# Patient Record
Sex: FEMALE | Race: WHITE | Employment: UNEMPLOYED | URBAN - METROPOLITAN AREA
[De-identification: names, ages, dates, MRNs, and addresses within clinical notes are randomized per-mention and may not be internally consistent; named-entity substitution may affect disease eponyms.]

---

## 2016-07-20 LAB — PAP SMEAR, EXTERNAL: NEGATIVE

## 2016-12-22 LAB
ANTIBODY SCREEN, EXTERNAL: NEGATIVE
CHLAMYDIA, EXTERNAL: NEGATIVE
CYSTIC FIBROSIS, EXTERNAL: NEGATIVE
HBSAG, EXTERNAL: NEGATIVE
HCT, EXTERNAL: 34.7
HGB, EXTERNAL: 11.7
HIV, EXTERNAL: NEGATIVE
N. GONORRHEA, EXTERNAL: NEGATIVE
PLATELET CNT,   EXTERNAL: 251
RUBELLA, EXTERNAL: NORMAL
T. PALLIDUM, EXTERNAL: NEGATIVE
TYPE, ABO & RH, EXTERNAL: NORMAL
URINALYSIS, EXTERNAL: NEGATIVE

## 2017-01-03 ENCOUNTER — TELEPHONE (OUTPATIENT)
Dept: OBGYN CLINIC | Age: 32
End: 2017-01-03

## 2017-01-03 ENCOUNTER — HOSPITAL ENCOUNTER (OUTPATIENT)
Dept: PERINATAL CARE | Age: 32
Discharge: HOME | End: 2017-01-03
Attending: OBSTETRICS & GYNECOLOGY
Payer: COMMERCIAL

## 2017-01-03 DIAGNOSIS — N92.6 MISSED MENSES: ICD-10-CM

## 2017-01-03 DIAGNOSIS — Z32.01 POSITIVE PREGNANCY TEST: Primary | ICD-10-CM

## 2017-01-03 PROCEDURE — 76813 OB US NUCHAL MEAS 1 GEST: CPT | Performed by: OBSTETRICS & GYNECOLOGY

## 2017-01-03 PROCEDURE — 36416 COLLJ CAPILLARY BLOOD SPEC: CPT | Performed by: OBSTETRICS & GYNECOLOGY

## 2017-01-03 NOTE — TELEPHONE ENCOUNTER
Called [facility] to see if pt was seen today. They confirmed she was seen. Referral generated and faxed to them.

## 2017-01-06 PROBLEM — Z34.90 PREGNANCY: Status: ACTIVE | Noted: 2017-01-06

## 2017-01-10 ENCOUNTER — TELEPHONE (OUTPATIENT)
Dept: PERINATAL CARE | Age: 32
End: 2017-01-10

## 2017-01-26 ENCOUNTER — ROUTINE PRENATAL (OUTPATIENT)
Dept: OBGYN CLINIC | Age: 32
End: 2017-01-26

## 2017-01-26 VITALS
WEIGHT: 102 LBS | HEIGHT: 63 IN | HEART RATE: 87 BPM | BODY MASS INDEX: 18.07 KG/M2 | SYSTOLIC BLOOD PRESSURE: 95 MMHG | DIASTOLIC BLOOD PRESSURE: 52 MMHG

## 2017-01-26 DIAGNOSIS — Z87.448 HX OF HEMATURIA: ICD-10-CM

## 2017-01-26 DIAGNOSIS — Z34.02 NORMAL FIRST PREGNANCY CONFIRMED, SECOND TRIMESTER: Primary | ICD-10-CM

## 2017-01-26 LAB
AFP, MATERNAL, EXTERNAL: NEGATIVE
URINALYSIS, EXTERNAL: NEGATIVE

## 2017-01-26 NOTE — PROGRESS NOTES
C/o HA - occipital, has had prior to preg, usually if she pushes herself too hard; takes Tylenol occ, but doesn't want to take meds. Rec tylenol prn, avoid NSAIDs, can try Mg, heat/ice, try to identify/minimize triggers. MSAFP today. Rpt UA for hematuria on NOB labs. RTO 4wks with US.

## 2017-01-26 NOTE — PATIENT INSTRUCTIONS

## 2017-01-28 LAB
AFP ADJ MOM SERPL: 0.74
AFP INTERP SERPL-IMP: NORMAL
AFP INTERP SERPL-IMP: NORMAL
AFP SERPL-MCNC: 33.4 NG/ML
AGE AT DELIVERY: 30.8 YEARS
COMMENT, 018013: NORMAL
GA METHOD: NORMAL
GA: 16.6 WEEKS
IDDM PATIENT QL: NO
Lab: NORMAL
MULTIPLE PREGNANCY: NO
NEURAL TUBE DEFECT RISK FETUS: NORMAL %
RESULTS, 017004: NORMAL

## 2017-01-29 LAB
APPEARANCE UR: ABNORMAL
BACTERIA #/AREA URNS HPF: ABNORMAL /[HPF]
BACTERIA UR CULT: NO GROWTH
BILIRUB UR QL STRIP: NEGATIVE
CASTS URNS QL MICRO: ABNORMAL /LPF
COLOR UR: YELLOW
CRYSTALS URNS MICRO: ABNORMAL
EPI CELLS #/AREA URNS HPF: ABNORMAL /HPF
GLUCOSE UR QL: NEGATIVE
HGB UR QL STRIP: NEGATIVE
KETONES UR QL STRIP: NEGATIVE
LEUKOCYTE ESTERASE UR QL STRIP: ABNORMAL
MICRO URNS: ABNORMAL
NITRITE UR QL STRIP: NEGATIVE
PH UR STRIP: 7.5 [PH] (ref 5–7.5)
PROT UR QL STRIP: ABNORMAL
RBC #/AREA URNS HPF: ABNORMAL /HPF
SP GR UR: 1.02 (ref 1–1.03)
UNIDENT CRYS URNS QL MICRO: PRESENT
URINALYSIS REFLEX, 377202: ABNORMAL
UROBILINOGEN UR STRIP-MCNC: 1 MG/DL (ref 0.2–1)
WBC #/AREA URNS HPF: ABNORMAL /HPF

## 2017-02-23 ENCOUNTER — ROUTINE PRENATAL (OUTPATIENT)
Dept: OBGYN CLINIC | Age: 32
End: 2017-02-23

## 2017-02-23 VITALS
BODY MASS INDEX: 18.78 KG/M2 | HEIGHT: 63 IN | DIASTOLIC BLOOD PRESSURE: 48 MMHG | HEART RATE: 75 BPM | WEIGHT: 106 LBS | SYSTOLIC BLOOD PRESSURE: 83 MMHG

## 2017-02-23 DIAGNOSIS — Z78.9 VEGETARIAN: ICD-10-CM

## 2017-02-23 DIAGNOSIS — Z34.02 NORMAL FIRST PREGNANCY CONFIRMED, SECOND TRIMESTER: Primary | ICD-10-CM

## 2017-02-23 NOTE — PATIENT INSTRUCTIONS

## 2017-02-23 NOTE — PROGRESS NOTES
US today wnl: 20+3 @ 20+6. Ant placenta. Nl morph. Does not want to know gender. Some fatigue, no dizziness. MSAFP wnl; UA/C&S wnl. Vegetarian - takes B12, would like nutrition referral.  RTO 4wks with 1hr/CBC. FETAL SURVEY  A SINGLE VIABLE IUP AT 20W6D GA BY LMP. FETAL CARDIAC MOTION OBSERVED. FETAL ANATOMY WELL VISUALIZED AND APPEARS WITHIN NORMAL LIMITS. NO ABNORMALITIES IDENTIFIED ON TODAYS EXAM.  APPROPRIATE GROWTH MEASURED; SIZE = DATES. HANNAH, CERVIX AND PLACENTA APPEAR WITHIN NORMAL LIMITS. GENDER: XY, PATIENT DOES NOT KNOW.

## 2017-03-23 ENCOUNTER — ROUTINE PRENATAL (OUTPATIENT)
Dept: OBGYN CLINIC | Age: 32
End: 2017-03-23

## 2017-03-23 VITALS — DIASTOLIC BLOOD PRESSURE: 52 MMHG | WEIGHT: 110 LBS | SYSTOLIC BLOOD PRESSURE: 100 MMHG | BODY MASS INDEX: 19.49 KG/M2

## 2017-03-23 DIAGNOSIS — Z34.00 PRENATAL CARE, FIRST PREGNANCY, UNSPECIFIED TRIMESTER: Primary | ICD-10-CM

## 2017-03-23 LAB
GTT, 1 HR, GLUCOLA, EXTERNAL: 83
HCT, EXTERNAL: 30.2
HGB, EXTERNAL: 10.4
PLATELET CNT,   EXTERNAL: 229

## 2017-03-23 NOTE — MR AVS SNAPSHOT
Visit Information Date & Time Provider Department Dept. Phone Encounter #  
 3/23/2017 10:30  S Romain Solomon, Bruce Moreau [phone] [id number] Upcoming Health Maintenance Date Due  
 PAP AKA CERVICAL CYTOLOGY 7/20/2019 Allergies as of 3/23/2017  Review Complete On: 3/23/2017 By: Melissa Ac Severity Noted Reaction Type Reactions Amoxicillin  03/24/2014   Systemic Rash Current Immunizations  Never Reviewed Name Date Influenza Vaccine (Quad) PF 12/22/2016 Not reviewed this visit You Were Diagnosed With   
  
 Codes Comments Prenatal care, first pregnancy, unspecified trimester    -  Primary ICD-10-CM: Z34.00 ICD-9-CM: V22.0 Vitals BP Weight(growth percentile) LMP BMI OB Status Smoking Status 100/52 110 lb (49.9 kg) 10/08/2016 (Approximate) 19.49 kg/m2 Pregnant Never Smoker BMI and BSA Data Body Mass Index Body Surface Area  
 19.49 kg/m 2 1.49 m 2 Your Updated Medication List  
  
   
This list is accurate as of: 3/23/17 11:11 AM.  Always use your most recent med list.  
  
  
  
  
 prenatal multivit-ca-min-fe-fa Tab Take  by mouth. We Performed the Following CBC W/O DIFF [57738 CPT(R)]   
 GEST. DIABETES 1-HR SCREEN [80952 CPT(R)] Introducing [facility]! Dear Jose Alberto Salguero: Thank you for requesting a [employer] account. Our records indicate that you already have an active [employer] account. You can access your account anytime at [email or web address] Did you know that you can access your hospital and ER discharge instructions at any time in [employer]? You can also review all of your test results from your hospital stay or ER visit. Additional Information If you have questions, please visit the Frequently Asked Questions section of the [employer] website at [email or web address] Remember, [employer] is NOT to be used for urgent needs. For medical emergencies, dial 911. Now available from your iPhone and Android! Please provide this summary of care documentation to your next provider. Your primary care clinician is listed as Roselyn Steward. If you have any questions after today's visit, please call [phone].

## 2017-03-23 NOTE — PROGRESS NOTES
+FM.  No VB/LOF/reg ctx. No c/o. 1hr/CBC today. Enc classes. Nutrition referral placed, but has not been notified about appt. Will check. RTO 4wks (DARNELL JOHNOSN).

## 2017-03-24 LAB
ERYTHROCYTE [DISTWIDTH] IN BLOOD BY AUTOMATED COUNT: 13.7 % (ref 12.3–15.4)
GLUCOSE 1H P 50 G GLC PO SERPL-MCNC: 83 MG/DL (ref 65–129)
HCT VFR BLD AUTO: 30.2 % (ref 34–46.6)
HGB BLD-MCNC: 10.4 G/DL (ref 11.1–15.9)
MCH RBC QN AUTO: 29.9 PG (ref 26.6–33)
MCHC RBC AUTO-ENTMCNC: 34.4 G/DL (ref 31.5–35.7)
MCV RBC AUTO: 87 FL (ref 79–97)
PLATELET # BLD AUTO: 229 X10E3/UL (ref 150–379)
RBC # BLD AUTO: 3.48 X10E6/UL (ref 3.77–5.28)
WBC # BLD AUTO: 4.5 X10E3/UL (ref 3.4–10.8)

## 2017-04-18 ENCOUNTER — ROUTINE PRENATAL (OUTPATIENT)
Dept: OBGYN CLINIC | Age: 32
End: 2017-04-18

## 2017-04-18 VITALS
SYSTOLIC BLOOD PRESSURE: 96 MMHG | DIASTOLIC BLOOD PRESSURE: 50 MMHG | WEIGHT: 113 LBS | HEART RATE: 74 BPM | HEIGHT: 63 IN | BODY MASS INDEX: 20.02 KG/M2

## 2017-04-18 DIAGNOSIS — Z34.03 NORMAL FIRST PREGNANCY CONFIRMED, THIRD TRIMESTER: Primary | ICD-10-CM

## 2017-04-18 NOTE — PROGRESS NOTES
C/o back pain -> rec mat belt, handout given for exercises. Mild anemia -> incr Fe. Vegetarian -> nutrition referral printed. Signed up for classes. Del consent reviewed. RTO 2wks.

## 2017-04-18 NOTE — PATIENT INSTRUCTIONS
Weeks 26 to 30 of Your Pregnancy: Care Instructions  Your Care Instructions    You are now in your last trimester of pregnancy. Your baby is growing rapidly. And you'll probably feel your baby moving around more often. Your doctor may ask you to count your baby's kicks. Your back may ache as your body gets used to your baby's size and length. If you haven't already had the Tdap shot during this pregnancy, talk to your doctor about getting it. It will help protect your  against pertussis infection. During this time, it's important to take care of yourself and pay attention to what your body needs. If you feel sexual, explore ways to be close with your partner that match your comfort and desire. Use the tips provided in this care sheet to find ways to be sexual in your own way. Follow-up care is a key part of your treatment and safety. Be sure to make and go to all appointments, and call your doctor if you are having problems. It's also a good idea to know your test results and keep a list of the medicines you take. How can you care for yourself at home? Take it easy at work  · Take frequent breaks. If possible, stop working when you are tired, and rest during your lunch hour. · Take bathroom breaks every 2 hours. · Change positions often. If you sit for long periods, stand up and walk around. · When you stand for a long time, keep one foot on a low stool with your knee bent. After standing a lot, sit with your feet up. · Avoid fumes, chemicals, and tobacco smoke. Be sexual in your own way  · Having sex during pregnancy is okay, unless your doctor tells you not to. · You may be very interested in sex, or you may have no interest at all. · Your growing belly can make it hard to find a good position during intercourse. [place] and explore. · You may get cramps in your uterus when your partner touches your breasts.   · A back rub may relieve the backache or cramps that sometimes follow orgasm. Learn about  labor  · Watch for signs of  labor. You may be going into labor if:  ¨ You have menstrual-like cramps, with or without nausea. ¨ You have about 4 or more contractions in 20 minutes, or about 8 or more within 1 hour, even after you have had a glass of water and are resting. ¨ You have a low, dull backache that does not go away when you change your position. ¨ You have pain or pressure in your pelvis that comes and goes in a pattern. ¨ You have intestinal cramping or flu-like symptoms, with or without diarrhea. ¨ You notice an increase or change in your vaginal discharge. Discharge may be heavy, mucus-like, watery, or streaked with blood. ¨ Your water breaks. · If you think you have  labor:  ¨ Drink 2 or 3 glasses of water or juice. Not drinking enough fluids can cause contractions. ¨ Stop what you are doing, and empty your bladder. Then lie down on your left side for at least 1 hour. ¨ While lying on your side, find your breast bone. Put your fingers in the soft spot just below it. Move your fingers down toward your belly button to find the top of your uterus. Check to see if it is tight. ¨ Contractions can be weak or strong. Record your contractions for an hour. Time a contraction from the start of one contraction to the start of the next one. ¨ Single or several strong contractions without a pattern are called Marco-Velasquez contractions. They are practice contractions but not the start of labor. They often stop if you change what you are doing. ¨ Call your doctor if you have regular contractions. Where can you learn more? Go to http://marlen-elly.info/. Enter L829 in the search box to learn more about \"Weeks 26 to 30 of Your Pregnancy: Care Instructions. \"  Current as of: May 30, 2016  Content Version: 11.2  © 1705-4385 [employer].  Care instructions adapted under license by [employer] (which disclaims liability or warranty for this information). If you have questions about a medical condition or this instruction, always ask your healthcare professional. [place] [address] any warranty or liability for your use of this information.

## 2017-05-04 ENCOUNTER — ROUTINE PRENATAL (OUTPATIENT)
Dept: OBGYN CLINIC | Age: 32
End: 2017-05-04

## 2017-05-04 VITALS
BODY MASS INDEX: 20.38 KG/M2 | DIASTOLIC BLOOD PRESSURE: 52 MMHG | HEART RATE: 68 BPM | HEIGHT: 63 IN | WEIGHT: 115 LBS | SYSTOLIC BLOOD PRESSURE: 81 MMHG

## 2017-05-04 DIAGNOSIS — Z34.03 NORMAL FIRST PREGNANCY CONFIRMED, THIRD TRIMESTER: Primary | ICD-10-CM

## 2017-05-04 NOTE — PATIENT INSTRUCTIONS
Weeks 30 to 32 of Your Pregnancy: Care Instructions  Your Care Instructions    You have made it to the final months of your pregnancy. By now, your baby is really starting to look like a baby, with hair and plump skin. As you enter the final weeks of pregnancy, the reality of having a baby may start to set in. This is the time to settle on a name, get your household in order, set up a safe nursery, and find quality  if needed. Doing these things in advance will allow you to focus on caring for and enjoying your new baby. You may also want to have a tour of your hospital's labor and delivery unit to get a better idea of what to expect while you are in the hospital.  During these last months, it is very important to take good care of yourself and pay attention to what your body needs. If your doctor says it is okay for you to work, don't push yourself too hard. Use the tips provided in this care sheet to ease heartburn and care for varicose veins. If you haven't already had the Tdap shot during this pregnancy, talk to your doctor about getting it. It will help protect your  against pertussis infection. Follow-up care is a key part of your treatment and safety. Be sure to make and go to all appointments, and call your doctor if you are having problems. It's also a good idea to know your test results and keep a list of the medicines you take. How can you care for yourself at home? Pay attention to your baby's movements  · You should feel your baby move several times every day. · Your baby now turns less, and kicks and jabs more. · Your baby sleeps 20 to 45 minutes at a time and is more active at certain times of day. · If your doctor wants you to count your baby's kicks:  ¨ Empty your bladder, and lie on your side or relax in a comfortable chair. ¨ Write down your start time. ¨ Pay attention only to your baby's movements. Count any movement except hiccups.   ¨ After you have counted 10 movements, write down your stop time. ¨ Write down how many minutes it took for your baby to move 10 times. ¨ If an hour goes by and you have not recorded 10 movements, have something to eat or drink and then count for another hour. If you do not record 10 movements in either hour, call your doctor. Ease heartburn  · Eat small, frequent meals. · Do not eat chocolate, peppermint, or very spicy foods. Avoid drinks with caffeine, such as coffee, tea, and sodas. · Avoid bending over or lying down after meals. · Talk a short walk after you eat. · If heartburn is a problem at night, do not eat for 2 hours before bedtime. · Take antacids like Mylanta, Maalox, Rolaids, or Tums. Do not take antacids that have sodium bicarbonate. Care for varicose veins  · Varicose veins are blood vessels that stretch out with the extra blood during pregnancy. Your legs may ache or throb. Most varicose veins will go away after the birth. · Avoid standing for long periods of time. Sit with your legs crossed at the ankles, not the knees. · Sit with your feet propped up. · Avoid tight clothing or stockings. Wear support hose. · Exercise regularly. Try walking for at least 30 minutes a day. Where can you learn more? Go to http://marlen-elly.info/. Enter N068 in the search box to learn more about \"Weeks 30 to 32 of Your Pregnancy: Care Instructions. \"  Current as of: May 30, 2016  Content Version: 11.2  © 0382-0551 [employer]. Care instructions adapted under license by [employer] (which disclaims liability or warranty for this information). If you have questions about a medical condition or this instruction, always ask your healthcare professional. [place] [address] any warranty or liability for your use of this information.

## 2017-05-04 NOTE — PROGRESS NOTES
+FM.  Back pain better. Was using mat belt. Now some rib pain, tolerable. Nutrition consult 5/11 (placed b/c pt stated vegetarian, but does eat some fish). RTO 2wk.

## 2017-05-11 ENCOUNTER — HOSPITAL ENCOUNTER (OUTPATIENT)
Dept: NUTRITION | Age: 32
Discharge: HOME | End: 2017-05-11
Attending: OBSTETRICS & GYNECOLOGY
Payer: COMMERCIAL

## 2017-05-11 DIAGNOSIS — Z34.90 PREGNANCY: ICD-10-CM

## 2017-05-11 PROCEDURE — 97802 MEDICAL NUTRITION INDIV IN: CPT | Performed by: DIETITIAN, REGISTERED

## 2017-05-11 NOTE — PROGRESS NOTES
[facility] Assessment - Initial Nutrition Evaluation   DATE: 2017      REFERRING PHYSICIAN: Dr. Sara Joe  NAME: Carlos East : 1985 AGE: 28 y.o. GENDER: female  REASON FOR VISIT: Pregnancy nutrition    Past Medical Hx: Cholecystectomy    LABS:   No results found for: HBA1C, HGBE8, URS6EAAV, QFI8VWWS    MEDICATIONS/SUPPLEMENTS:   [unfilled]  Prior to Admission medications    Medication Sig Start Date End Date Taking? Authorizing Provider   prenatal multivit-ca-min-fe-fa tab Take  by mouth. Historical Provider       FOOD ALLERGIES/INTOLERANCES: None    ANTHROPOMETRICS:    Ht Readings from Last 1 Encounters:   17 5' 3\" (1.6 m)      Wt Readings from Last 1 Encounters:   17 52.2 kg (115 lb)   UBW: 96# (pre-pregnancy) BMI: 17 (before pregnancy) Pt underweight  IBW: 115 # +/- 10%   %IBW: 100 % +/- 10% (~32 weeks pregnant)    BMI: 20.39 Category: Normal    Reported Wt Hx:  Pt reports UBW before pregnancy is 93-96 lbs (pt underweight). Pt has gained ~15 lbs during pregnancy-  Is ~32 weeks pregnant. Estimated Nutritional Needs:   9244-9020 kcal/day (EER (pre-pregnancy) x PA 1.12-1.27 + 452 third trimester); 60g protein daily (0.8g/kg prepregnancy wt + 25g)    Reported Diet Hx:  Pt is lacto-ovo vegetarian; also eats fish.   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 NUTRITION ASSESSMENT / INTERVENTION:  Pt has been lacto/ovo vegetarian/pescatarian for ~10 years; concerned over getting proper nutrients for pregnancy- pt is ~32 weeks pregnant; taking prenatal vitamins on daily basis. Pt has gained ~19 lbs (underweight pre-pregnancy); discussed recommended wt gain during pregnancy due to pt being underweight before pregnancy (28-40# recommended), along with increased calorie/protein needs. Pt currently eating 3 meals a day with snacks between, but current diet low in recommended protein/calories, likely due to less focus on nutrition as pt is very busy with work/school/long commute. Recommended pt consume at least 2300 kcal/day, 60g protein. Reviewed vegetarian sources of protein and provided pt with handout. Also discussed recommended amounts of iron, calcium, Vit D, folic acid and L14 along with food sources of each. Encouraged pt to consume foods high in Vitamin C to aide in iron absorption. Pt currently limiting caffeine; avoiding self-serve ice cream/frozen yogurt, and fish high in mercury. Recommended pt limit fish consumption to 8-12 oz per week. PATIENT GOALS:  1. Eat 3 meals a day with snacks between meals to include lean protein- aiming for 60g/day  2. Increase intake of dairy products to 3 servings per day  3. Plan meals/snacks when going to school to ensure getting required nutrients  4. Increase intake of overall calories to promote recommended wt gain  5. Include green leafy vegetables each day at meals  6. Continue to take prescribed prenatal vitamins      Specific tips and techniques to facilitate compliance with above recommendations were provided and discussed. Pt was strongly encourage to begin making necessary changes now, and re-visit the dietitian prn.   If further details are desired please feel free to contact me at [phone]. This phone number was also provided to the patient for any further questions or concerns.             Rudi Cole RD

## 2017-05-15 ENCOUNTER — TELEPHONE (OUTPATIENT)
Dept: OBGYN CLINIC | Age: 32
End: 2017-05-15

## 2017-05-15 NOTE — TELEPHONE ENCOUNTER
Please review routine travel precautions (included in handout she was given at NOB visit). Recommend remaining in local area after 36wks, so should be OK travel at this time. Individual airlines have their own policies regarding travel, so she should check with the airlines. Should get up and walk ~15min every couple of hours to minimize risk of DVT.

## 2017-05-15 NOTE — TELEPHONE ENCOUNTER
32w3d called wanting to know if she can travel by plane(2hours) or drive(14hours) to [facility]. Stated her  is graduating and would like to attend. Stated she is not having any problems. Please advise.

## 2017-05-16 ENCOUNTER — ROUTINE PRENATAL (OUTPATIENT)
Dept: OBGYN CLINIC | Age: 32
End: 2017-05-16

## 2017-05-16 VITALS
WEIGHT: 118 LBS | BODY MASS INDEX: 20.91 KG/M2 | DIASTOLIC BLOOD PRESSURE: 49 MMHG | HEIGHT: 63 IN | SYSTOLIC BLOOD PRESSURE: 89 MMHG | HEART RATE: 74 BPM

## 2017-05-16 DIAGNOSIS — Z34.03 NORMAL FIRST PREGNANCY CONFIRMED, THIRD TRIMESTER: Primary | ICD-10-CM

## 2017-05-16 NOTE — PATIENT INSTRUCTIONS

## 2017-05-16 NOTE — PROGRESS NOTES
Baby active. C/o reflux - OTC meds reviewed. Saw nutrition. Travel prec reviewed. Declines TDAP (had T=106 when she rec'd it 2012) -> will do PP.  RTO 2wks.

## 2017-06-02 ENCOUNTER — ROUTINE PRENATAL (OUTPATIENT)
Dept: OBGYN CLINIC | Age: 32
End: 2017-06-02

## 2017-06-02 VITALS
BODY MASS INDEX: 21.97 KG/M2 | SYSTOLIC BLOOD PRESSURE: 92 MMHG | DIASTOLIC BLOOD PRESSURE: 54 MMHG | HEIGHT: 63 IN | WEIGHT: 124 LBS

## 2017-06-02 DIAGNOSIS — Z34.03 NORMAL FIRST PREGNANCY CONFIRMED, THIRD TRIMESTER: Primary | ICD-10-CM

## 2017-06-02 LAB — GRBS, EXTERNAL: NEGATIVE

## 2017-06-02 NOTE — PROGRESS NOTES
+FM.  No VB/LOF/reg ctx. Bloody nose, \"pregnancy brain\". Cvx=1/lg/-2/post/med/ceph. GBS done. RTO 1wk with US for growth.

## 2017-06-02 NOTE — PATIENT INSTRUCTIONS
Group B Strep During Pregnancy: Care Instructions  Your Care Instructions  Group B strep infection is caused by a type of bacteria. It's a different kind of bacteria than the kind that causes strep throat. You may have this kind of bacteria in your body. Sometimes it may cause an infection, but most of the time it doesn't make you sick or cause symptoms. But if you pass the bacteria to your baby during the birth, it can cause serious health problems for your baby. If you have this bacteria in your body, you will get antibiotics when you are in labor. Antibiotics help prevent problems for a  baby. After birth, doctors will watch and may test your baby. If your baby tests positive for Group B strep, he or she will get antibiotics. If you plan to breastfeed your baby, don't worry. It will be safe to breastfeed. Follow-up care is a key part of your treatment and safety. Be sure to make and go to all appointments, and call your doctor if you are having problems. It's also a good idea to know your test results and keep a list of the medicines you take. How can you care for yourself at home? · If your doctor has prescribed antibiotics, take them as directed. Do not stop taking them just because you feel better. You need to take the full course of antibiotics. · Tell your doctor if you are allergic to any antibiotic. · If your water breaks, go to the hospital right away. Your doctor will give you antibiotics to help protect your baby from infection. · Tell the doctors and nurses at the hospital that you tested positive for group B strep. When should you call for help? Call your doctor now or seek immediate medical care if:  · You think your water has broken, even if you are not in labor. Watch closely for changes in your health, and be sure to contact your doctor if:  · You have burning when you urinate. · You have a fever. Where can you learn more?   Go to http://marlen-elly.info/. Enter M001 in the search box to learn more about \"Group B Strep During Pregnancy: Care Instructions. \"  Current as of: June 8, 2016  Content Version: 11.2  © 7760-7118 [employer]. Care instructions adapted under license by [employer] (which disclaims liability or warranty for this information). If you have questions about a medical condition or this instruction, always ask your healthcare professional. [address] any warranty or liability for your use of this information.

## 2017-06-08 ENCOUNTER — ROUTINE PRENATAL (OUTPATIENT)
Dept: OBGYN CLINIC | Age: 32
End: 2017-06-08

## 2017-06-08 VITALS — DIASTOLIC BLOOD PRESSURE: 54 MMHG | SYSTOLIC BLOOD PRESSURE: 98 MMHG | BODY MASS INDEX: 22.14 KG/M2 | WEIGHT: 125 LBS

## 2017-06-08 DIAGNOSIS — Z34.03 NORMAL FIRST PREGNANCY CONFIRMED, THIRD TRIMESTER: Primary | ICD-10-CM

## 2017-06-08 NOTE — PATIENT INSTRUCTIONS

## 2017-06-08 NOTE — PROGRESS NOTES
LIMITED OB SCAN  A SINGLE VERTEX 35W6D IUP IS SEEN. FETAL CARDIAC MOTION OBSERVED. LIMITED ANATOMY WAS VISUALIZED AND APPEARS WNL. APPROPRIATE FETAL GROWTH IS SEEN. SIZE = DATES. HANNAH AND PLACENTA APPEAR WNL. US today for S<D, wnl, AGA: 35+6 @ 35+6. 2764gm (46%). HANNAH=10. Vtx. No c/o GBS neg. RTO 1wk.

## 2017-06-16 ENCOUNTER — ROUTINE PRENATAL (OUTPATIENT)
Dept: OBGYN CLINIC | Age: 32
End: 2017-06-16

## 2017-06-16 VITALS
BODY MASS INDEX: 22.32 KG/M2 | SYSTOLIC BLOOD PRESSURE: 100 MMHG | DIASTOLIC BLOOD PRESSURE: 62 MMHG | WEIGHT: 126 LBS | HEIGHT: 63 IN

## 2017-06-16 DIAGNOSIS — Z34.03 NORMAL FIRST PREGNANCY CONFIRMED, THIRD TRIMESTER: Primary | ICD-10-CM

## 2017-06-16 NOTE — PATIENT INSTRUCTIONS
Week 37 of Your Pregnancy: Care Instructions  Your Care Instructions    You are near the end of your pregnancy--and you're probably pretty uncomfortable. It may be harder to walk around. Lying down probably isn't comfortable either. You may have trouble getting to sleep or staying asleep. Most women deliver their babies between 40 and 41 weeks. This is a good time to think about packing a bag for the hospital with items you'll need. Then you'll be ready when labor starts. Follow-up care is a key part of your treatment and safety. Be sure to make and go to all appointments, and call your doctor if you are having problems. It's also a good idea to know your test results and keep a list of the medicines you take. How can you care for yourself at home? Learn about breastfeeding  · Breastfeeding is best for your baby and good for you. · Breast milk has antibodies to help your baby fight infections. · Mothers who breastfeed often lose weight faster, because making milk burns calories. · Learning the best ways to hold your baby will make breastfeeding easier. · Let your partner bathe and diaper the baby to keep your partner from feeling left out. Snuggle together when you breastfeed. · You may want to learn how to use a breast pump and store your milk. · If you choose to bottle feed, make the feeding feel like breastfeeding so you can bond with your baby. Always hold your baby and the bottle. Do not prop bottles or let your baby fall asleep with a bottle. Learn about crying  · It is common for babies to cry for 1 to 3 hours a day. Some cry more, some cry less. · Babies don't cry to make you upset or because you are a bad parent. · Crying is how your baby communicates. Your baby may be hungry; have gas; need a diaper change; or feel cold, warm, tired, lonely, or tense. Sometimes babies cry for unknown reasons. · If you respond to your baby's needs, he or she will learn to trust you.   · Try to stay calm when your baby cries. Your baby may get more upset if he or she senses that you are upset. Know how to care for your   · Your baby's umbilical cord stump will drop off on its own, usually between 1 and 2 weeks. To care for your baby's umbilical cord area:  ¨ Clean the area at the bottom of the cord 2 or 3 times a day. ¨ Pay special attention to the area where the cord attaches to the skin. ¨ Keep the diaper folded below the cord. ¨ Use a damp washcloth or cotton ball to sponge bathe your baby until the stump has come off. · Your baby's first dark stool is called meconium. After the meconium is passed, your baby will develop his or her own bowel pattern. ¨ Some babies, especially  babies, have several bowel movements a day. Others have one or two a day, or one every 2 to 3 days. ¨  babies often have loose, yellow stools. Formula-fed babies have more formed stools. ¨ If your baby's stools look like little pellets, he or she is constipated. After 2 days of constipation, call your baby's doctor. · If your baby will be circumcised, you can care for him at home. ¨ Gently rinse his penis with warm water after every diaper change. Do not try to remove the film that forms on the penis. This film will go away on its own. Pat dry. ¨ Put petroleum ointment, such as Vaseline, on the area of the diaper that will touch your baby's penis. This will keep the diaper from sticking to your baby. ¨ Ask the doctor about giving your baby acetaminophen (Tylenol) for pain. Where can you learn more? Go to http://marlen-elly.info/. Enter 68 21 [id number] in the search box to learn more about \"Week 37 of Your Pregnancy: Care Instructions. \"  Current as of: May 30, 2016  Content Version: 11.2  © 0292-6708 [employer]. Care instructions adapted under license by [employer] (which disclaims liability or warranty for this information).  If you have questions about a medical condition or this instruction, always ask your healthcare professional. [place] [address] any warranty or liability for your use of this information.

## 2017-06-16 NOTE — PROGRESS NOTES
+FM.  No VB/LOF/reg ctx. No c/o. Cvx=2/30/-2-3/post/ceph/med. GBS neg. RTO 1 wk. Adv to remain in local area. Labor/ROM/FM prec.

## 2017-06-23 ENCOUNTER — ROUTINE PRENATAL (OUTPATIENT)
Dept: OBGYN CLINIC | Age: 32
End: 2017-06-23

## 2017-06-23 VITALS — BODY MASS INDEX: 22.85 KG/M2 | WEIGHT: 129 LBS | SYSTOLIC BLOOD PRESSURE: 100 MMHG | DIASTOLIC BLOOD PRESSURE: 58 MMHG

## 2017-06-23 DIAGNOSIS — Z34.03 NORMAL FIRST PREGNANCY CONFIRMED, THIRD TRIMESTER: Primary | ICD-10-CM

## 2017-06-23 NOTE — PROGRESS NOTES
+FM.  No VB/LOF/reg ctx. Some [address] cvx without significantly changed: 2/40/ceph/post/med/-2. She will let me know about IOL date around 41wks. RTO 1wk.

## 2017-06-23 NOTE — PATIENT INSTRUCTIONS
Week 38 of Your Pregnancy: Care Instructions  Your Care Instructions    Believe it or not, your baby is almost here. You may have ideas about your baby's personality because of how much he or she moves. Or you may have noticed how he or she responds to sounds, warmth, cold, and light. You may even know what kind of music your baby likes. By now, you have a better idea of what to expect during delivery. You may have talked about your birth preferences with your doctor. But even if you want a vaginal birth, it is a good idea to learn about  births.  birth means that your baby is born through a cut (incision) in your lower belly. It is sometimes the best choice for the health of the baby and the mother. This care sheet can help you understand  births. It also gives you information about what to expect after your baby is born. And it helps you understand more about postpartum depression. Follow-up care is a key part of your treatment and safety. Be sure to make and go to all appointments, and call your doctor if you are having problems. It's also a good idea to know your test results and keep a list of the medicines you take. How can you care for yourself at home? Learn about  birth  · Most C-sections are unplanned. They are done because of problems that occur during labor. These problems might include:  ¨ Labor that slows or stops. ¨ High blood pressure or other problems for the mother. ¨ Signs of distress in the baby. These signs may include a very fast or slow heart rate. · Although most mothers and babies do well after , it is major surgery. It has more risks than a vaginal delivery. · In some cases, a planned  may be safer than a vaginal delivery. This may be the case if:  ¨ The mother has a health problem, such as a heart condition. ¨ The baby isn't in a head-down position for delivery. This is called a breech position.   ¨ The uterus has scars from past surgeries. This could increase the chance of a tear in the uterus. ¨ There is a problem with the placenta. ¨ The mother has an infection, such as genital herpes, that could be spread to the baby. ¨ The mother is having twins or more. ¨ The baby weighs 9 to 10 pounds or more. · Because of the risks of , planned C-sections generally should be done only for medical reasons. And a planned  should be done at 39 weeks or later unless there is a medical reason to do it sooner. Know what to expect after delivery, and plan for the first few weeks at home  · You, your baby, and your partner or  will get identification bands. Only people with matching bands can  the baby from the nursery. · You will learn how to feed, diaper, and bathe your baby. And you will learn how to care for the umbilical cord stump. If your baby will be circumcised, you will also learn how to care for that. · Ask people to wait to visit you until you are at home. And ask them to wash their hands before they touch your baby. · Make sure you have another adult in your home for at least 2 or 3 days after the birth. · During the first 2 weeks, limit when friends and family can visit. · Do not allow visitors who have colds or infections. Make sure all visitors are up to date with their vaccinations. Never let anyone smoke around your baby. · Try to nap when the baby naps. Be aware of postpartum depression  · \"Baby blues\" are common for the first 1 to 2 weeks after birth. You may cry or feel sad or irritable for no reason. · For some women, these feelings last longer and are more intense. This is called postpartum depression. · If your symptoms last for more than a few weeks or you feel very depressed, ask your doctor for help. · Postpartum depression can be treated. Support groups and counseling can help. Sometimes medicine can also help. Where can you learn more?   Go to http://marlen-elly.info/. Enter B044 in the search box to learn more about \"Week 38 of Your Pregnancy: Care Instructions. \"  Current as of: March 16, 2017  Content Version: 11.3  © 9366-0029 [employer]. Care instructions adapted under license by [employer] (which disclaims liability or warranty for this information). If you have questions about a medical condition or this instruction, always ask your healthcare professional. [address] any warranty or liability for your use of this information.

## 2017-06-30 ENCOUNTER — ROUTINE PRENATAL (OUTPATIENT)
Dept: OBGYN CLINIC | Age: 32
End: 2017-06-30

## 2017-06-30 VITALS
DIASTOLIC BLOOD PRESSURE: 62 MMHG | SYSTOLIC BLOOD PRESSURE: 107 MMHG | BODY MASS INDEX: 23.21 KG/M2 | HEART RATE: 76 BPM | RESPIRATION RATE: 19 BRPM | HEIGHT: 63 IN | WEIGHT: 131 LBS

## 2017-06-30 DIAGNOSIS — Z34.03 NORMAL FIRST PREGNANCY CONFIRMED, THIRD TRIMESTER: Primary | ICD-10-CM

## 2017-07-05 ENCOUNTER — ANESTHESIA EVENT (OUTPATIENT)
Dept: LABOR AND DELIVERY | Age: 32
End: 2017-07-05
Payer: COMMERCIAL

## 2017-07-05 ENCOUNTER — HOSPITAL ENCOUNTER (INPATIENT)
Age: 32
LOS: 2 days | Discharge: HOME | End: 2017-07-07
Attending: OBSTETRICS & GYNECOLOGY | Admitting: OBSTETRICS & GYNECOLOGY
Payer: COMMERCIAL

## 2017-07-05 ENCOUNTER — TELEPHONE (OUTPATIENT)
Dept: OBGYN CLINIC | Age: 32
End: 2017-07-05

## 2017-07-05 ENCOUNTER — ANESTHESIA (OUTPATIENT)
Dept: LABOR AND DELIVERY | Age: 32
End: 2017-07-05
Payer: COMMERCIAL

## 2017-07-05 LAB
BASOPHILS # BLD AUTO: 0 K/UL (ref 0–0.1)
BASOPHILS # BLD: 0 % (ref 0–1)
EOSINOPHIL # BLD: 0 K/UL (ref 0–0.4)
EOSINOPHIL NFR BLD: 0 % (ref 0–7)
ERYTHROCYTE [DISTWIDTH] IN BLOOD BY AUTOMATED COUNT: 12.9 % (ref 11.5–14.5)
HCT VFR BLD AUTO: 36.5 % (ref 35–47)
HGB BLD-MCNC: 12.3 G/DL (ref 11.5–16)
LYMPHOCYTES # BLD AUTO: 27 % (ref 12–49)
LYMPHOCYTES # BLD: 2.9 K/UL (ref 0.8–3.5)
MCH RBC QN AUTO: 30.8 PG (ref 26–34)
MCHC RBC AUTO-ENTMCNC: 33.7 G/DL (ref 30–36.5)
MCV RBC AUTO: 91.5 FL (ref 80–99)
MONOCYTES # BLD: 0.5 K/UL (ref 0–1)
MONOCYTES NFR BLD AUTO: 5 % (ref 5–13)
NEUTS SEG # BLD: 7.3 K/UL (ref 1.8–8)
NEUTS SEG NFR BLD AUTO: 68 % (ref 32–75)
PLATELET # BLD AUTO: 217 K/UL (ref 150–400)
RBC # BLD AUTO: 3.99 M/UL (ref 3.8–5.2)
WBC # BLD AUTO: 10.7 K/UL (ref 3.6–11)

## 2017-07-05 PROCEDURE — 0DQR0ZZ REPAIR ANAL SPHINCTER, OPEN APPROACH: ICD-10-PCS | Performed by: OBSTETRICS & GYNECOLOGY

## 2017-07-05 PROCEDURE — 77030034850

## 2017-07-05 PROCEDURE — 74011250636 HC RX REV CODE- 250/636: Performed by: ANESTHESIOLOGY

## 2017-07-05 PROCEDURE — 3E0R3CZ INTRODUCE REGIONAL ANESTH IN SPINAL CANAL, PERC: ICD-10-PCS | Performed by: ANESTHESIOLOGY

## 2017-07-05 PROCEDURE — 65270000029 HC RM PRIVATE

## 2017-07-05 PROCEDURE — 85025 COMPLETE CBC W/AUTO DIFF WBC: CPT | Performed by: OBSTETRICS & GYNECOLOGY

## 2017-07-05 PROCEDURE — 36415 COLL VENOUS BLD VENIPUNCTURE: CPT | Performed by: OBSTETRICS & GYNECOLOGY

## 2017-07-05 PROCEDURE — 74011000250 HC RX REV CODE- 250

## 2017-07-05 PROCEDURE — 74011250636 HC RX REV CODE- 250/636: Performed by: OBSTETRICS & GYNECOLOGY

## 2017-07-05 PROCEDURE — 74011250637 HC RX REV CODE- 250/637: Performed by: OBSTETRICS & GYNECOLOGY

## 2017-07-05 PROCEDURE — 77030011943

## 2017-07-05 RX ORDER — DOCUSATE SODIUM 100 MG/1
100 CAPSULE, LIQUID FILLED ORAL 2 TIMES DAILY
Status: DISCONTINUED | OUTPATIENT
Start: 2017-07-06 | End: 2017-07-07 | Stop reason: HOSPADM

## 2017-07-05 RX ORDER — LIDOCAINE HYDROCHLORIDE 10 MG/ML
20 INJECTION INFILTRATION; PERINEURAL ONCE
Status: DISCONTINUED | OUTPATIENT
Start: 2017-07-05 | End: 2017-07-06 | Stop reason: HOSPADM

## 2017-07-05 RX ORDER — NALOXONE HYDROCHLORIDE 0.4 MG/ML
0.4 INJECTION, SOLUTION INTRAMUSCULAR; INTRAVENOUS; SUBCUTANEOUS AS NEEDED
Status: DISCONTINUED | OUTPATIENT
Start: 2017-07-05 | End: 2017-07-06 | Stop reason: HOSPADM

## 2017-07-05 RX ORDER — MINERAL OIL
120 OIL (ML) ORAL ONCE
Status: DISCONTINUED | OUTPATIENT
Start: 2017-07-05 | End: 2017-07-06 | Stop reason: HOSPADM

## 2017-07-05 RX ORDER — MAG HYDROX/ALUMINUM HYD/SIMETH 200-200-20
30 SUSPENSION, ORAL (FINAL DOSE FORM) ORAL
Status: DISCONTINUED | OUTPATIENT
Start: 2017-07-05 | End: 2017-07-06 | Stop reason: HOSPADM

## 2017-07-05 RX ORDER — ONDANSETRON 2 MG/ML
4 INJECTION INTRAMUSCULAR; INTRAVENOUS
Status: DISCONTINUED | OUTPATIENT
Start: 2017-07-05 | End: 2017-07-07 | Stop reason: HOSPADM

## 2017-07-05 RX ORDER — SODIUM CHLORIDE, SODIUM LACTATE, POTASSIUM CHLORIDE, CALCIUM CHLORIDE 600; 310; 30; 20 MG/100ML; MG/100ML; MG/100ML; MG/100ML
125 INJECTION, SOLUTION INTRAVENOUS CONTINUOUS
Status: DISCONTINUED | OUTPATIENT
Start: 2017-07-05 | End: 2017-07-07 | Stop reason: HOSPADM

## 2017-07-05 RX ORDER — ZOLPIDEM TARTRATE 5 MG/1
5 TABLET ORAL
Status: DISCONTINUED | OUTPATIENT
Start: 2017-07-05 | End: 2017-07-07 | Stop reason: HOSPADM

## 2017-07-05 RX ORDER — HYDROMORPHONE HYDROCHLORIDE 1 MG/ML
1 INJECTION, SOLUTION INTRAMUSCULAR; INTRAVENOUS; SUBCUTANEOUS
Status: DISCONTINUED | OUTPATIENT
Start: 2017-07-05 | End: 2017-07-07 | Stop reason: HOSPADM

## 2017-07-05 RX ORDER — DIPHENHYDRAMINE HCL 25 MG
25 CAPSULE ORAL
Status: DISCONTINUED | OUTPATIENT
Start: 2017-07-05 | End: 2017-07-07 | Stop reason: HOSPADM

## 2017-07-05 RX ORDER — ACETAMINOPHEN 325 MG/1
650 TABLET ORAL
Status: DISCONTINUED | OUTPATIENT
Start: 2017-07-05 | End: 2017-07-06 | Stop reason: HOSPADM

## 2017-07-05 RX ORDER — SIMETHICONE 80 MG
80 TABLET,CHEWABLE ORAL
Status: DISCONTINUED | OUTPATIENT
Start: 2017-07-05 | End: 2017-07-07 | Stop reason: HOSPADM

## 2017-07-05 RX ORDER — ACETAMINOPHEN 325 MG/1
650 TABLET ORAL
Status: DISCONTINUED | OUTPATIENT
Start: 2017-07-05 | End: 2017-07-07 | Stop reason: HOSPADM

## 2017-07-05 RX ORDER — HYDROCORTISONE ACETATE PRAMOXINE HCL 2.5; 1 G/100G; G/100G
CREAM TOPICAL AS NEEDED
Status: DISCONTINUED | OUTPATIENT
Start: 2017-07-05 | End: 2017-07-06

## 2017-07-05 RX ORDER — FENTANYL/BUPIVACAINE/NS/PF 2-1250MCG
1-16 PREFILLED PUMP RESERVOIR EPIDURAL CONTINUOUS
Status: DISCONTINUED | OUTPATIENT
Start: 2017-07-05 | End: 2017-07-06 | Stop reason: HOSPADM

## 2017-07-05 RX ORDER — NALOXONE HYDROCHLORIDE 0.4 MG/ML
0.4 INJECTION, SOLUTION INTRAMUSCULAR; INTRAVENOUS; SUBCUTANEOUS ONCE
Status: DISCONTINUED | OUTPATIENT
Start: 2017-07-05 | End: 2017-07-06 | Stop reason: HOSPADM

## 2017-07-05 RX ORDER — ZOLPIDEM TARTRATE 5 MG/1
10 TABLET ORAL
Status: DISCONTINUED | OUTPATIENT
Start: 2017-07-05 | End: 2017-07-05

## 2017-07-05 RX ORDER — IBUPROFEN 800 MG/1
800 TABLET ORAL EVERY 8 HOURS
Status: DISCONTINUED | OUTPATIENT
Start: 2017-07-05 | End: 2017-07-07 | Stop reason: HOSPADM

## 2017-07-05 RX ORDER — BUPIVACAINE HYDROCHLORIDE 2.5 MG/ML
INJECTION, SOLUTION EPIDURAL; INFILTRATION; INTRACAUDAL AS NEEDED
Status: DISCONTINUED | OUTPATIENT
Start: 2017-07-05 | End: 2017-07-05 | Stop reason: HOSPADM

## 2017-07-05 RX ORDER — OXYTOCIN/RINGER'S LACTATE 20/1000 ML
125-500 PLASTIC BAG, INJECTION (ML) INTRAVENOUS ONCE
Status: ACTIVE | OUTPATIENT
Start: 2017-07-05 | End: 2017-07-06

## 2017-07-05 RX ORDER — OXYCODONE AND ACETAMINOPHEN 5; 325 MG/1; MG/1
1 TABLET ORAL
Status: DISCONTINUED | OUTPATIENT
Start: 2017-07-05 | End: 2017-07-07 | Stop reason: HOSPADM

## 2017-07-05 RX ORDER — ONDANSETRON 2 MG/ML
4 INJECTION INTRAMUSCULAR; INTRAVENOUS
Status: DISCONTINUED | OUTPATIENT
Start: 2017-07-05 | End: 2017-07-06 | Stop reason: HOSPADM

## 2017-07-05 RX ORDER — NALOXONE HYDROCHLORIDE 0.4 MG/ML
0.4 INJECTION, SOLUTION INTRAMUSCULAR; INTRAVENOUS; SUBCUTANEOUS AS NEEDED
Status: DISCONTINUED | OUTPATIENT
Start: 2017-07-05 | End: 2017-07-07 | Stop reason: HOSPADM

## 2017-07-05 RX ORDER — LOPERAMIDE HYDROCHLORIDE 2 MG/1
2 CAPSULE ORAL
Status: DISCONTINUED | OUTPATIENT
Start: 2017-07-05 | End: 2017-07-07 | Stop reason: HOSPADM

## 2017-07-05 RX ORDER — OXYTOCIN IN 5 % DEXTROSE 30/500 ML
500 PLASTIC BAG, INJECTION (ML) INTRAVENOUS ONCE
Status: COMPLETED | OUTPATIENT
Start: 2017-07-05 | End: 2017-07-05

## 2017-07-05 RX ADMIN — SODIUM CHLORIDE, SODIUM LACTATE, POTASSIUM CHLORIDE, AND CALCIUM CHLORIDE 1000 ML: 600; 310; 30; 20 INJECTION, SOLUTION INTRAVENOUS at 16:00

## 2017-07-05 RX ADMIN — SODIUM CHLORIDE, SODIUM LACTATE, POTASSIUM CHLORIDE, AND CALCIUM CHLORIDE 1000 ML: 600; 310; 30; 20 INJECTION, SOLUTION INTRAVENOUS at 16:10

## 2017-07-05 RX ADMIN — IBUPROFEN 800 MG: 800 TABLET, FILM COATED ORAL at 22:26

## 2017-07-05 RX ADMIN — SODIUM CHLORIDE, SODIUM LACTATE, POTASSIUM CHLORIDE, AND CALCIUM CHLORIDE 125 ML: 600; 310; 30; 20 INJECTION, SOLUTION INTRAVENOUS at 19:07

## 2017-07-05 RX ADMIN — FENTANYL 0.2 MG/100ML-BUPIV 0.125%-NACL 0.9% EPIDURAL INJ 12 ML/HR: 2/0.125 SOLUTION at 17:02

## 2017-07-05 RX ADMIN — Medication 999 ML/HR: at 20:59

## 2017-07-05 RX ADMIN — BUPIVACAINE HYDROCHLORIDE 10 ML: 2.5 INJECTION, SOLUTION EPIDURAL; INFILTRATION; INTRACAUDAL at 16:53

## 2017-07-05 NOTE — H&P
History & Physical    Name: Francisco Dutton MRN: [id number]  SSN: [id number]    YOB: 1985  Age: 28 y.o. Sex: female        Subjective:     Estimated Date of Delivery: 17  OB History    Para Term  AB Living   1        SAB TAB Ectopic Molar Multiple Live Births              # Outcome Date GA Lbr Efraín/2nd Weight Sex Delivery Anes PTL Lv   1 Current                   Ms. Nataliia Cabrera is admitted with pregnancy at 39w5d for active labor. Prenatal course was normal. Please see prenatal records for details. Past Medical History:   Diagnosis Date    Anemia     Routine Papanicolaou smear 2016    Negative (no hpv) from [facility]     Past Surgical History:   Procedure Laterality Date    HX CHOLECYSTECTOMY  2010    HX WISDOM TEETH EXTRACTION       Social History     Occupational History    Not on file. Social History Main Topics    Smoking status: Never Smoker    Smokeless tobacco: Never Used      Comment: Never used vapor or e-cigs     Alcohol use No    Drug use: No    Sexual activity: Yes     Partners: Male     Birth control/ protection: None     Family History   Problem Relation Age of Onset    Thyroid Disease Mother     Cancer Father      Prostate     Thyroid Disease Maternal Grandmother     Thyroid Disease Other      6 Siblings of Mother h/o Hyperthyroidism        Allergies   Allergen Reactions    Amoxicillin Rash     Prior to Admission medications    Medication Sig Start Date End Date Taking? Authorizing Provider   prenatal multivit-ca-min-fe-fa tab Take  by mouth.    Yes Historical Provider   AMBULATORY BREAST PUMP Double electric breast pump, dual setup  Z39.1  CHESTER=17   Charbel Conti MD        Review of Systems: Constitutional: negative  Respiratory: negative  Cardiovascular: negative  Gastrointestinal: negative  Genitourinary:negative  Hematologic/lymphatic: negative  Musculoskeletal:negative  Neurological: negative  Behavioral/Psych: negative    Objective:     Vitals:  Vitals:    07/05/17 1602 07/05/17 1605 07/05/17 1608 07/05/17 1613   BP:  115/72     Pulse:  69     Resp:  20     Temp:  97.9 °F (36.6 °C)     SpO2:  98% 99% 100%   Weight: 59.4 kg (131 lb)      Height: 5' 3\" (1.6 m)           Physical Exam:  Patient without distress. Heart: Regular rate and rhythm  Lung: clear to auscultation throughout lung fields and normal respiratory effort  Abdomen: soft, nontender  Perineum: blood absent, amniotic fluid absent  Cervical Exam: 7-8/90/-1  Membranes:  Intact  Fetal Heart Rate: cat I tracing without decels ctx q 3 minutes    Prenatal Labs:   Lab Results   Component Value Date/Time    Rubella, External Immune 12/22/2016    GrBStrep, External Negative  06/02/2017    HBsAg, External Negative  12/22/2016    HIV, External Negative  12/22/2016    Gonorrhea, External Negative  12/22/2016    Chlamydia, External Negative  12/22/2016    ABO,Rh O Positive  12/22/2016         Assessment/Plan:     Active Problems:    * No active hospital problems. *       Plan: Admit for Reassuring fetal status. Group B Strep was negative.     Signed By:  Randi Roy MD     July 5, 2017

## 2017-07-05 NOTE — TELEPHONE ENCOUNTER
Sounds like early labor/mucous plug. If has gush, persistent watery discharge, or active bleeding, then will need to be seen. Agree with labor/ROM/FM prec.

## 2017-07-05 NOTE — TELEPHONE ENCOUNTER
This nurse called the patient back . Patient states her pad is dry. This nurse advised patient of MD recommendations and will monitor her contractions, any gush of fluid, fetal movement and vaginal bleeding. Patient verbalized understanding.

## 2017-07-05 NOTE — TELEPHONE ENCOUNTER
Call received at 2:00pm      Patient calling back to say that her contractions are now every 3-5 minutes for the past hour. Patient additionally reports passing some bloody mucus. Patient advised per work in Dr. Yung JOSHUA to come to the office now. Patient placed on the scheduled to be seen in the office at 2:50pm. Patient verbalized understanding.

## 2017-07-05 NOTE — IP AVS SNAPSHOT
Current Discharge Medication List  
  
START taking these medications Dose & Instructions Dispensing Information Comments Morning Noon Evening Bedtime  
 oxyCODONE-acetaminophen 5-325 mg per tablet Commonly known as:  PERCOCET Your last dose was: Your next dose is:    
   
   
 Dose:  1-2 Tab Take 1-2 Tabs by mouth every four (4) hours as needed for Pain. Max Daily Amount: 12 Tabs. Quantity:  10 Tab Refills:  0 CONTINUE these medications which have NOT CHANGED Dose & Instructions Dispensing Information Comments Morning Noon Evening Bedtime AMBULATORY BREAST PUMP Your last dose was: Your next dose is:    
   
   
 Double electric breast pump, dual setup Z39.1 CHESTER=7/7/17 Quantity:  1 Units Refills:  0  
     
   
   
   
  
 prenatal multivit-ca-min-fe-fa Tab Your last dose was: Your next dose is: Take  by mouth. Refills:  0 Where to Get Your Medications Information on where to get these meds will be given to you by the nurse or doctor. ! Ask your nurse or doctor about these medications  
  oxyCODONE-acetaminophen 5-325 mg per tablet

## 2017-07-05 NOTE — PROGRESS NOTES
[address] 07/07/17 admitted in labor from the MD office. Pt allergic to Amoxicillin and Tdap vaccine. Pt denies any problems with her pregnancy except anemia  1600  IV LR bolus started. Pt requesting epidural for pain. Pt does very well breathing with her contractions. Portia Lay 4:20 PM Dr Norbert Mcintosh at the bedside to introduce herself to the pt.  1630 Dr Daxa Gillespie at the bedside to go over epidural  1634 Time out complete  4:42 PM Procedure started. 1655  Epidural complete pt resting on her right side. States she feels 100% better  1705  Pt cont's to be very comfortable  1710  Pt st cath for 100cc of clear yellow urine.   Pt resting on her right side  1750  Pt resting comfortably  6:07 PM Pt repositioned to her right side  1850  SVE C/C/+1 Dr Stan Tavarez aware will labor down until 2000 or pt feels pressure

## 2017-07-05 NOTE — IP AVS SNAPSHOT
[address] 
[phone] Patient: Yolis Gurrola MRN: [id number] PATY:3/63/3844 You are allergic to the following Allergen Reactions Amoxicillin Rash Recent Documentation Height Weight Breastfeeding? BMI OB Status Smoking Status 1.6 m 59.4 kg Unknown 23.21 kg/m2 Recent pregnancy Never Smoker Unresulted Labs Order Current Status SAMPLE TO BLOOD BANK In process Emergency Contacts Name Discharge Info Relation Home Work Mobile PiyushsáncheztoñoYuns DISCHARGE CAREGIVER [3] Spouse [3]   [phone] [address]  Parent [1] [phone] About your hospitalization You were admitted on:  July 5, 2017 You last received care in the:  [facility] You were discharged on:  July 7, 2017 Unit phone number:  [phone] Why you were hospitalized Your primary diagnosis was:  Not on File Your diagnoses also included:  Labor Abnormal  
  
  
 
  
  
Providers Seen During Your Hospitalizations Provider Role Specialty Primary office phone Linda Chen MD Attending Provider Obstetrics & Gynecology [phone] Cecy Burgess MD Attending Provider Obstetrics & Gynecology [phone] Your Primary Care Physician (PCP) Primary Care Physician Office Phone Office Fax Becky, [facility] [phone] Follow-up Information Follow up With Details Comments Contact Info Cecy Burgess MD   [address] [place] [address] 
[phone] Your Appointments Friday July 07, 2017 11:00 AM EDT  
[facility] with [facility] Bourne [facility] [address] [place] [address] [address] [place] [address]  
[phone] Friday July 14, 2017  7:00 AM EDT PROCEDURE with MD Negro Bourne ([facility]) [address] [place] [address] [address] [facility]  
[phone] Current Discharge Medication List  
  
START taking these medications Dose & Instructions Dispensing Information Comments Morning Noon Evening Bedtime  
 oxyCODONE-acetaminophen 5-325 mg per tablet Commonly known as:  PERCOCET Your last dose was: Your next dose is:    
   
   
 Dose:  1-2 Tab Take 1-2 Tabs by mouth every four (4) hours as needed for Pain. Max Daily Amount: 12 Tabs. Quantity:  10 Tab Refills:  0 CONTINUE these medications which have NOT CHANGED Dose & Instructions Dispensing Information Comments Morning Noon Evening Bedtime AMBULATORY BREAST PUMP Your last dose was: Your next dose is:    
   
   
 Double electric breast pump, dual setup Z39.1 CHESTER=17 Quantity:  1 Units Refills:  0  
     
   
   
   
  
 prenatal multivit-ca-min-fe-fa Tab Your last dose was: Your next dose is: Take  by mouth. Refills:  0 Where to Get Your Medications Information on where to get these meds will be given to you by the nurse or doctor. ! Ask your nurse or doctor about these medications  
  oxyCODONE-acetaminophen 5-325 mg per tablet Discharge Instructions POST DELIVERY DISCHARGE INSTRUCTIONS Name: Nga Arguelles YOB: 1985 General:  
 
Diet/Diet Restrictions: 
Eight 8-ounce glasses of fluid daily (primarily water); avoid excessive caffeine intake. Meals/snacks as desired which are high in fiber and complex carbohydrates and low in fat and cholesterol. Physical Activity / Restrictions / Safety:  
 
Avoid heavy lifting, no more that 15 lbs. For 2-3 weeks; No driving while taking narcotic pain medication. Post  patients should not drive until pain free.  
No intercourse until seen for your 6 week postpartum visit, no douching or tampon use. No swimming or tub baths for 6 weeks. May resume exercise in 4-6 weeks. Discharge Instructions/Special Treatment/Home Care Needs:  
 
Continue prenatal vitamins. Continue to use squirt bottle with warm water on your episiotomy after each bathroom use until bleeding stops. If steri-strips applied to your incision, remove in 14 days. Take stool softeners daily. Call your doctor for the following:  
 
Fever over 101 degrees by mouth. Vaginal bleeding heavier than a normal menstrual period or lost larger than a golf ball. Red streaks or increased swelling of legs, painful red streaks on your breast. 
Painful urination, or increased pain, redness or discharge with your incision. Pain Management:  
 
Pain Management:  
Take Acetaminophen (Tylenol) or Ibuprofen (Advil, Motrin), as directed for pain. Use a warm Sitz bath 3 times daily to relieve episiotomy or hemorrhoidal discomfort. Heating pad to  incision as needed. For hemorrhoidal discomfort, use Tucks and Anusol cream as needed and directed. Follow-Up Care:  
 
Appointment with MD: Follow-up Appointments Procedures  FOLLOW UP VISIT Appointment in: 6 Weeks Standing Status:   Standing Number of Occurrences:   1 Order Specific Question:   Appointment in Answer:   6 Weeks Telephone number: [phone] Signed By: Rachael Garcia MD                                                                                                   Date: 2017 Time: 7:34 PM 
 
 
 
Discharge Orders None [employer] Announcement We are excited to announce that we are making your provider's discharge notes available to you in "[employer]. You will see these notes when they are completed and signed by the physician that discharged you from your recent hospital stay.   If you have any questions or concerns about any information you see in [employer], please call the [employer] [facility] where you were seen or reach out to your Primary Care Provider for more information about your plan of care. Introducing [facility]! Dear Evie Vicente: Thank you for requesting a [employer] account. Our records indicate that you already have an active [employer] account. You can access your account anytime at [email or web address] Did you know that you can access your hospital and ER discharge instructions at any time in [employer]? You can also review all of your test results from your hospital stay or ER visit. Additional Information If you have questions, please visit the Frequently Asked Questions section of the [employer] website at [email or web address] Remember, [employer] is NOT to be used for urgent needs. For medical emergencies, dial 911. Now available from your iPhone and Android! General Information Please provide this summary of care documentation to your next provider. Patient Signature:  ____________________________________________________________ Date:  ____________________________________________________________  
  
Felecia Ku Provider Signature:  ____________________________________________________________ Date:  ____________________________________________________________

## 2017-07-05 NOTE — TELEPHONE ENCOUNTER
Call received at 8:59am        28year old  39w5d pregnant patient last seen in the office on 17. Patient calling to say that at at 5:30am and another time that she had a lot of very thin mucus vaginal discharge and the one time a light pink streak note. Patient reports contractions every 15 minutes lasting less than a minute. Patient reports positive fetal movement. This nurse advised patient to put on a pad and to call back in one hour. Patient advised to continue to monitor contractions , fetal movement and for bright red bleeding. Additional recommendations Please advise            Patient is 45 minutes from the office.

## 2017-07-05 NOTE — ANESTHESIA PROCEDURE NOTES
Epidural Block    Start time: 7/5/2017 4:45 PM  End time: 7/5/2017 4:53 PM  Performed by: Pauline Mccabe  Authorized by: Pauline Mccabe     Pre-Procedure  Indication: labor epidural    Preanesthetic Checklist: patient identified, risks and benefits discussed, anesthesia consent, timeout performed and anesthesia consent      Epidural:   Patient position:  Seated  Prep region:  Lumbar  Prep: Betadine    Location:  L3-4    Needle and Epidural Catheter:   Needle Type:  Tuohy  Needle Gauge:  17 G  Injection Technique:  Loss of resistance using air  Attempts:  1  Catheter Size:  18 G  Events: no paresthesia and negative aspiration test    Test Dose:  Lidocaine 1.5% w/ epi and negative    Assessment:   Catheter Secured:  Tegaderm and tape  Insertion:  Uncomplicated  Patient tolerance:  Patient tolerated the procedure well with no immediate complications

## 2017-07-06 PROCEDURE — 65270000029 HC RM PRIVATE

## 2017-07-06 PROCEDURE — 76060000078 HC EPIDURAL ANESTHESIA: Performed by: ANESTHESIOLOGY

## 2017-07-06 PROCEDURE — 75410000000 HC DELIVERY VAGINAL/SINGLE: Performed by: OBSTETRICS & GYNECOLOGY

## 2017-07-06 PROCEDURE — 75410000002 HC LABOR FEE PER 1 HR: Performed by: OBSTETRICS & GYNECOLOGY

## 2017-07-06 PROCEDURE — 75410000003 HC RECOV DEL/VAG/CSECN EA 0.5 HR: Performed by: OBSTETRICS & GYNECOLOGY

## 2017-07-06 PROCEDURE — 74011250637 HC RX REV CODE- 250/637: Performed by: OBSTETRICS & GYNECOLOGY

## 2017-07-06 PROCEDURE — 77030021125

## 2017-07-06 PROCEDURE — 77010026065 HC OXYGEN MINIMUM MEDICAL AIR: Performed by: OBSTETRICS & GYNECOLOGY

## 2017-07-06 RX ORDER — HYDROCORTISONE ACETATE PRAMOXINE HCL 2.5; 1 G/100G; G/100G
CREAM TOPICAL AS NEEDED
Status: DISCONTINUED | OUTPATIENT
Start: 2017-07-06 | End: 2017-07-07 | Stop reason: HOSPADM

## 2017-07-06 RX ORDER — OXYCODONE AND ACETAMINOPHEN 10; 325 MG/1; MG/1
1 TABLET ORAL
Status: DISCONTINUED | OUTPATIENT
Start: 2017-07-06 | End: 2017-07-07 | Stop reason: HOSPADM

## 2017-07-06 RX ORDER — OXYCODONE AND ACETAMINOPHEN 5; 325 MG/1; MG/1
1-2 TABLET ORAL
Qty: 10 TAB | Refills: 0 | Status: SHIPPED | OUTPATIENT
Start: 2017-07-06 | End: 2018-02-22

## 2017-07-06 RX ADMIN — IBUPROFEN 800 MG: 800 TABLET, FILM COATED ORAL at 21:44

## 2017-07-06 RX ADMIN — OXYCODONE HYDROCHLORIDE AND ACETAMINOPHEN 1 TABLET: 5; 325 TABLET ORAL at 21:44

## 2017-07-06 RX ADMIN — OXYCODONE HYDROCHLORIDE AND ACETAMINOPHEN 1 TABLET: 5; 325 TABLET ORAL at 05:52

## 2017-07-06 RX ADMIN — OXYCODONE HYDROCHLORIDE AND ACETAMINOPHEN 1 TABLET: 5; 325 TABLET ORAL at 22:34

## 2017-07-06 RX ADMIN — IBUPROFEN 800 MG: 800 TABLET, FILM COATED ORAL at 05:45

## 2017-07-06 RX ADMIN — OXYCODONE HYDROCHLORIDE AND ACETAMINOPHEN 1 TABLET: 5; 325 TABLET ORAL at 09:46

## 2017-07-06 RX ADMIN — OXYCODONE HYDROCHLORIDE AND ACETAMINOPHEN 1 TABLET: 5; 325 TABLET ORAL at 13:59

## 2017-07-06 RX ADMIN — DOCUSATE SODIUM 100 MG: 100 CAPSULE ORAL at 21:43

## 2017-07-06 RX ADMIN — IBUPROFEN 800 MG: 800 TABLET, FILM COATED ORAL at 13:59

## 2017-07-06 RX ADMIN — DOCUSATE SODIUM 100 MG: 100 CAPSULE ORAL at 09:06

## 2017-07-06 RX ADMIN — MUPIROCIN: 20 OINTMENT TOPICAL at 09:06

## 2017-07-06 NOTE — ROUTINE PROCESS
TRANSFER - OUT REPORT:    Verbal report given to Manuel Berg RN(name) on Peri Mccann  being transferred to MIU(unit) for routine progression of care       Report consisted of patients Situation, Background, Assessment and   Recommendations(SBAR). Information from the following report(s) SBAR, Kardex, Intake/Output, MAR and Recent Results was reviewed with the receiving nurse. Lines:   Peripheral IV 07/05/17 Left Forearm (Active)   Site Assessment Clean, dry, & intact 7/5/2017 11:30 PM   Phlebitis Assessment 0 7/5/2017 11:30 PM   Infiltration Assessment 0 7/5/2017 11:30 PM   Dressing Status Clean, dry, & intact 7/5/2017 11:30 PM   Dressing Type Tape;Transparent 7/5/2017 11:30 PM   Hub Color/Line Status Pink;Capped; Patent 7/5/2017 11:30 PM   Alcohol Cap Used Yes 7/5/2017 11:30 PM        Opportunity for questions and clarification was provided.       Patient transported with:   Registered Nurse

## 2017-07-06 NOTE — PROGRESS NOTES
Post-Partum Day Number 1 Progress Note      Patient doing well post-partum without significant complaint. She is voiding without difficulty, she reports normal lochia. She is ambulatiing without dizziness. Her pain is well controlled with oral pain medication. She is tolerating general diet. Breast feeding. Baby not nursing as well today. Will want circ, but lactation suggested that she may want to wait. Vitals:  Patient Vitals for the past 8 hrs:   BP Temp Pulse Resp   17 1546 97/58 98.3 °F (36.8 °C) 65 16     Temp (24hrs), Av.3 °F (36.8 °C), Min:97.8 °F (36.6 °C), Max:98.6 °F (37 °C)        Exam:  Patient without distress. Lungs: CTA bilaterally               CV: regular rate/rhythm, no rubs or gallops, no murmur               Abdomen soft, nontender, nondistended, normal bowel sounds               Uterus: fundus firm at level of umbilicus, nontender               Lower extremities are negative for cords or tenderness, no swelling. Labs: No results found for this or any previous visit (from the past 24 hour(s)). Assessment and Plan:   Postpartum Day #1 S/P . Doing well.    - routine care   - anticipate discharge in AM

## 2017-07-06 NOTE — PROGRESS NOTES
Bedside shift change report given to Teresa Alcaraz RN (oncoming nurse) by Aarti Cooper RN (offgoing nurse). Report included the following information SBAR, Kardex, Intake/Output and MAR.

## 2017-07-06 NOTE — PROGRESS NOTES
1905: Verbal bedside SBAR report received from [address]: Dr. Renuka Rosa at bedside, MD reviews FHR and ctx tracing, updated on SVE. MD order to begin pushing at this time. 2100: Dr. Renuka Rosa verbal order for dao placement due to pt's third degree laceration. MD to place dao after repair. MD order to leave dao in until 1200 tomorrow.

## 2017-07-06 NOTE — PROGRESS NOTES
Henry catheter removed. Patient ambulated to bathroom with minimal assistance by RN. Patient able to void 50 mls. Pericare performed on and taught to patient.

## 2017-07-06 NOTE — L&D DELIVERY NOTE
Delivery Summary    Patient: Shreya Zhu MRN: [id number]  SSN: [id number]    YOB: 1985  Age: 28 y.o. Sex: female        Labor Events:    Labor: No    Rupture Date: 2017    Rupture Time: 4:15 PM    Rupture Type SROM    Amniotic Fluid Volume:      Amniotic Fluid Description: Clear       Induction: None        Augmentation: None    Labor Complications: Additional Complications:        Cervical Ripenin2017  4:25 PM  None      Delivery Events:  Episiotomy: None    Laceration(s): Third degree perineal      Repaired:       Number of Repair Packets: 4    Suture Type and Size:         Estimated Blood Loss (ml):   400 ml       Information for the patient's newbornGorge Lockhart Male [[id number]]     Delivery Summary - Baby    Delivery Date: 2017   Delivery Time: 8:52 PM   Delivery Type: Vaginal, Spontaneous Delivery  Sex:  male  Gestational Age: 38w11d  Delivery Clinician:  Charity Worrell  Living?: Living   Delivery Location: L&D 205           APGARS  One minute Five minutes Ten minutes   Skin Color: 1    1       Heart Rate: 2   2         Reflex Irritability: 2   2         Muscle Tone: 2   2       Respiration: 2   2         Total: 9   9           Presentation: Compound  Position:        Resuscitation Method:  Suctioning-bulb; Tactile Stimulation     Meconium Stained: None    Cord Information: 3 Vessels   Complications: None  Cord Blood Sent?:  Yes    Blood Gases Sent?:  No    Placenta:  Date/Time:   8:59 PM  Removal: Spontaneous      Appearance: Normal      Measurements:  Birth Weight:      Birth Length:     Head Circumference:       Chest Circumference:      Abdominal Girth: Other Providers:    Melita FINLEY;BIRKELAND, CORINNE V;MADHURI PANTOJA;SINDI HDEZ;JAMES VILLARREAL Obstetrician;Primary Nurse;Primary  Nurse;Charge Nurse;Scrub Tech           Cord Blood Results:  Information for the patient's newbornGorge Lockhart Male [[id number]]   No results found for:  Payal Palma, PCTDIG, BILI, ABORHEXT, [address]    Information for the patient's newbornDonis Laity, Male [[id number]]   No results found for: APH, APCO2, APO2, AHCO3, ABEC, ABDC, O2ST, SITE, RSCOM, PHI, PCO2I, PO2I, HCO3I, SO2I, IBD     Information for the patient's newbornDonis Laity, Male [[id number]]   No results found for: EPHV, PCO2V, PO2V, HCO3V, O2STV, EBDV

## 2017-07-06 NOTE — DISCHARGE SUMMARY
Obstetrical Discharge Summary     Name: Tonya Swanson MRN: [id number]  SSN: [id number]    YOB: 1985  Age: 28 y.o. Sex: female      Admit Date: 2017    Discharge Date: 2017     Admitting Physician: Madonna Hoyos Rd, MD     Attending Physician:  Madonna Hoyos Rd, MD     Admission Diagnoses: Pregnancy; Labor abnormal    Procedures for this admission:     Discharge Diagnoses:   Information for the patient's :  Sammie Cevallos, Male [[id number]]   Delivery of a 6 lb 13.5 oz (3.105 kg) male infant via Vaginal, Spontaneous Delivery on 2017 at 8:52 PM  by . Apgars were 9 and 9. Discharge Condition: good    Discharge disposition: Home Narcotic pain medication    Hospital Course: Normal hospital course following the delivery. Patient Instructions:   Current Discharge Medication List      START taking these medications    Details   oxyCODONE-acetaminophen (PERCOCET) 5-325 mg per tablet Take 1-2 Tabs by mouth every four (4) hours as needed for Pain. Max Daily Amount: 12 Tabs. Qty: 10 Tab, Refills: 0         CONTINUE these medications which have NOT CHANGED    Details   prenatal multivit-ca-min-fe-fa tab Take  by mouth. AMBULATORY BREAST PUMP Double electric breast pump, dual setup  Z39.1  CHESTER=17  Qty: 1 Units, Refills: 0             Reference my discharge instructions.     Follow-up Appointments   Procedures    FOLLOW UP VISIT Appointment in: 6 Weeks     Standing Status:   Standing     Number of Occurrences:   1     Order Specific Question:   Appointment in     Answer:   6 Weeks        Signed By:  Madonna Hoyos Rd, MD     2017

## 2017-07-06 NOTE — PROGRESS NOTES
Bedside and Verbal shift change report given to Allyssa Metcalf RN (oncoming nurse) by Lise Arnett RN (offgoing nurse). Report given with SBAR, Kardex, Intake/Output and MAR.

## 2017-07-06 NOTE — LACTATION NOTE
This note was copied from a baby's chart. Discussed with mother her plan for feeding. Reviewed the benefits of exclusive breast milk feeding during the hospital stay. Informed her of the risks of using formula to supplement in the first few days of life as well as the benefits of successful breast milk feeding; referred her to the Breastfeeding booklet about this information. She acknowledges understanding of information reviewed and states that it is her plan to BF her infant. Will support her choice and offer additional information as needed. Pt will successfully establish breastfeeding by feeding in response to early feeding cues   or wake every 3h, will obtain deep latch, and will keep log of feedings/output. Taught to BF at hunger cues and or q 2-3 hrs and to offer 10-20 drops of hand expressed colostrum at any non-feeds. Breast Assessment  Left Breast: Small  (Round well formed breasts, colostrum easily hand expressed)  Left Nipple: Everted, Intact  Right Breast: Small   Right Nipple: Everted, Intact  Breast- Feeding Assessment  Attends Breast-Feeding Classes: Yes (BF basics reviewed, how milk is made, normal  BF behaviors, importance of S2S bonding, hand expression + BF in positions that promote breast crawl behaviors all reviewed)  Breast-Feeding Experience: No  Breast Trauma/Surgery: No  Type/Quality: Attempted (Infant spitty, dry heaves, mom taught to offer 10-20 drops of hand expressed colostrum q 2 hrs until infant is effectively feeding at breast)  Lactation Consultant Visits  Breast-Feedings: Attempted breast-feeding (Mom aware to offer hand expressed drops at breast and or finger feed drops, all tolerated well)  Mother/Infant Observation  Mother Observation: Alignment, Breast comfortable  Infant Observation: Opens mouth (Mom finger feeding drops of EBM, anticipatory guidance shared, aware to avoid periods of non-feeding.)  LATCH Documentation  Latch:  Too sleepy or reluctant, no latch achieved (Infant spitty,mom offering drops of colostrum)  Audible Swallowing: A few with stimulation  Type of Nipple: Everted (after stimulation)  Comfort (Breast/Nipple): Soft/non-tender (Practiced comfortable side lying BF positioning)  Hold (Positioning): Full assist, teach one side, mother does other, staff holds  [facility] Score: 6  Biological Nurturing breastfeeding principles taught. How Biological Nurturing (BN)  promotes optimal breastfeeding (BF) sessions discussed. Mother encouraged to seek comfortable semi-reclining breastfeeding positions. Infant placed frontally along maternal contour. Primitive innate feeding reflexes/behaviors of the  discussed. BN tips and techniques shared; assisted with comfortable breastfeeding positioning. Hand Expression Education:  Mom taught how to manually hand express her colostrum. Emphasized the importance of providing infant with valuable colostrum as infant rests skin to skin at breast.  Aware to avoid extended periods of non-feeding. Aware to offer 10-20+ drops of colostrum every 2-3 hours until infant is latching and nursing effectively. Taught the rationale behind this low tech but highly effective evidence based practice.

## 2017-07-07 VITALS
BODY MASS INDEX: 23.21 KG/M2 | DIASTOLIC BLOOD PRESSURE: 56 MMHG | WEIGHT: 131 LBS | TEMPERATURE: 97.7 F | OXYGEN SATURATION: 98 % | HEART RATE: 63 BPM | SYSTOLIC BLOOD PRESSURE: 115 MMHG | RESPIRATION RATE: 16 BRPM | HEIGHT: 63 IN

## 2017-07-07 PROCEDURE — 74011250637 HC RX REV CODE- 250/637: Performed by: OBSTETRICS & GYNECOLOGY

## 2017-07-07 RX ADMIN — IBUPROFEN 800 MG: 800 TABLET, FILM COATED ORAL at 06:40

## 2017-07-07 RX ADMIN — DOCUSATE SODIUM 100 MG: 100 CAPSULE ORAL at 10:27

## 2017-07-07 RX ADMIN — MUPIROCIN: 20 OINTMENT TOPICAL at 10:27

## 2017-07-07 RX ADMIN — OXYCODONE HYDROCHLORIDE AND ACETAMINOPHEN 1 TABLET: 10; 325 TABLET ORAL at 06:40

## 2017-07-07 RX ADMIN — OXYCODONE HYDROCHLORIDE AND ACETAMINOPHEN 1 TABLET: 10; 325 TABLET ORAL at 10:27

## 2017-07-07 RX ADMIN — OXYCODONE HYDROCHLORIDE AND ACETAMINOPHEN 1 TABLET: 10; 325 TABLET ORAL at 02:40

## 2017-07-07 NOTE — PROGRESS NOTES
Bedside and Verbal shift change report given to Lucille Cross RN (oncoming nurse) by Paul Wilks RN (offgoing nurse). Report given with SBAR, Kardex, Intake/Output and MAR.

## 2017-07-07 NOTE — DISCHARGE INSTRUCTIONS
POST DELIVERY DISCHARGE INSTRUCTIONS    Name: Izabella Knox  YOB: 1985      General:     Diet/Diet Restrictions:  Eight 8-ounce glasses of fluid daily (primarily water); avoid excessive caffeine intake. Meals/snacks as desired which are high in fiber and complex carbohydrates and low in fat and cholesterol. Physical Activity / Restrictions / Safety:     Avoid heavy lifting, no more that 15 lbs. For 2-3 weeks; No driving while taking narcotic pain medication. Post  patients should not drive until pain free. No intercourse until seen for your 6 week postpartum visit, no douching or tampon use. No swimming or tub baths for 6 weeks. May resume exercise in 4-6 weeks. Discharge Instructions/Special Treatment/Home Care Needs:     Continue prenatal vitamins. Continue to use squirt bottle with warm water on your episiotomy after each bathroom use until bleeding stops. If steri-strips applied to your incision, remove in 14 days. Take stool softeners daily. Call your doctor for the following:     Fever over 101 degrees by mouth. Vaginal bleeding heavier than a normal menstrual period or lost larger than a golf ball. Red streaks or increased swelling of legs, painful red streaks on your breast.  Painful urination, or increased pain, redness or discharge with your incision. Pain Management:     Pain Management:   Take Acetaminophen (Tylenol) or Ibuprofen (Advil, Motrin), as directed for pain. Use a warm Sitz bath 3 times daily to relieve episiotomy or hemorrhoidal discomfort. Heating pad to  incision as needed. For hemorrhoidal discomfort, use Tucks and Anusol cream as needed and directed.     Follow-Up Care:     Appointment with MD:   Follow-up Appointments   Procedures    FOLLOW UP VISIT Appointment in: 6 Weeks     Standing Status:   Standing     Number of Occurrences:   1     Order Specific Question:   Appointment in     Answer:   6 Weeks     Telephone number: [phone]    Signed By: Tori Talley MD                                                                                                   Date: 7/6/2017 Time: 7:34 PM

## 2017-07-07 NOTE — PROGRESS NOTES
Mother discharged to home with parents. Vitals within normal limits. Discharge teaching done at bedside and all questions answered. Prescription given to patient.

## 2017-07-07 NOTE — LACTATION NOTE
This note was copied from a baby's chart. Biological Nurturing breastfeeding principles taught. How Biological Nurturing (BN)  promotes optimal breastfeeding (BF) sessions discussed. Mother encouraged to seek comfortable semi-reclining breastfeeding positions. Infant placed frontally along maternal contour. Primitive innate feeding reflexes/behaviors of the  discussed. BN tips and techniques shared; assisted with comfortable breastfeeding positioning. Pt will successfully establish breastfeeding by feeding in response to early feeding cues   or wake every 3h, will obtain deep latch, and will keep log of feedings/output. Taught to BF at hunger cues and or q 2-3 hrs and to offer 10-20 drops of hand expressed colostrum at any non-feeds.       Breast Assessment  Left Breast: Small   Left Nipple: Everted, Intact, Short  Right Breast: Small   Right Nipple: Everted, Intact, Short  Breast- Feeding Assessment  Attends Breast-Feeding Classes: Yes (BF basics reviewed, how milk is made, normal  BF behaviors, importance of S2S bonding, hand expression + BF in positions that promote breast crawl behaviors all reviewed)  Breast-Feeding Experience: No  Breast Trauma/Surgery: No  Type/Quality: Attempted (Infant spitty, dry heaves, mom taught to offer 10-20 drops of hand expressed colostrum q 2 hrs until infant is effectively feeding at breast)  Lactation Consultant Visits  Breast-Feedings: Attempted breast-feeding (Mom aware to offer hand expressed drops at breast and or finger feed drops, all tolerated well)  Mother/Infant Observation  Mother Observation: Alignment, Holds breast, Lets baby end feeding, Nipple round on release, Recognizes feeding cues, Sleepy after feeding  Infant Observation: Latches nipple and aereolae, Rhythmic suck, Relaxed after feeding, Frenulum checked, Opens mouth, Lips flanged, upper, Lips flanged, lower, Audible swallows  LATCH Documentation  Latch: Grasps breast, tongue down, lips flanged, rhythmic sucking  Audible Swallowing: Spontaneous and intermittent (24 hours old)  Type of Nipple: Everted (after stimulation)  Comfort (Breast/Nipple): Filling, red/small blisters/bruises, mild/mod discomfort  Hold (Positioning): No assist from staff, mother able to position/hold infant  LATCH Score: 9  Chart shows numerous feedings, void, stool WNL. Discussed importance of monitoring outputs and feedings on first week of life. Discussed ways to tell if baby is  getting enough breast milk, ie  voids and stools, change in color of stool, and return to birth wt within 2 weeks. Follow up with pediatrician visit for weight check in 1-2 days (per AAP guidelines.)  Encouraged to call Warm Line  [phone] or [facility] at [phone] for any questions/problems that arise. Mother also given breastfeeding support group dates and times for any future needsAnticipatory guidance given. Questions answered. Discussed signs of baby's allergy, excema. Discussed engorgement management, when breast are soft and flat you are making more milk than when hard and engorged. If you should have to take a medication and MD says can't breast feed contact lactation office. Breast feed if you or the baby gets sick to pass along natural antibiotics. Tried to get baby to latch in bioloigic position,  Baby would not latch. Finger fed baby 7 drops, baby would still not latch. Washed baby's hair. Baby is hyperstimulated. I placed baby in laying down position, so baby got less stimulation, baby latched with good sucking bursts when Mom compressed the breast.  Mom not as anxious, feeling better about feeding.

## 2017-07-07 NOTE — LACTATION NOTE
This note was copied from a baby's chart. Mom upset that some people have made her feel like the baby isn't doing well. Mom teary. Insturted Mom that weight looks good. Baby hs been stooling, only 2 voids. Mom states\" I did not know to look for a green line on the diaiper. \"    Informed Mom to eat her breakfast, relax, and call LC when baby begins to wake. yes

## 2017-07-07 NOTE — PROGRESS NOTES
Post-Partum Day Number 2 Progress/Discharge Note    Patient doing well post-partum without significant complaint. She is voiding without difficulty, she reports normal lochia. She is ambulatiing without dizziness. Her pain is well controlled with oral pain medication. She is tolerating general diet. Will do circ at later time. Breast feeding. Has 3rd degree - concerned about pericare    Vitals:  Patient Vitals for the past 8 hrs:   BP Temp Pulse Resp   17 0538 115/56 97.7 °F (36.5 °C) 63 16     Temp (24hrs), Av °F (36.7 °C), Min:97.7 °F (36.5 °C), Max:98.3 °F (36.8 °C)        Exam:  Patient without distress. Lungs:  CTA bilaterally               CV: RRR with no rubs or gallops; no murmur               Abdomen soft, fundus firm at level of umbilicus/U-1, nontender               Lower extremities are negative for cords or tenderness; no swelling. Labs: No results found for this or any previous visit (from the past 24 hour(s)). Assessment and Plan:    Postpartum Day #2, S/P . Doing well.    - d/c home   - reviewed pericare, importance of avoiding constipation   - F/U 6wk postpartum check, sooner prn   - Percocet 5/325 #10

## 2017-07-09 ENCOUNTER — TELEPHONE (OUTPATIENT)
Dept: OBGYN CLINIC | Age: 32
End: 2017-07-09

## 2017-07-10 ENCOUNTER — OFFICE VISIT (OUTPATIENT)
Dept: OBGYN CLINIC | Age: 32
End: 2017-07-10

## 2017-07-10 VITALS
WEIGHT: 120 LBS | DIASTOLIC BLOOD PRESSURE: 72 MMHG | SYSTOLIC BLOOD PRESSURE: 111 MMHG | BODY MASS INDEX: 21.26 KG/M2 | HEART RATE: 75 BPM | HEIGHT: 63 IN

## 2017-07-10 DIAGNOSIS — R10.2 PERINEAL PAIN: ICD-10-CM

## 2017-07-10 RX ORDER — ACETAMINOPHEN 325 MG/1
TABLET ORAL
COMMUNITY
End: 2018-02-22

## 2017-07-10 NOTE — PROGRESS NOTES
Problem Visit-Complete    Chief Complaint   Vulvar Abnormaility      HPI  Roman Mars is a 28 y.o. female who presents for the evaluation of vaginal tear.  17.  3rd degree lac. The patient complains of painful to walk and move. Has decreased pain meds. Trying to do the sitz bath, which is more painful. Feeling a little better since she stopped doing them. It is located perineal area. Also just had a BM today. It was the first once since 17. The symptoms are moderate to severe. They started Friday evening, 17. Associated symptoms: no fever, drainage  Aggravating factors: none. Alleviating factors: none. Trying to breast feed. Baby won't latch on well so pumping and bottle feeding. Past Medical History:   Diagnosis Date    Anemia     Routine Papanicolaou smear 2016    Negative (no hpv) from [facility]     Past Surgical History:   Procedure Laterality Date    HX CHOLECYSTECTOMY  2010    HX WISDOM TEETH EXTRACTION       Social History     Occupational History    Not on file. Social History Main Topics    Smoking status: Never Smoker    Smokeless tobacco: Never Used      Comment: Never used vapor or e-cigs     Alcohol use No    Drug use: No    Sexual activity: Not Currently     Partners: Male     Birth control/ protection: None     Family History   Problem Relation Age of Onset    Thyroid Disease Mother     Cancer Father      Prostate     Thyroid Disease Maternal Grandmother     Thyroid Disease Other      6 Siblings of Mother h/o Hyperthyroidism        Allergies   Allergen Reactions    Amoxicillin Rash     Prior to Admission medications    Medication Sig Start Date End Date Taking? Authorizing Provider   acetaminophen (TYLENOL) 325 mg tablet Take  by mouth every four (4) hours as needed for Pain. Yes Historical Provider   oxyCODONE-acetaminophen (PERCOCET) 5-325 mg per tablet Take 1-2 Tabs by mouth every four (4) hours as needed for Pain.  Max Daily Amount: 12 Tabs. 7/6/17  Yes Yessi Birmingham MD   AMBULATORY BREAST PUMP Double electric breast pump, dual setup  Z39.1  CHESTER=7/7/17 5/16/17  Yes Yessi Birmingham MD   prenatal multivit-ca-min-fe-fa tab Take  by mouth. Yes Historical Provider              Objective:  Visit Vitals    /72    Pulse 75    Temp (P) 97.7 °F (36.5 °C)    Ht 5' 3\" (1.6 m)    Wt 120 lb (54.4 kg)    Breastfeeding Yes    BMI 21.26 kg/m2       Physical Exam:     Constitutional  · Appearance: well-nourished, well developed, alert, in no acute distress    HENT  · Head and Face: appears normal      Genitourinary  · External Genitalia: normal appearance for age; stitches well approximated, no drainage, induration, erythema  · Vagina: (digital exam only) no masses, induration, or fluctuance; stitches well approximated. Able to see stitches just inside introitus - intact  RV:  No masses, intact, small internal hemorrhoid; no fluctance    Neurologic/Psychiatric  · Mental Status:  · Orientation: grossly oriented to person, place and time  · Mood and Affect: mood normal, affect appropriate    Assessment:  3rd degree lac. .    Plan:   Reassured pt that appears to be healing well with no signs of infection, separation, hematoma, etc.  Might try sitz bath again later this week as skin edges heal more.   Could try cool pack if she prefers  Avoid constipation  Routine care  RTO for 6wk PP check

## 2017-07-10 NOTE — PATIENT INSTRUCTIONS
Learning About Getting In and Out of a Bathtub Safely  Introduction  Many falls happen during bathing. All that water makes the bathroom a slippery place. · You may no longer be able to step over the tub wall comfortably and safely. · You might lean on things that aren't meant to support your weight, like a towel bar or the shower curtain. There are several types of aids that will help keep you safe. You can buy them at [employer] or home improvement stores or online. What tools can you use to get in and out of the tub? Tub rail and grab bar    There are many types of bars and rails you can install on the walls next to your tub or on the edge of the tub. They will help keep you safe while you're getting in or out of the tub. It's important to have them permanently installed, rather than attached with suction. Transfer bench    The best transfer benches sit in the tub and extend out over the side. You sit on the bench. Lift one leg at a time into the tub, sliding your body over as you do so. Nonskid mats    Water makes both the floor of the tub and the bathroom floor slippery. You can buy adhesive strips that stick to the floor of the tub. Or you can use a nonskid tub mat. Outside the tub, make sure you use a rug with a nonskid bottom. Don't use a plain towel. Hand-held shower faucet    With a hand-held faucet, you can get clean without having to lower yourself into the tub. Hang a shower curtain to keep water from spilling out onto the floor. Follow-up care is a key part of your treatment and safety. Be sure to make and go to all appointments, and call your doctor if you are having problems. It's also a good idea to know your test results and keep a list of the medicines you take. Where can you learn more? Go to http://marlen-elly.info/. Enter R008 in the search box to learn more about \"Learning About Getting In and Out of a Bathtub Safely. \"  Current as of: March 24, 2017  Content Version: 11.3  © 9825-4854 [employer]. Care instructions adapted under license by [employer] (which disclaims liability or warranty for this information). If you have questions about a medical condition or this instruction, always ask your healthcare professional. [address] any warranty or liability for your use of this information.

## 2017-07-10 NOTE — TELEPHONE ENCOUNTER
PPP called requesting to see the MD today. Reports speaking to the on call MD on Friday. Reports increased pain at the 3rd degree tear. Please advise.

## 2017-07-10 NOTE — TELEPHONE ENCOUNTER
MD ON CALL, PHONE [phone] [employer]    Date:.7/9/2017    Pregnant: No postpartum    Reason for call:  Pain at site of 3rd degree repair. Sitz bath hurting. NO fever. Has two more pain meds left. Pain precautions, disc perineal care. Rec MD fu to assess healing. I discussed the option of ER evaluation if no improvement or if the symptoms are severe. I advised the caller to contact the office if they have any further questions or concerns.     Ale Iqbal MD

## 2017-07-13 LAB — GP B STREP DNA SPEC QL NAA+PROBE: NEGATIVE

## 2017-07-17 ENCOUNTER — HOSPITAL ENCOUNTER (OUTPATIENT)
Dept: FAMILY PLANNING/WOMEN'S HEALTH CLINIC | Age: 32
Discharge: HOME | End: 2017-07-17

## 2017-07-17 NOTE — PROGRESS NOTES
[address] [facility]  [address]  [address] [place] [address] Drive, [facility] [address]  Dept: [phone]    LACTATION REPORT TO HEALTHCARE PROVIDER    Date: 2017    Dear Mariel Blackwell MD: Yaakov Cota: This is to inform you that I have seen    Baby name (First Name, Last Name): Mychal Persaud                                  Mother: Thaddeus Gee    Reason for Visit: difficulty latching;insufficient weight gain; Other (Comment) (recent hospitalization for poor feeding)    Baby date of birth: 17     Baby's Gestational age: 45.11  Birth Weight (Born Outside of [facility]): 3.105 kg (6 lb 13.5 oz)     Discharge weight: 2.962 kg (6 lb 8.5 oz)     Date                  Weight        Recorded Weight Date 1: 17  Recorded Weight 1: 3.204 kg (7 lb 1 oz)        Recorded  Weight Date 2: 17  Recorded Weight 2: 3.079 kg (6 lb 12.6 oz) (naked)    Pre-feed Weight: 3.096 kg (6 lb 13.2 oz)  Post-feed Weight Left Breast: 3.113 kg (6 lb 13.8 oz)  Post-feed Weight Right Breast: 3.133 kg (6 lb 14.5 oz)     Total amount of milk transferred: Total Weight Gain: 1.3 oz    Total time of feedin minutes         Amount of milk/formula supplemented: 0    Breast Assessment:  Left Breast: Medium  Left Nipple: Intact; Everted; Short  Right Breast: Medium  Right Nipple: Everted; Intact; Short    Oral assessement:  (labial lip tie and posterior tongue tie noted) Discussed with parents but not referred for evaluation by ENT at this time, will suggest if infant does not continue to improve with milk transfer. Information provided. Baby fed vigorously but tires easily, taught mother how to use breast compression to increase flow of milk to baby. Total intake 1.3 ounces in 40 minutes.  Discussed with parents expected intake for a 7 lb baby of about 17 1/2 ounces in 24 hours and based on 8 feedings a day that would be 2.2 ounces per feeding, however Joyce Alvarado is feeding about 12 times or more per 24 hours which makes this intake acceptable. Discussed supplementing after feeding at breast of 1 ounce expressed milk or formula as an option to encourage less frequent feeding which may help preserve infant energy levels and give mother more rest, but encouraged continued frequent BF on demand if mother prefers that. Also encouraged to discuss feeding plan with Pediatrician at scheduled visit there tomorrow. Mother is renting hospital grade breast pump and encouraged her to continue to pump after some feedings for increasing supply, especially if breast still feels full after Bard Tucker has fed. [facility] 1 week. Encouraged mother to call before with any questions or concerns. Will follow up by phone tomorrow. Enclosed please find a copy of the instructions given to the patient. Follow-up/Consultation scheduled on[de-identified] 07/24/17    Please feel free to contact me at [facility] with any questions or concerns.     Thank you,    Yves Anderson RN IBCLC

## 2017-07-17 NOTE — DISCHARGE INSTRUCTIONS
HELPING BABY LATCH CORRECTLY                                             1. Stimulate the rooting reflex by making a circular motion around the lips, in one direction. Give baby verbal and visual cues by saying Leticia Egan and demonstrating with your face approximately 10 inches away from babys face to elicit a wide, open mouth. 2. Stimulate let down by massaging or pumping breasts for 1-2 minutes prior to latching. 3. Allow baby to suck clean finger, placed nail side down. 4. Place nursing pillow or bed pillows next to you high enough to bring baby to the level of the nipple. 5. Position baby belly to belly with you, making sure that head, neck and torso are in alignment. 6. Support babys head during latch. Babys body weight should rest comfortably on nursing pillow. 7. Roll the nipple gently between the fingers to make it stand out. 8. Fingers should be placed at the edge of areola and the breast should be compressed so that it enters parallel to babys open mouth, with the nipple slightly pointed toward the roof of the babys mouth. (Think of compressing a large sandwich to fit into your mouth!)   9. Wait for WIDE mouth, and then bring the baby to the breast, getting as much of the areola into the mouth as possible. Gentle downward pressure on the lower jaw as baby is coming to breast, may be helpful in keeping mouth open wide initially. 10. Hold the breast compressed and the baby gently in place to allow the baby to feel the nipple and begin suckling.  (U-hold may be helpful)  11. If baby has not latched correctly, begin the process again. 12. If baby is latched correctly, lips should be flanged outward, nose and chin should lightly   touch the breast.  No clicking or lip smacking should be heard. 13. Swallowing should be observed intermittently during entire feeding. Baby should pause   for breathing.   14. It is normal to have some brief discomfort initially when the baby latches, but it should pedro pablo as the feeding progresses, particularly once the milk lets down. 15. If baby tends to compress nipple during feedings, un-latch after first let-down, roll nipples into round shape and re-latch. HELPING BABY LATCH CORRECTLY                                             16. Stimulate the rooting reflex by making a circular motion around the lips, in one direction. Give baby verbal and visual cues by saying Darreld Twan and demonstrating with your face approximately 10 inches away from babys face to elicit a wide, open mouth. 17. Stimulate let down by massaging or pumping breasts for 1-2 minutes prior to latching. 25. Allow baby to suck clean finger, placed nail side down. 19. Place nursing pillow or bed pillows next to you high enough to bring baby to the level of the nipple. 21. Position baby belly to belly with you, making sure that head, neck and torso are in alignment. 21. Support babys head during latch. Babys body weight should rest comfortably on nursing pillow. 22. Roll the nipple gently between the fingers to make it stand out. 23. Fingers should be placed at the edge of areola and the breast should be compressed so that it enters parallel to babys open mouth, with the nipple slightly pointed toward the roof of the babys mouth. (Think of compressing a large sandwich to fit into your mouth!)   24. Wait for WIDE mouth, and then bring the baby to the breast, getting as much of the areola into the mouth as possible. Gentle downward pressure on the lower jaw as baby is coming to breast, may be helpful in keeping mouth open wide initially. 25. Hold the breast compressed and the baby gently in place to allow the baby to feel the nipple and begin suckling.  (U-hold may be helpful)  26. If baby has not latched correctly, begin the process again.   27. If baby is latched correctly, lips should be flanged outward, nose and chin should lightly   touch the breast.  No clicking or lip smacking should be heard.  28. Swallowing should be observed intermittently during entire feeding. Baby should pause   for breathing. 29. It is normal to have some brief discomfort initially when the baby latches, but it should  pedro pablo as the feeding progresses, particularly once the milk lets down. 30. If baby tends to compress nipple during feedings, un-latch after first let-down, roll nipples into round shape and re-latch. INFORMATION ON TONGUE TIE:    Tongue-tie is an anatomical abnormality affecting the ability of the tongue to move in an appropriate way to facilitate proper suckling at the breast. This may affect either the ability to stick the tongue out or to lift the tongue upward or both. Tongue-tie can sometimes be seen when the baby raises the tongue and sometimes can on ly be felt by an experienced medical professional on exam.    Joellyn Shaggy can contribute to nipple pain and damage, ineffective breast emptying, tiring easily at the breast, short sleep cycles, reflux and colic, lip blisters, and poor weight gain. In a majority of cases,  tongue tie is accompanied by lip tie as well which prevents the upper lip from flanging out properly. Almost all tongue-tie and lip-tie can be treated with a simple medical procedure to release the tie. This procedure should be performed by an experienced medical professional. Aaron Bertrand refers clients suspected of tongue or lip tie to Dr. Josi Liu at [address] [place] [address], and [facility] located at [address] [place] [address]. [phone]    For additional information on tongue and lip tie and post procedure exercises go to [facility]. TIPS FOR WEANING FROM NIPPLE SHIELD    Using a nipple shield may result in decreased milk transfer and supply. Your baby should be followed closely by a healthcare provider when using the nipple shield.     Begin the process when baby is nursing effectively with the shield and has a consistent pattern of weight gain.    Positioning is very important so take time to position baby to the level of the nipple and turn her belly to belly with you. Her head should be slightly higher than her bottom. Attempt baby led latching in a semi-reclined prone position. This allows babys jaw to open further and the tongue to drop forward so the nipple goes deeper into the mouth. Attempt when baby is not overly hungry or when she is drowsy. Try once or twice a day in the beginning to minimize frustration and gradually increase the number of attempts per day. Make attempts short at first (15 minutes or so). If baby is struggling on one breast then offer the other. Take a break and soothe baby (and mom) if process becomes overwhelming or stressful. Start with the shield on then remove prison through feeding and attempt to latch once milk has let down, nipple is pulled out and baby is somewhat satiated. Use breast compression to increase milk flow when swallowing starts to slow. If attempting without the shield, pump breasts for 2-5 minutes right before feeding to stimulate let down and pull out the nipple. It may be helpful to dribble a little expressed milk over areola to stimulate swallowing. It may take multiple attempts for baby to latch deeply. If latch is painful or swallowing is not heard within several minutes, attempt to re-latch baby deeper. Audible swallowing during the feed,a softer breast and a content baby afterward usually indicates an adequate feeding. When using a shield, most women need to pump with hospital grade pump immediately after most nursing sessions to maintain milk supply because the silicone shield prevents direct contact of the babys mouth to the skin of the breast which can decrease milk production. Baby should be weighed weekly on the same scale in the same clothing to make sure weight gain is adequate during the transition process.  Monitor the babys wet and dirty diapers during the transition to nursing without the shield. Infants over 10days old should produce at least 6 wet diapers and 4 yellow seedy stools in 24 hours. Call your pediatrician if output is not adequate. INCREASING MILK SUPPLY    [address] follows the recommendations of the Academy of Breastfeeding Medicine and International Lactation [employer] Association for increasing milk supply. These recommendations are based upon current evidence and apply to women experiencing difficulties with a low rate of milk production. · Skin to skin contact between mother and baby encourages frequent feeding and stimulates the release of hormones that help the milk to let down. · Lactating women need to maintain adequate calorie and fluid intake and rest when baby is sleeping. Try to take in an additional 500 calories per day per baby. Drink to thirst but it is not necessary to force fluids. · Self breast massage prior to feeding and breast compression while the baby is nursing may help to increase the intake of breast milk. Massage, light stroking toward the nipple and gentle shaking while leaning forward when baby is not latched on may help stimulate a second let down. · Relaxation techniques such as deep breathing, meditation, soft music and dim lighting may help to facilitate let down. · Increase stimulation and milk removal by feeding frequently and latching baby deeply to promote thorough drainage of the breasts. Pump if baby is not latching. (Minimum 8 feedings/pumpings in a 24 hour period and 12 times maximum)  · Pumping for 10-15 minutes after breastfeeding with an automatic cycling breast pump that is capable of draining both breasts, hospital grade if available, will increase stimulation of the breasts and increase milk production. It may take several (2-3) days before you notice an increase.   The pump should be adjusted to the highest maximum comfortable vacuum to enhance milk flow and milk yield.  Flange size is important so your nipple can move freely in the flange as not to cause the nipple to swell. Graduate vacuum suction to feel like babys suck. You can use the letdown button several times during your pumping session to promote more efficient drainage of the breast.  · Hand expression after pumping may increase milk yield and fat content of milk. You may find it helpful to view the video demonstrating several hand expression techniques found at: [email or web address] html  · Anemia, thyroid malfunction, polycystic ovary syndrome and hormonal birth control such as the mini pill or Depo-provera can impact production in some women and may need to be evaluated by your healthcare provider. · If taking thyroid medicine, thyroid levels should be periodically checked and medicine adjusted as needed. · Contact your healthcare provider regarding pharmacological or herbal products to increase milk supply. · Avoid products that contain peppermint (Altoids, Starlight mints) and sapna. Practice patience. Remember baby is learning a new skill and with time and practice will get better at latching! Follow up with Pediatrician as scheduled and follow recommendations for supplementing as directed by her. Continue to pump using [facility] [facility] grade pump after feedings and whenever formula supplement is given when able to stimulate increased milk production. Increase fluid intake and try MMT 3 to 4 cups a day for increasing milk supply. Follow up at [facility] in one week. Call [facility] with any questions or concerns.

## 2017-07-24 ENCOUNTER — HOSPITAL ENCOUNTER (OUTPATIENT)
Dept: FAMILY PLANNING/WOMEN'S HEALTH CLINIC | Age: 32
Discharge: HOME | End: 2017-07-24

## 2017-07-24 NOTE — DISCHARGE INSTRUCTIONS
Baby's Name:  Deb JavierVikashPiyushbrittanie Weight / Amount Date/Location     Birth Weight  6 lb 13.5 oz  07/05/17    Discharge Weight  6 lb 8.5 oz      Weight Check  7 lb 1 oz  07/14/17    Last Weight  6 lb 13.2 oz  07/17/17 [facility]    Today's Weight - Pre-feed  7 lb 9.8 oz  07/24/17    Today's Weight - Post-feed  7 lb 11.7   07/24/17    Total Amount of Milk Transferred  1.9 oz          Notes:  Baby has gained 12.8 ounces in 7 days. Fed well at breast with and without shield. Reviewed positioning and latch techniques. Encouraged continued frequent BF on demand. Encouraged mother to follow up as needed and call with any questions or concerns.           Next weight check   Date Location   07/27/17  Dr. Zachary Nuno

## 2017-08-23 ENCOUNTER — OFFICE VISIT (OUTPATIENT)
Dept: OBGYN CLINIC | Age: 32
End: 2017-08-23

## 2017-08-23 VITALS
SYSTOLIC BLOOD PRESSURE: 98 MMHG | HEIGHT: 63 IN | WEIGHT: 115 LBS | DIASTOLIC BLOOD PRESSURE: 58 MMHG | BODY MASS INDEX: 20.38 KG/M2 | HEART RATE: 68 BPM

## 2017-08-23 DIAGNOSIS — M62.89 PELVIC FLOOR DYSFUNCTION: ICD-10-CM

## 2017-08-23 DIAGNOSIS — N39.46 MIXED STRESS AND URGE URINARY INCONTINENCE: ICD-10-CM

## 2017-08-23 NOTE — PATIENT INSTRUCTIONS
After Your Delivery (the Postpartum Period): Care Instructions  Your Care Instructions  Congratulations on the birth of your baby. Like pregnancy, the  period can be a time of excitement, raeann, and exhaustion. You may look at your wondrous little baby and feel happy. You may also be overwhelmed by your new sleep hours and new responsibilities. At first, babies often sleep during the days and are awake at night. They do not have a pattern or routine. They may make sudden gasps, jerk themselves awake, or look like they have crossed eyes. These are all normal, and they may even make you smile. In these first weeks after delivery, try to take good care of yourself. It may take 4 to 6 weeks to feel like yourself again, and possibly longer if you had a  birth. You will likely feel very tired for several weeks. Your days will be full of ups and downs, but lots of raeann as well. Follow-up care is a key part of your treatment and safety. Be sure to make and go to all appointments, and call your doctor if you are having problems. It's also a good idea to know your test results and keep a list of the medicines you take. How can you care for yourself at home? Take care of your body after delivery  · Use pads instead of tampons for the bloody flow that may last as long as 2 weeks. · Ease cramps with ibuprofen (Advil, Motrin). · Ease soreness of hemorrhoids and the area between your vagina and rectum with ice compresses or witch hazel pads. · Ease constipation by drinking lots of fluid and eating high-fiber foods. Ask your doctor about over-the-counter stool softeners. · Cleanse yourself with a gentle squeeze of warm water from a bottle instead of wiping with toilet paper. · Take a sitz bath in warm water several times a day. · Wear a good nursing bra. Ease sore and swollen breasts with warm, wet washcloths. · If you are not breastfeeding, use ice rather than heat for breast soreness.   · Your period may not start for several months if you are breastfeeding. You may bleed more, and longer at first, than you did before you got pregnant. · Wait until you are healed (about 4 to 6 weeks) before you have sexual intercourse. Your doctor will tell you when it is okay to have sex. · Try not to travel with your baby for 5 or 6 weeks. If you take a long car trip, make frequent stops to walk around and stretch. Avoid exhaustion  · Rest every day. Try to nap when your baby naps. · Ask another adult to be with you for a few days after delivery. · Plan for  if you have other children. · Stay flexible so you can eat at odd hours and sleep when you need to. Both you and your baby are making new schedules. · Plan small trips to get out of the house. Change can make you feel less tired. · Ask for help with housework, cooking, and shopping. Remind yourself that your job is to care for your baby. Know about help for postpartum depression  · \"Baby blues\" are common for the first 1 to 2 weeks after birth. You may cry or feel sad or irritable for no reason. · Rest whenever you can. Being tired makes it harder to handle your emotions. · Go for walks with your baby. · Talk to your partner, friends, and family about your feelings. · If your symptoms last for more than a few weeks, or if you feel very depressed, ask your doctor for help. · Postpartum depression can be treated. Support groups and counseling can help. Sometimes medicine can also help. Stay healthy  · Eat healthy foods so you have more energy, make good breast milk, and lose extra baby pounds. · If you breastfeed, avoid alcohol and drugs. Stay smoke-free. If you quit during pregnancy, congratulations. · Start daily exercise after 4 to 6 weeks, but rest when you feel tired. · Learn exercises to tone your belly. Do Kegel exercises to regain strength in your pelvic muscles. You can do these exercises while you stand or sit.   ¨ Squeeze the same muscles you would use to stop your urine. Your belly and thighs should not move. ¨ Hold the squeeze for 3 seconds, and then relax for 3 seconds. ¨ Start with 3 seconds. Then add 1 second each week until you are able to squeeze for 10 seconds. ¨ Repeat the exercise 10 to 15 times for each session. Do three or more sessions each day. · Find a class for new mothers and new babies that has an exercise time. · If you had a  birth, give yourself a bit more time before you exercise, and be careful. When should you call for help? Call 911 anytime you think you may need emergency care. For example, call if:  · You passed out (lost consciousness). Call your doctor now or seek immediate medical care if:  · You have severe vaginal bleeding. This means you are passing blood clots and soaking through a pad each hour for 2 or more hours. · You are dizzy or lightheaded, or you feel like you may faint. · You have a fever. · You have new belly pain, or your pain gets worse. Watch closely for changes in your health, and be sure to contact your doctor if:  · Your vaginal bleeding seems to be getting heavier. · You have new or worse vaginal discharge. · You feel sad, anxious, or hopeless for more than a few days. · You do not get better as expected. Where can you learn more? Go to http://marlen-elly.info/. Enter A461 in the search box to learn more about \"After Your Delivery (the Postpartum Period): Care Instructions. \"  Current as of: 2017  Content Version: 11.3  © 6667-1273 [employer]. Care instructions adapted under license by [employer] (which disclaims liability or warranty for this information). If you have questions about a medical condition or this instruction, always ask your healthcare professional. [address] any warranty or liability for your use of this information.

## 2017-08-23 NOTE — PROGRESS NOTES
Postpartum evaluation    Lise Oliveros is a 28 y.o. female who presents for a postpartum exam.     She is now six weeks post normal spontaneous vaginal delivery on 7/5/17. Her baby is doing well. Was hospitalized for a few days for dehydration, poor feeding. Doing well now. She has had no menses since delivery. She has had the following significant problems since her delivery: Leaks urine, desires PT Referral. Also, concern with mole on top of her head, in the center towards the back. Had 3rd degree tear, wants to make sure has healed. A little sore when she walks for a long time, but no other issues. The patient is breastfeeding with some difficulty, reports low milk supply. Also bottle feeding. The patient would not like birth control. Uses natural family planning. Has not been sexually active since delivery. She is currently taking: PNV. She is due for her next AE in 1 yr.      Visit Vitals    BP 98/58    Pulse 68    Ht 5' 3\" (1.6 m)    Wt 115 lb (52.2 kg)    Breastfeeding Yes    BMI 20.37 kg/m2       PHYSICAL EXAMINATION    Constitutional  · Appearance: well-nourished, well developed, alert, in no acute distress    HENT  · Head and Face: appears normal    Neck  · Inspection/Palpation: normal appearance, no masses or tenderness  · Lymph Nodes: no lymphadenopathy present  · Thyroid: gland size normal, nontender, no nodules or masses present on palpation    Breasts  · Inspection of Breasts: breasts symmetrical, no skin changes, no discharge present, nipple appearance normal, no skin retraction present  · Palpation of Breasts and Axillae: no masses present on palpation, no breast tenderness  · Axillary Lymph Nodes: no lymphadenopathy present    Chest  · Respiratory Effort: breathing unlabored  · Auscultation: normal breath sounds    Cardiovascular  · Heart:  · Auscultation: regular rate and rhythm without murmur    Gastrointestinal  · Abdominal Examination: abdomen non-tender to palpation, normal bowel sounds, no masses present  · Liver and spleen: no hepatomegaly present, spleen not palpable  · Hernias: no hernias identified    Genitourinary  · External Genitalia: normal appearance for age, no discharge present, no tenderness present, no inflammatory lesions present, no masses present, no atrophy present  · Vagina: normal vaginal vault without central or paravaginal defects, no discharge present, no inflammatory lesions present, no masses present  · Bladder: non-tender to palpation  · Urethra: appears normal  · Cervix: normal   · Uterus: normal size, shape and consistency  · Adnexa: no adnexal tenderness present, no adnexal masses present  · Perineum: perineum within normal limits, no evidence of trauma, no rashes or skin lesions present  · Anus: anus within normal limits, no hemorrhoids present  · Inguinal Lymph Nodes: no lymphadenopathy present    Skin  · General Inspection: no rash, no lesions identified    Neurologic/Psychiatric  · Mental Status:  · Orientation: grossly oriented to person, place and time  · Mood and Affect: mood normal, affect appropriate    Assessment:  Normal postpartum check  Urinary urgency/incontinence    Plan:  RTO for AE.  1yr.   OK to resume nl activities  Refer to PT  F/u with PCP for eval of mole    Orders Placed This Encounter    CULTURE, URINE    REFERRAL TO PHYSICAL THERAPY     Referral Priority:   Routine     Referral Type:   PT/OT/ST     Referral Reason:   Specialty Services Required     Referred to Provider:   Willie Eastman PT

## 2017-08-26 LAB
BACTERIA UR CULT: ABNORMAL
BACTERIA UR CULT: ABNORMAL

## 2017-08-28 RX ORDER — CEPHALEXIN 500 MG/1
500 CAPSULE ORAL 4 TIMES DAILY
Qty: 40 CAP | Refills: 0 | Status: SHIPPED | OUTPATIENT
Start: 2017-08-28 | End: 2017-09-07

## 2017-09-22 ENCOUNTER — HOSPITAL ENCOUNTER (EMERGENCY)
Age: 32
Discharge: HOME | End: 2017-09-22
Attending: EMERGENCY MEDICINE

## 2017-09-22 VITALS
BODY MASS INDEX: 20.23 KG/M2 | WEIGHT: 114.2 LBS | HEART RATE: 56 BPM | OXYGEN SATURATION: 96 % | DIASTOLIC BLOOD PRESSURE: 67 MMHG | SYSTOLIC BLOOD PRESSURE: 95 MMHG | TEMPERATURE: 97.5 F | RESPIRATION RATE: 18 BRPM | HEIGHT: 63 IN

## 2017-09-22 DIAGNOSIS — N30.00 ACUTE CYSTITIS WITHOUT HEMATURIA: Primary | ICD-10-CM

## 2017-09-22 LAB
BILIRUB UR QL: NEGATIVE
GLUCOSE UR QL STRIP.AUTO: NEGATIVE MG/DL
HCG UR QL: NEGATIVE
KETONES UR-MCNC: NEGATIVE MG/DL
LEUKOCYTE ESTERASE UR QL STRIP: ABNORMAL
NITRITE UR QL: NEGATIVE
PH UR: 6 [PH] (ref 5–8)
PROT UR QL: NEGATIVE MG/DL
RBC # UR STRIP: NEGATIVE /UL
SP GR UR: 1.01 (ref 1–1.03)
UROBILINOGEN UR QL: 0.2 EU/DL (ref 0.2–1)

## 2017-09-22 RX ORDER — CEPHALEXIN 500 MG/1
500 CAPSULE ORAL 4 TIMES DAILY
Qty: 40 CAP | Refills: 0 | Status: SHIPPED | OUTPATIENT
Start: 2017-09-22 | End: 2017-10-02

## 2017-09-23 NOTE — DISCHARGE INSTRUCTIONS
Urinary Tract Infection in Women: Care Instructions  Your Care Instructions    A urinary tract infection, or UTI, is a general term for an infection anywhere between the kidneys and the urethra (where urine comes out). Most UTIs are bladder infections. They often cause pain or burning when you urinate. UTIs are caused by bacteria and can be cured with antibiotics. Be sure to complete your treatment so that the infection goes away. Follow-up care is a key part of your treatment and safety. Be sure to make and go to all appointments, and call your doctor if you are having problems. It's also a good idea to know your test results and keep a list of the medicines you take. How can you care for yourself at home? · Take your antibiotics as directed. Do not stop taking them just because you feel better. You need to take the full course of antibiotics. · Drink extra water and other fluids for the next day or two. This may help wash out the bacteria that are causing the infection. (If you have kidney, heart, or liver disease and have to limit fluids, talk with your doctor before you increase your fluid intake.)  · Avoid drinks that are carbonated or have caffeine. They can irritate the bladder. · Urinate often. Try to empty your bladder each time. · To relieve pain, take a hot bath or lay a heating pad set on low over your lower belly or genital area. Never go to sleep with a heating pad in place. To prevent UTIs  · Drink plenty of water each day. This helps you urinate often, which clears bacteria from your system. (If you have kidney, heart, or liver disease and have to limit fluids, talk with your doctor before you increase your fluid intake.)  · Urinate when you need to. · Urinate right after you have sex. · Change sanitary pads often. · Avoid douches, bubble baths, feminine hygiene sprays, and other feminine hygiene products that have deodorants.   · After going to the bathroom, wipe from front to back.  When should you call for help? Call your doctor now or seek immediate medical care if:  · Symptoms such as fever, chills, nausea, or vomiting get worse or appear for the first time. · You have new pain in your back just below your rib cage. This is called flank pain. · There is new blood or pus in your urine. · You have any problems with your antibiotic medicine. Watch closely for changes in your health, and be sure to contact your doctor if:  · You are not getting better after taking an antibiotic for 2 days. · Your symptoms go away but then come back. Where can you learn more? Go to http://marlen-elly.info/. Enter A490 in the search box to learn more about \"Urinary Tract Infection in Women: Care Instructions. \"  Current as of: November 28, 2016  Content Version: 11.3  © 4499-7840 [employer]. Care instructions adapted under license by [employer]Â® (which disclaims liability or warranty for this information). If you have questions about a medical condition or this instruction, always ask your healthcare professional. [address] any warranty or liability for your use of this information.

## 2017-09-23 NOTE — UC PROVIDER NOTE
Patient is a 33 y.o. female presenting with frequency. The history is provided by the patient. Urinary Frequency    This is a new problem. The current episode started more than 2 days ago. The problem occurs every urination. The problem has not changed since onset. The quality of the pain is described as burning. There has been no fever. Associated symptoms include frequency and urgency. Pertinent negatives include no chills. The patient is not pregnant. Past Medical History:   Diagnosis Date    Anemia     Routine Papanicolaou smear 07/20/2016    Negative (no hpv) from [address]        Past Surgical History:   Procedure Laterality Date    HX CHOLECYSTECTOMY  07/2010    HX WISDOM TEETH EXTRACTION  2009         Family History   Problem Relation Age of Onset    Thyroid Disease Mother     Cancer Father      Prostate     Thyroid Disease Maternal Grandmother     Thyroid Disease Other      6 Siblings of Mother h/o Hyperthyroidism         Social History     Social History    Marital status:      Spouse name: N/A    Number of children: N/A    Years of education: N/A     Occupational History    Not on file. Social History Main Topics    Smoking status: Never Smoker    Smokeless tobacco: Never Used      Comment: Never used vapor or e-cigs     Alcohol use No    Drug use: No    Sexual activity: Not Currently     Partners: Male     Birth control/ protection: None     Other Topics Concern    Not on file     Social History Narrative                ALLERGIES: Amoxicillin    Review of Systems   Constitutional: Negative for chills. Genitourinary: Positive for frequency and urgency. Vitals:    09/22/17 1955   BP: 95/67   Pulse: (!) 56   Resp: 18   Temp: 97.5 °F (36.4 °C)   SpO2: 96%   Weight: 51.8 kg (114 lb 3.2 oz)   Height: 5' 3\" (1.6 m)       Physical Exam   Constitutional: She appears well-developed and well-nourished.    Eyes: Conjunctivae are normal.   Pulmonary/Chest: Effort normal.   Skin: Skin is warm and dry. Psychiatric: She has a normal mood and affect. Her behavior is normal. Thought content normal.   Nursing note and vitals reviewed. MDM     Differential Diagnosis; Clinical Impression; Plan:     CLINICAL IMPRESSION:  Acute cystitis without hematuria  (primary encounter diagnosis)    Plan:  1. keflex  2.   3.   Amount and/or Complexity of Data Reviewed:   Clinical lab tests:  Ordered and reviewed  Risk of Significant Complications, Morbidity, and/or Mortality:   Presenting problems: Moderate  Diagnostic procedures: Moderate  Management options:   Moderate  Progress:   Patient progress:  Stable      Procedures

## 2017-09-25 ENCOUNTER — PATIENT OUTREACH (OUTPATIENT)
Dept: OTHER | Age: 32
End: 2017-09-25

## 2017-09-25 NOTE — PROGRESS NOTES
Transition Of Care Note    Patient discharged from [facility] on 17 for Acute Cystitis. Medical History:     Past Medical History:   Diagnosis Date    Anemia     Routine Papanicolaou smear 2016    [facility] by [facility] to perform post Urgent Care discharge assessment. Verified  and zip code with patient as identifiers. Provided introduction to self, and explanation of the Nurse Care Manager role. Medication:   Performed medication reconciliation with patient, and patient verbalizes understanding of administration of home medications. There were no barriers to obtaining medications identified at this time. Patient states that she thinks ABX is working, she is feeling better. Keflex 500mg     Support system:  patient and spouse    Discharge Instructions :  Reviewed discharge instructions with patient. Patient verbalizes understanding of discharge instructions and follow-up care. Red Flags:  Call your doctor now or seek immediate medical care if:  ·Symptoms such as fever, chills, nausea, or vomiting get worse or appear for the first time. ·You have new pain in your back just below your rib cage. This is called flank pain. [place]ron Clement is new blood or pus in your urine. ·You have any problems with your antibiotic medicine. Watch closely for changes in your health, and be sure to contact your doctor if:  ·You are not getting better after taking an antibiotic for 2 days. Encouraged patient to drink plenty of fluids. ·Your symptoms go away but then come back. Advance Care Planning:   Patient was offered the opportunity to discuss advance care planning:  no     Does patient have an Advance Directive:  no   If no, did you provide information on [facility] Connections?  no     PCP/Specialist follow up: Patient does not have a PCP, but is looking at several Dr's in the area.  She said that the area has limited Dr's and may be January before they are taking new patients. Offered to help with finding a PCP but declined. Reviewed red flags with patient, and patient verbalizes understanding. Patient given an opportunity to ask questions. No other clinical/social/functional needs noted. The patient expressed thanks, offered no additional questions and ended the call. Will f/u 10/10/17.

## 2017-10-05 ENCOUNTER — HOSPITAL ENCOUNTER (OUTPATIENT)
Dept: PHYSICAL THERAPY | Age: 32
Discharge: HOME | End: 2017-10-05
Payer: COMMERCIAL

## 2017-10-05 PROCEDURE — 97110 THERAPEUTIC EXERCISES: CPT | Performed by: PHYSICAL MEDICINE & REHABILITATION

## 2017-10-05 PROCEDURE — 97162 PT EVAL MOD COMPLEX 30 MIN: CPT | Performed by: PHYSICAL MEDICINE & REHABILITATION

## 2017-10-05 PROCEDURE — 97140 MANUAL THERAPY 1/> REGIONS: CPT | Performed by: PHYSICAL MEDICINE & REHABILITATION

## 2017-10-05 PROCEDURE — 97530 THERAPEUTIC ACTIVITIES: CPT | Performed by: PHYSICAL MEDICINE & REHABILITATION

## 2017-10-05 NOTE — PROGRESS NOTES
PT INITIAL EVALUATION NOTE 2-15    Patient Name: Luis Rader  Date:10/5/2017  : 1985  [x]  Patient  Verified  Payor: Ed Avers / Plan: Stacy Milling / Product Type: PPO /    In time: 10:45  Out time: 11:45  Total Treatment Time (min): 60  Visit #: 1    Treatment Area: Other specified disorders of muscle [M62.89]    SUBJECTIVE  Pain Level (0-10 scale): 0/10    Any medication changes, allergies to medications, adverse drug reactions, diagnosis change, or new procedure performed?: [] No    [x] Yes (see summary sheet for update)  Subjective:     Patient is a 33year old female post partum 17, vaginal delivery with 3rd degree tear, baby had arm overhead on delivery. Currently nursing. She works part time, commutes to [employer] for full time classes, is struggling to tolerate the drive due to urgency, incontinence. She is having increased urgency, will leak urine when trying to get to toilet. She is not having loss of urine with cough/sneeze/laugh. Also having difficulty controlling gas. She does not feel fully empty when she urinates and will sometimes double void. She estimates she is urinating every 30 mins or more frequently. Her urine is light straw color. Since delivery has had 2 bladder infections, 2 yeast infections, this is unusual for her. She did have increased frequency prior to pregnancy. Prior to pregnancy she was an avid runner, ultra marathoner. She ran through first trimester then switched to swimming. She feels she is not yet ready to return to running any distance due to urinary urge and frequency. Her goal is to return to running 5M daily. She has a BM several times daily, rated 3-4 on the Sullivan stool scale. She is a vegetarian. She is not having pain with IC  She not having her period.       PLOF: History of urinary frequency, no incontinence  Mechanism of Injury: vaginal delivery  Previous Treatment/Compliance: none  PMHx/Surgical Hx: No surgical history  Work Hx: Full time student, part time OT  Living Situation: lives with spouse and baby  Pt Goals: decreased frequency, no loss of urine  Barriers: schedule  Motivation: good  Substance use: none  FABQ Score: 19  Cognition: A & O x 4        OBJECTIVE/EXAMINATION    Musculoskeletal Screen:   No focal LE weakness with GMMT  Reflexes WNL  LE sensation WNL  Trunk ROM is WNL  Able to squat and rise without difficulty    Pelvic Exam:   Moderate discomfort with palpation to BC at perineum  Palpable scar tissue, increased tissue turgor L vaginal opening/distal wall  Increased bladder neck and urethral mobility with valsalva  Multiple trigger points through LA musculature L > R.       PERFECT SCORE CHART  P =  Power (Laycock Scale Grade 0-5)  1/5  Trace contraction squeeze, no palpable lift. E =  Endurance (How long can pt hold max contraction)  NT  R =  Repetitions (How many times the pt can repeat the holds)  NT  F =  Fast Twitch (Number of 1 second contractions in 10 seconds)  NT  E =  Elevation (Lifting of post vag wall toward pubic bone) Present/Absent  Absent  C =  Co-Contraction of transverse abdominus Present/Absent  Absent  T = Timing (squeeze and lift with cough) Present/Absent Absent    With manual cues pt demonstrated difficulty identifying and bear PFM. Trace PFM contraction only, unable to complete PERFECT score. 15 min Therapeutic Exercise:  [x] See flow sheet :  PFM Awareness in supine, reverse trend[place]nburg   Rationale: increase strength, improve coordination and increase proprioception to improve the patients ability to initiate PFM contraction    15 min Therapeutic Activity:  []  See flow sheet :  FFM protection techniques including proper elimination posture and technique, avoiding valsalva. Instructed patient in perineal scar massage, provided written instruction.    Rationale: increase strength, improve coordination and increase proprioception  to improve the patients ability to decrease urinary incontinence. 15 min Manual Therapy:  Manual cues for PFM awareness exs. LA Trigger point release   Rationale: increase tissue extensibility and decrease trigger points  to improve the patients ability to initiate PFM contraction, decrease urinary incontinence. With   [x] TE   [x] TA   [] neuro   [] other: Patient Education: [x] Review HEP    [x] Progressed/Changed HEP based on:   [x] positioning   [] body mechanics   [] transfers   [] heat/ice application    [x] other: perineal scar massage, PFM protection techniques. Other Objective/Functional Measures:  *Pt 45 minutes late to appointment, need to complete evaluation. Check for LA avulsion next visit. Pain Level (0-10 scale) post treatment: 0/10      ASSESSMENT: Patient presents with signs and symptoms consistent with increased urinary frequency, urge incontinence post vaginal delivery with perineal tearing. She demonstrated considerable PFM weakness today with only trace contraction with manual and verbal cueing. Visit time was limited today, further assessment indicated. She will benefit from skilled PT services to address her limitations and achieve her functional goals as stated on the plan of care.         [x]  See Plan of Negro Lucas PT, MSPT   10/5/2017  10:50 AM

## 2017-10-11 ENCOUNTER — HOSPITAL ENCOUNTER (OUTPATIENT)
Dept: PHYSICAL THERAPY | Age: 32
Discharge: HOME | End: 2017-10-11
Payer: COMMERCIAL

## 2017-10-11 PROCEDURE — 97140 MANUAL THERAPY 1/> REGIONS: CPT | Performed by: PHYSICAL MEDICINE & REHABILITATION

## 2017-10-11 PROCEDURE — 97112 NEUROMUSCULAR REEDUCATION: CPT | Performed by: PHYSICAL MEDICINE & REHABILITATION

## 2017-10-11 PROCEDURE — 97110 THERAPEUTIC EXERCISES: CPT | Performed by: PHYSICAL MEDICINE & REHABILITATION

## 2017-10-11 NOTE — PROGRESS NOTES
[address] [facility]  [place], [address]  Phone: [phone]  Fax: [phone]    Plan of Care/ Statement of Necessity for Physical Therapy Services 2-15    Patient name: Rk Billigns  : 1985  Provider#: [id number]  Referral source: Bin Ly MD      Medical/Treatment Diagnosis: Other specified disorders of muscle [M62.89]     Prior Hospitalization: see medical history     Comorbidities: 3rd degree perineal tear, nursing  Prior Level of Function: no urine loss with ADLs  Medications: Verified on Patient Summary List    Start of Care: 10/5/17      Onset Date: 17 Delivery       The Plan of Care and following information is based on the information from the initial evaluation. Assessment/ key information: Patient is a 33year old female post partum 17, vaginal delivery with 3rd degree tear, baby had arm overhead on delivery. Currently nursing. She works part time, commutes to [employer] for full time classes, is struggling to tolerate the drive due to urgency, incontinence. She is having increased urgency, will leak urine when trying to get to toilet   She is not having loss of urine with cough/sneeze/laugh. Also having difficulty controlling gas. She does not feel fully empty when she urinates and will sometimes double void. She estimates she is urinating every 30 mins or more frequently. Patient presents with signs and symptoms consistent with increased urinary frequency, urge incontinence post vaginal delivery with perineal tearing. She demonstrated considerable PFM weakness today with only trace contraction with manual and verbal cueing. Visit time was limited today, further assessment indicated.   She will benefit from skilled PT services to address her limitations and achieve her functional goals as stated on the plan of care.         Evaluation Complexity History MEDIUM  Complexity : 1-2 comorbidities / personal factors will impact the outcome/ POC ; Examination MEDIUM Complexity : 3 Standardized tests and measures addressing body structure, function, activity limitation and / or participation in recreation  ;Presentation MEDIUM Complexity : Evolving with changing characteristics  ; Clinical Decision Making HIGH Complexity : FOTO score of 1- 25   Overall Complexity Rating: MEDIUM    Problem List: decrease strength, decrease ADL/ functional abilitiies, decrease activity tolerance and other urine loss with ADLs, incomplete voiding, high urinary frequency   Treatment Plan may include any combination of the following: Therapeutic exercise, Therapeutic activities, Neuromuscular re-education, Physical agent/modality, Manual therapy, Patient education, Self Care training and Functional mobility training  Patient / Family readiness to learn indicated by: asking questions and trying to perform skills  Persons(s) to be included in education: patient (P)  Barriers to Learning/Limitations: None  Patient Goal (s): normal bladder control, return to running for exercise  Patient Self Reported Health Status: excellent  Rehabilitation Potential: good    Short Term Goals: To be accomplished in 6 weeks:  Patient will be independent with a progressive home exercise program    Patient will demonstrate & utilized pelvic floor ms protection techniques   Patient will demonstrate improved PFM strength to 3/5 bilaterally in order to decrease UI symptoms   Patient will complete a 72 hour bladder diary    Patient will be independent with urge suppression techniques, bladder irritant elmination     Long Term Goals: To be accomplished in 12 weeks:  Patient will demonstrate 4/5 PFM strength bilaterally in order to eliminate UI symptoms. Patient will demonstrate 10 second PFM holds at 4/5 in order to elminate UI symptoms.    Patient will have no loss of urine with urge triggers (key in door, pulling pants down)  Patient will tolerate running 2 miles with no urine loss. Frequency / Duration: Patient to be seen 1-2 times per week for up to 12 weeks. Patient/ Caregiver education and instruction: self care, activity modification and exercises    Arabella Weaver, PT, MSPT 10/11/2017 11:09 AM    ________________________________________________________________________    I certify that the above Therapy Services are being furnished while the patient is under my care. I agree with the treatment plan and certify that this therapy is necessary.     [de-identified] Signature:____________________  Date:____________Time: _________

## 2017-10-11 NOTE — PROGRESS NOTES
PT DAILY TREATMENT NOTE 2-15    Patient Name: Rosales Alonso  Date:10/11/2017  : 1985  [x]  Patient  Verified  Payor: Karla Cardozo / Plan: Nisreen Amend / Product Type: PPO /    In time: 11:15  Out time: 12:05  Total Treatment Time (min): 50  Visit #: 2     Treatment Area: Other specified disorders of muscle [M62.89]    SUBJECTIVE  Pain Level (0-10 scale): 0/10  Any medication changes, allergies to medications, adverse drug reactions, diagnosis change, or new procedure performed?: [x] No    [] Yes (see summary sheet for update)  Subjective functional status/changes:   [] No changes reported  Has been attempting PFM exs at home. Feels she is able to initiate better than last visit. No change in urinary control yet. She has initated perineal scar massage at home with some relief. OBJECTIVE    Pelvic Exam:   Demonstrated mild improvement in levator ani activation compared to last visit. Palpable squeeze, no lift 2/5. Attempted to evaluate for LA avulsion, unable to determine due to lack of sufficient PFM contraction. Multiple trigger points though pelvic floor musculature at levator ani L > R, improved post treatment. 30 min Therapeutic Exercise:  [x] See flow sheet : PFM drills    Rationale: increase strength, improve coordination and increase proprioception to improve the patients ability to eliminate urine loss with ADLs    15 min Neuromuscular Re-education:  []  See flow sheet : sEMG biofeedback - vaginal with PFM drills   See sEMG biofeedback printout in chart. Rationale: increase strength, improve coordination and increase proprioception  to improve the patients ability to elminate urine loss with ADls    10 min Manual Therapy:  Levator ani trigger point release   Rationale: decrease pain, increase tissue extensibility and decrease trigger points  to improve the patients ability to improve PFM activation, decrease urine loss with ADLs.      With   [x] TE   [x] TA   [] neuro   [] other: Patient Education: [x] Review HEP    [] Progressed/Changed HEP based on:   [] positioning   [] body mechanics   [] transfers   [] heat/ice application    [] other:      Other Objective/Functional Measures:   10s hold average 16mV  Max effort 28.8mV     Pain Level (0-10 scale) post treatment: 0/10    ASSESSMENT/Changes in Function:     Patient will continue to benefit from skilled PT services to modify and progress therapeutic interventions, address ROM deficits, address strength deficits, analyze and address soft tissue restrictions, analyze and cue movement patterns and analyze and modify body mechanics/ergonomics to attain remaining goals. [x]  See Plan of Care  []  See progress note/recertification  []  See Discharge Summary         Progress towards goals / Updated goals:  Able to advance exercises today with good tolerance. Pt continues to need instruction for correct exercise form and performance. Continues to demonstrate good potential to achieve functional goals stated on the plan of care.       PLAN  [x]  Upgrade activities as tolerated     []  Continue plan of care  []  Update interventions per flow sheet       []  Discharge due to:_  []  Other:_      Presley Carter, PT, MSPT 10/11/2017  3:18 PM

## 2017-10-12 ENCOUNTER — PATIENT OUTREACH (OUTPATIENT)
Dept: OTHER | Age: 32
End: 2017-10-12

## 2017-10-12 ENCOUNTER — HOSPITAL ENCOUNTER (OUTPATIENT)
Dept: PHYSICAL THERAPY | Age: 32
Discharge: HOME | End: 2017-10-12
Payer: COMMERCIAL

## 2017-10-12 PROCEDURE — 97110 THERAPEUTIC EXERCISES: CPT | Performed by: PHYSICAL MEDICINE & REHABILITATION

## 2017-10-12 PROCEDURE — 97112 NEUROMUSCULAR REEDUCATION: CPT | Performed by: PHYSICAL MEDICINE & REHABILITATION

## 2017-10-12 NOTE — PROGRESS NOTES
PANKAJ f/u:  Telephone attempt to contact patient for transitions of care. Left discreet message on voicemail with this CC contact information. Will follow for one month for transitions of care needs. Next outreach is 10/18/17 for discussion about progress with finding a PCP.

## 2017-10-12 NOTE — PROGRESS NOTES
PF DAILY TREATMENT NOTE 3-16    Patient Name: Mary Jo Anderson  Date:10/12/2017  : 1985  [x]  Patient  Verified  Payor: Adam Campos / Plan: Dannielle Johnson / Product Type: PPO /    In time:1:00  Out time: 2:00  Total Treatment Time (min): 60  Visit #: 3    Treatment Area: [] Pelvic Floor     [] Other:    SUBJECTIVE  Pain Level (0-10 scale): 0/10  Any medication changes, allergies to medications, adverse drug reactions, diagnosis change, or new procedure performed?: [x] No    [] Yes (see summary sheet for update)  Subjective functional status/changes:   [x] No changes reported    OBJECTIVE    45 min Therapeutic Exercise:  [x] See flow sheet :   [x]  Pelvic floor strengthening                 []  Pelvic floor downtraining  [x]  Quality pelvic floor contractions       []  Relaxation techniques  []  Urge suppression exercises  []  Other:  Rationale: increase strength, improve coordination and increase proprioception  to improve the patients ability to eliminate urine loss with ADLs       15 min Neuromuscular Re-education:  []  See flow sheet : sEMG Biofeedback vaginal in supine hook lying   [x]  Pelvic floor strengthening                 []  Pelvic floor downtraining  [x]  Quality pelvic floor contractions       []  Relaxation techniques  []  Urge suppression exercises  []  Other:  Rationale: increase strength, improve coordination and increase proprioception  to improve the patients ability to eliminate urine loss with ADLs      With   [x] TE   [] TA   [x] neuro  [] manual   [] other: Patient Education: [x] Review HEP    [x] Progressed/Changed HEP based on:   [] positioning   [] body mechanics   [] transfers   [] heat/ice application    [] other:      Other Objective/Functional Measures:   [x]baseline resting tone: 5.76mV average  [x]slow twitch mms 16.5mV average 10s hold    Pain Level (0-10 scale) post treatment: 0    ASSESSMENT/Changes in Function: Continues to demonstrate substitution with gluts and abs     [x]  Decrease # of leaks   [x] No change []  Improving [] Resolved     []  Decrease hypertonus [] No change []  Improving [] Resolved     [x]  Increase void interval [x] No change []  Improving [] Resolved     [x]  Increase PF strength [] No change [x]  Improving [] Resolved     [x]  Increase PF endurance [] No change [x]  Improving [] Resolved     []  Increase endurance [] No change []  Improving [] Resolved     []  Decrease # of pads [] No change []  Improving [] Resolved     []  Decrease pain [] No change []  Improving [] Resolved     [x]  Increased coordination [] No change [x]  Improving [] Resolved     []  Increased Bowel Frequency [] No change []  Improving [] Resolved       Patient will continue to benefit from skilled PT services to modify and progress therapeutic interventions, address functional mobility deficits, address ROM deficits, address strength deficits, analyze and address soft tissue restrictions and analyze and modify body mechanics/ergonomics to attain remaining goals. [x]  See Plan of Care  []  See progress note/recertification  []  See Discharge Summary         Progress towards goals / Updated goals:  Able to advance exercises today with good tolerance. Pt continues to need instruction for correct exercise form and performance. Continues to demonstrate good potential to achieve functional goals stated on the plan of care.       PLAN  [x]  Upgrade activities as tolerated     []  Continue plan of care  []  Update interventions per flow sheet       []  Discharge due to:_  []  Other:_      Joede Coburn, PT, MSPT 10/12/2017  1:28 PM

## 2017-10-18 ENCOUNTER — PATIENT OUTREACH (OUTPATIENT)
Dept: OTHER | Age: 32
End: 2017-10-18

## 2017-10-18 ENCOUNTER — HOSPITAL ENCOUNTER (OUTPATIENT)
Dept: PHYSICAL THERAPY | Age: 32
Discharge: HOME | End: 2017-10-18
Payer: COMMERCIAL

## 2017-10-18 PROCEDURE — 97110 THERAPEUTIC EXERCISES: CPT | Performed by: PHYSICAL MEDICINE & REHABILITATION

## 2017-10-18 PROCEDURE — 97112 NEUROMUSCULAR REEDUCATION: CPT | Performed by: PHYSICAL MEDICINE & REHABILITATION

## 2017-10-18 NOTE — PROGRESS NOTES
PANKAJ F/U  Second telephone f/u outreach attempted. Left discreet voicemail with this CC confidential contact information. Will send UTR letter.

## 2017-10-18 NOTE — PROGRESS NOTES
PF DAILY TREATMENT NOTE 3-16    Patient Name: Saul Marking  Date:10/18/2017  : 1985  [x]  Patient  Verified  Payor: Ruth Siegel / Plan: Eddie He / Product Type: PPO /    In time: 1:15  Out time: 2:05  Total Treatment Time (min): 45  Visit #: 4     Treatment Area: [x] Pelvic Floor     [] Other:    SUBJECTIVE  Pain Level (0-10 scale):  0/10  Any medication changes, allergies to medications, adverse drug reactions, diagnosis change, or new procedure performed?: [x] No    [] Yes (see summary sheet for update)  Subjective functional status/changes:   [] No changes reported      OBJECTIVE      30 min Therapeutic Exercise:  [x] See flow sheet :   [x]  Pelvic floor strengthening                 []  Pelvic floor downtraining  [x]  Quality pelvic floor contractions       []  Relaxation techniques  []  Urge suppression exercises  []  Other:  Rationale: increase strength, improve coordination and increase proprioception  to improve the patients ability to decrease UI with ADLs       15 min Neuromuscular Re-education:  [x]  See flow sheet : sEMG biofeedback   [x]  Pelvic floor strengthening                 []  Pelvic floor downtraining  [x]  Quality pelvic floor contractions       []  Relaxation techniques  []  Urge suppression exercises  []  Other:  Rationale: increase strength, improve coordination and increase proprioception  to improve the patients ability to decrease UI with ADLs          With   [x] TE   [] TA   [x] neuro  [] manual   [] other: Patient Education: [x] Review HEP    [x] Progressed/Changed HEP based on:   [x] positioning   [] body mechanics   [] transfers   [] heat/ice application    [] other:      Other Objective/Functional Measures: --    Pain Level (0-10 scale) post treatment: 0/10    ASSESSMENT/Changes in Function:     [x]  Decrease # of leaks   [] No change [x]  Improving [] Resolved     []  Decrease hypertonus [] No change []  Improving [] Resolved     [x]  Increase void interval [] No change [x]  Improving [] Resolved     [x]  Increase PF strength [] No change [x]  Improving [] Resolved     []  Increase PF endurance [] No change []  Improving [] Resolved     []  Increase endurance [] No change []  Improving [] Resolved     []  Decrease # of pads [] No change []  Improving [] Resolved     []  Decrease pain [] No change []  Improving [] Resolved     []  Increased coordination [] No change []  Improving [] Resolved     []  Increased Bowel Frequency [] No change []  Improving [] Resolved       Patient will continue to benefit from skilled PT services to modify and progress therapeutic interventions, address strength deficits, analyze and cue movement patterns, analyze and modify body mechanics/ergonomics and assess and modify postural abnormalities to attain remaining goals. [x]  See Plan of Care  []  See progress note/recertification  []  See Discharge Summary         Progress towards goals / Updated goals:  Slow steady gains toward goals.       PLAN  [x]  Upgrade activities as tolerated     []  Continue plan of care  []  Update interventions per flow sheet       []  Discharge due to:_  []  Other:_      Julian De Luna PT, MSPT 10/18/2017  1:41 PM

## 2017-10-18 NOTE — LETTER
10/18/2017 4:39 PM 
 
Ms. Candice Hinton [address] 
[address] [facility] Dear Candice Hinton My name is Allyssa Peñaloza, Employee Care Coordinator for [facility] and I have been trying to reach you. The Employee Care Management [facility]) program is a free-of-charge confidential service provided to our employees and their family members covered by the [facility]. The program will provide an employee and his/her family with the [facility] expertise to assist in navigating the health care delivery system, provider services, and their overall care needsso as to assure and improve health care interactions and enhance the quality of life. This program is designed to provide you with the opportunity to have a [facility] care manager partner with you for the following services: 
 
 1) when you come home from the hospital or emergency room 2) when help is needed to manage your disease 3) when you need assistance coordinating services or appointments [facility] now partners with [facility]. If you are a qualifying employee, you may receive an additional 10 wellness incentive points for every month of active participation with an Employee Care Manager. [facility] is dedicated to empowering the good health of its community and improving the quality of care and care experiences for employees and their families. We are committed to safeguarding patient confidentiality and privacy, assuring that every employee has the respect he or she deserves in managing their health. The information shared with your care manager will not be shared with anyone else aside from those you identify as part of your care team, and will only be used to assist you with any identified care needs. Please contact me if you would like this service provided to you. Sincerely, 
 
Justen Garsia LPN  [facility] Care Coordinator [address] [facility], [address] [facility] [address] [phone]  F [phone]

## 2017-10-19 ENCOUNTER — HOSPITAL ENCOUNTER (OUTPATIENT)
Dept: PHYSICAL THERAPY | Age: 32
Discharge: HOME | End: 2017-10-19
Payer: COMMERCIAL

## 2017-10-19 PROCEDURE — 97112 NEUROMUSCULAR REEDUCATION: CPT | Performed by: PHYSICAL MEDICINE & REHABILITATION

## 2017-10-19 PROCEDURE — 97110 THERAPEUTIC EXERCISES: CPT | Performed by: PHYSICAL MEDICINE & REHABILITATION

## 2017-10-19 NOTE — PROGRESS NOTES
PF DAILY TREATMENT NOTE 3-16    Patient Name: Yadira Beltran  Date:10/19/2017  : 1985  [x]  Patient  Verified  Payor: Michael Andres / Plan: Dave Mathewsivers / Product Type: PPO /    In time: 1:10  Out time: 2;05  Total Treatment Time (min): 55  Visit #: 5    Treatment Area: [x] Pelvic Floor     [x] Other: Diastasis Recti - Core stability     SUBJECTIVE  Pain Level (0-10 scale): 0/10  Any medication changes, allergies to medications, adverse drug reactions, diagnosis change, or new procedure performed?: [x] No    [] Yes (see summary sheet for update)  Subjective functional status/changes:   [] No changes reported  She is noticing she can wait longer between visits to the bathroom and she has better success making it to the bathroom. Anxious to start core stability work. OBJECTIVE      45 min Therapeutic Exercise:  [] See flow sheet :  TrAB and core strengthening exercises per flow sheet    TrAB with PFMT   Practiced urge suppression techniques. [x]  Pelvic floor strengthening                 []  Pelvic floor downtraining  [x]  Quality pelvic floor contractions       []  Relaxation techniques  []  Urge suppression exercises  []  Other:  Rationale: increase strength, improve coordination and increase proprioception  to improve the patients ability to eliminate urge incontinence       15 min Neuromuscular Re-education:  [x]  See flow sheet :sEMG biofeedback with vaginal sensor.     [x]  Pelvic floor strengthening                 []  Pelvic floor downtraining  [x]  Quality pelvic floor contractions       []  Relaxation techniques  []  Urge suppression exercises  []  Other:  Rationale: increase strength, improve coordination and increase proprioception  to improve the patients ability to elminate urge incontinence      With   [x] TE   [] TA   [x] neuro  [] manual   [] other: Patient Education: [x] Review HEP    [] Progressed/Changed HEP based on:   [] positioning   [] body mechanics [] transfers   [] heat/ice application    [] other:      Other Objective/Functional Measures:   [x]baseline resting tone: 6.5mV  [x]slow twitch mms  19.2mV 10s holds  [x]fast twitch mms  10.9mV    Pain Level (0-10 scale) post treatment: 0/10    ASSESSMENT/Changes in Function:     [x]  Decrease # of leaks   [] No change [x]  Improving [] Resolved     []  Decrease hypertonus [] No change []  Improving [] Resolved     [x]  Increase void interval [] No change [x]  Improving [] Resolved     [x]  Increase PF strength [] No change [x]  Improving [] Resolved     [x]  Increase PF endurance [] No change []  Improving [] Resolved     []  Increase endurance [] No change []  Improving [] Resolved     []  Decrease # of pads [] No change []  Improving [] Resolved     []  Decrease pain [] No change []  Improving [] Resolved     []  Increased coordination [] No change []  Improving [] Resolved     []  Increased Bowel Frequency [] No change []  Improving [] Resolved       Patient will continue to benefit from skilled PT services to modify and progress therapeutic interventions, address strength deficits, analyze and address soft tissue restrictions, analyze and cue movement patterns, analyze and modify body mechanics/ergonomics and assess and modify postural abnormalities to attain remaining goals. [x]  See Plan of Care  []  See progress note/recertification  []  See Discharge Summary         Progress towards goals / Updated goals:  Able to advance exercises today with good tolerance. Pt continues to need instruction for correct exercise form and performance. Continues to demonstrate good potential to achieve functional goals stated on the plan of care.       PLAN  [x]  Upgrade activities as tolerated     []  Continue plan of care  []  Update interventions per flow sheet       []  Discharge due to:_  []  Other:_      Len Morel, PT, MSPT 10/19/2017  1:17 PM

## 2017-10-23 ENCOUNTER — OFFICE VISIT (OUTPATIENT)
Dept: DERMATOLOGY | Facility: AMBULATORY SURGERY CENTER | Age: 32
End: 2017-10-23

## 2017-10-23 DIAGNOSIS — D23.9 DERMATOFIBROMA: ICD-10-CM

## 2017-10-23 DIAGNOSIS — D22.9 MULTIPLE BENIGN NEVI: Primary | ICD-10-CM

## 2017-10-23 NOTE — PROGRESS NOTES
Name: Angel Verdin       Age: 33 y.o. Date: 10/23/2017    Chief Complaint:   Chief Complaint   Patient presents with    Skin Exam       Subjective:    HPI:  Ms.. Angel Verdin is a 33 y.o. female who presents for the evaluation of a lesion on the right distal upper arm and posterior scalp. The patient was referred by herself for this evaluation. She states that the lesion on the arm appeared recently and the scalp lesion is more long standing but she believes this changed during a recent pregnancy. The patient has not had prior treatment for these lesions. Associated symptoms include none. She would also like 3 moles checked on the left neck. No associated symptoms and long standing nevi. ROS: Consitutional: Negative  Dermatological : positive for - mole changes and skin lesion changes      Social History     Social History    Marital status:      Spouse name: N/A    Number of children: N/A    Years of education: N/A     Occupational History    Not on file.      Social History Main Topics    Smoking status: Never Smoker    Smokeless tobacco: Never Used      Comment: Never used vapor or e-cigs     Alcohol use No    Drug use: No    Sexual activity: Not Currently     Partners: Male     Birth control/ protection: None     Other Topics Concern    Not on file     Social History Narrative       Family History   Problem Relation Age of Onset    Thyroid Disease Mother     Cancer Father      Prostate     Thyroid Disease Maternal Grandmother     Thyroid Disease Other      6 Siblings of Mother h/o Hyperthyroidism        Past Medical History:   Diagnosis Date    Anemia     Routine Papanicolaou smear 07/20/2016    Negative (no hpv) from [facility]    Sunburn, blistering        Past Surgical History:   Procedure Laterality Date    HX CHOLECYSTECTOMY  07/2010    HX WISDOM TEETH EXTRACTION  2009       Current Outpatient Prescriptions   Medication Sig Dispense Refill    IRON, FERROUS SULFATE, PO Take  by mouth.  AMBULATORY BREAST PUMP Double electric breast pump, dual setup  Z39.1  CHESTER=7/7/17 1 Units 0    prenatal multivit-ca-min-fe-fa tab Take  by mouth.  acetaminophen (TYLENOL) 325 mg tablet Take  by mouth every four (4) hours as needed for Pain.  oxyCODONE-acetaminophen (PERCOCET) 5-325 mg per tablet Take 1-2 Tabs by mouth every four (4) hours as needed for Pain. Max Daily Amount: 12 Tabs. 10 Tab 0       Allergies   Allergen Reactions    Amoxicillin Rash         Objective:    Visit Vitals    Pulse (P) 68    Temp (P) 97.8 °F (36.6 °C) (Oral)    Resp (P) 18    Ht (P) 5' 3\" (1.6 m)    Wt (P) 49.9 kg (110 lb)    SpO2 (P) 99%    BMI (P) 19.49 kg/m2       Kaiser Wolfe is a 33 y.o. female who appears well and in no distress. She is awake, alert, and oriented. There is no preauricular, submandibular, or cervical lymphadenopathy. A limited skin examination was completed including the posterior (occipital) scalp, right AC area and left lateral neck. There is a pink scalp nevus without concerning features. There is a hypopigmented firm papule of 3 mm on the right distal upper arm at the [facility] by [facility] area. This is most consistent with a dermatofibroma. There are 3 pink and brown intradermal nevi on the left neck. One with hyperpigmented surface, no concerning features in these nevi. Assessment/Plan:  1. Normal nevi. The diagnosis of normal nevi was reviewed. I discussed sun protection, sunscreen use, the warning signs of skin cancer, the need for self-skin examinations, and the need for regular practitioner exams every few years. The patient should follow up sooner as needed if new, changing, or symptomatic skin lesions arise. 2. Dermatofibroma. The diagnosis was reviewed and the patient was reassured that no treatment is needed for these benign conditions at this time. The patient should follow up should the lesion change or become symptomatic.

## 2017-10-26 ENCOUNTER — PATIENT OUTREACH (OUTPATIENT)
Dept: OTHER | Age: 32
End: 2017-10-26

## 2017-10-26 ENCOUNTER — HOSPITAL ENCOUNTER (OUTPATIENT)
Dept: PHYSICAL THERAPY | Age: 32
Discharge: HOME | End: 2017-10-26
Payer: COMMERCIAL

## 2017-10-26 PROCEDURE — 97112 NEUROMUSCULAR REEDUCATION: CPT | Performed by: PHYSICAL MEDICINE & REHABILITATION

## 2017-10-26 PROCEDURE — 97110 THERAPEUTIC EXERCISES: CPT | Performed by: PHYSICAL MEDICINE & REHABILITATION

## 2017-10-26 NOTE — PROGRESS NOTES
PT DAILY TREATMENT NOTE 2-15    Patient Name: Kathe Valdez  Date:10/26/2017  : 1985  [x]  Patient  Verified  Payor: Canones Dose / Plan: Matteo Ji / Product Type: PPO /    In time: 1:00  Out time: 2:00  Total Treatment Time (min): 60  Visit #: 6     Treatment Area: Other specified disorders of muscle [M62.89]    SUBJECTIVE  Pain Level (0-10 scale): 0/10  Any medication changes, allergies to medications, adverse drug reactions, diagnosis change, or new procedure performed?: [x] No    [] Yes (see summary sheet for update)  Subjective functional status/changes:   [] No changes reported  Notes improvement in her ability to make it to the bathroom with less leaks. She has been keeping up with her core stability exercises daily, feels stronger. OBJECTIVE      45 min Therapeutic Exercise:  [x] See flow sheet : Supine, sitting and standing. Rationale: increase strength, improve coordination and increase proprioception to improve the patients ability to eliminate urine loss with ADLs     15 min Neuromuscular Re-education:  []  See flow sheet : sEMG biofeedback     Rationale: increase strength, improve coordination and increase proprioception  to improve the patients ability to eliminate urine loss with ADLs.            With   [x] TE   [] TA   [x] neuro   [] other: Patient Education: [x] Review HEP    [x] Progressed/Changed HEP based on:   [] positioning   [] body mechanics   [] transfers   [] heat/ice application    [] other:      Other Objective/Functional Measures:      Pain Level (0-10 scale) post treatment: 0/10    ASSESSMENT/Changes in Function:     Patient will continue to benefit from skilled PT services to modify and progress therapeutic interventions, address functional mobility deficits, address ROM deficits, address strength deficits, analyze and address soft tissue restrictions, analyze and cue movement patterns and analyze and modify body mechanics/ergonomics to attain remaining goals. [x]  See Plan of Care  []  See progress note/recertification  []  See Discharge Summary         Progress towards goals / Updated goals:  Able to advance exercises today with good tolerance. Pt continues to need instruction for correct exercise form and performance. Continues to demonstrate good potential to achieve functional goals stated on the plan of care.       PLAN  [x]  Upgrade activities as tolerated     []  Continue plan of care  []  Update interventions per flow sheet       []  Discharge due to:_  []  Other:_      Kenton Traylor PT, MSPT 10/26/2017  1:20 PM

## 2017-10-26 NOTE — LETTER
10/26/2017 12:42 PM 
 
Ms. Candelario Blanc [address] 
[address] [facility] Dear Candelario Blanc My name is Althea Lundberg, Employee Care Coordinator for [employer], and I have been trying to reach you. The Employee Care Management [facility]) program is a free-of-charge, confidential service provided to our employees and their family members covered by the [facility]. I can help you with care transitions such as when you come home from the hospital, when help is needed to manage your disease, or when you need assistance coordinating services or appointments. [facility] now partners with [facility]. If you are a qualifying employee, you may receive an additional 10 wellness incentive points for every month of active participation with an Employee Care Manager. As healthcare providers, we know that patients do better when they have close follow up with a primary care provider (PCP). I can help you find one that is convenient to you and covered by your insurance. I can also help you understand any after visit instructions, such as what symptoms to watch out for, or any new or changed medications. We can work together using your preferred communication -- telephone, email, [employer]. If you do not have a [employer] account, I can help you request access. Our program is designed to provide you with the opportunity to have a [employer] care manager partner with you for your healthcare needs. Due to not being able to reach you, I am closing out the current program, but will remain available to you should you have any questions. Please contact me at the below number if I can provide you with assistance for any of the above services. Sincerely, 
 
John Jones LPN  [place] [facility] Care Coordinator [address] [facility] [phone]   [phone]

## 2017-10-26 NOTE — PROGRESS NOTES
Resolving current episode for ALMA RODRIGUEZ case management due to patient lost to follow up. Patient has not been reached after repeated calls and letters. Final call made today to attempt to contact and discreet message left on voicemail. Letter sent to patient notifying completion of services due to unable to reach. This writer's contact information and information regarding program services included in materials sent. Will remain available should patient request re-initiation of case management or transitions of care services.

## 2017-11-02 ENCOUNTER — HOSPITAL ENCOUNTER (OUTPATIENT)
Dept: PHYSICAL THERAPY | Age: 32
Discharge: HOME | End: 2017-11-02
Payer: COMMERCIAL

## 2017-11-02 PROCEDURE — 97032 APPL MODALITY 1+ESTIM EA 15: CPT | Performed by: PHYSICAL MEDICINE & REHABILITATION

## 2017-11-02 PROCEDURE — 97110 THERAPEUTIC EXERCISES: CPT | Performed by: PHYSICAL MEDICINE & REHABILITATION

## 2017-11-02 NOTE — PROGRESS NOTES
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 She feels more confident making it to the bathroom.       OBJECTIVE    Modality rationale: increase muscle contraction/control to improve the patients ability to decrease urine loss with ADLs   Min Type Additional Details   15 [x] Estim: []Att   []Unatt        []TENS instruct                  []IFC  []Premod   [x]NMES                     []Other:  []w/US   []w/ice   []w/heat  Position: supine hook lying  Location: vaginal     []  Traction: [] Cervical       []Lumbar                       [] Prone          []Supine                       []Intermittent   []Continuous Lbs:  [] before manual  [] after manual  []w/heat    []  Ultrasound: []Continuous   [] Pulsed at:                            []1MHz   []3MHz Location:  W/cm2:    []  Paraffin         Location:  []w/heat    []  Ice     []  Heat  []  Ice massage Position:  Location:    []  Laser  []  Other: Position:  Location:    []  Vasopneumatic Device Pressure:       [] lo [] med [] hi   Temperature:    [x] Skin assessment post-treatment:  [x]intact []redness- no adverse reaction    []redness  adverse reaction:     45 min Therapeutic Exercise:  [x] See flow sheet :   Rationale: increase strength, improve coordination and increase proprioception to improve the patients ability to eliminate urine loss with ADLs          With   [x] TE   [] TA   [] neuro   [] other: Patient Education: [x] Review HEP    [] Progressed/Changed HEP based on:   [] positioning   [] body mechanics   [] transfers   [] heat/ice application    [] other:      Other Objective/Functional Measures: ***     Pain Level (0-10 scale) post treatment: ***    ASSESSMENT/Changes in Function:     Patient will continue to benefit from skilled PT services to {[facility] INMOTION ASSESSMENT STATEMENTS:99242} to attain remaining goals.      []  See Plan of Care  []  See progress note/recertification  []  See Discharge Summary         Progress towards goals / Updated goals:  ***    PLAN  []  Upgrade activities as tolerated     []  Continue plan of care  []  Update interventions per flow sheet       []  Discharge due to:_  []  Other:_      Shona Fu, PT 11/2/2017  1:22 PM

## 2017-11-09 ENCOUNTER — HOSPITAL ENCOUNTER (OUTPATIENT)
Dept: PHYSICAL THERAPY | Age: 32
Discharge: HOME | End: 2017-11-09
Payer: COMMERCIAL

## 2017-11-09 ENCOUNTER — APPOINTMENT (OUTPATIENT)
Dept: PHYSICAL THERAPY | Age: 32
End: 2017-11-09

## 2017-11-09 PROCEDURE — 97110 THERAPEUTIC EXERCISES: CPT | Performed by: PHYSICAL MEDICINE & REHABILITATION

## 2017-11-09 PROCEDURE — 97032 APPL MODALITY 1+ESTIM EA 15: CPT | Performed by: PHYSICAL MEDICINE & REHABILITATION

## 2017-11-09 NOTE — PROGRESS NOTES
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      OBJECTIVE    Modality rationale: increase muscle contraction/control to improve the patients ability to eliminate urinary urge, dyspareunia   Min Type Additional Details   15 [] Estim: []Att   []Unatt        []TENS instruct                  []IFC  []Premod   [x]NMES                     []Other:  []w/US   []w/ice   []w/heat  Position: supine  Location: vaginal/levator ani    []  Traction: [] Cervical       []Lumbar                       [] Prone          []Supine                       []Intermittent   []Continuous Lbs:  [] before manual  [] after manual  []w/heat    []  Ultrasound: []Continuous   [] Pulsed at:                            []1MHz   []3MHz Location:  W/cm2:    []  Paraffin         Location:  []w/heat    []  Ice     []  Heat  []  Ice massage Position:  Location:    []  Laser  []  Other: Position:  Location:    []  Vasopneumatic Device Pressure:       [] lo [] med [] hi   Temperature:    [x] Skin assessment post-treatment:  [x]intact []redness- no adverse reaction    []redness  adverse reaction:     45 min Therapeutic Exercise:  [x] See flow sheet :  PFMT    Rationale: increase ROM, improve coordination and increase proprioception to improve the patients ability to eliminate urine loss with ADls      With   [x] TE   [] TA   [] neuro   [] other: Patient Education: [x] Review HEP    [] Progressed/Changed HEP based on:   [] positioning   [] body mechanics   [] transfers   [] heat/ice application    [x] other:  Home NMES unit - pt to order this week and bring in for setting adjustments. Other Objective/Functional Measures: Able to increase intensity of NMES with improved PFM contraction noted. Pain Level (0-10 scale) post treatment: 0/10    ASSESSMENT/Changes in Function:     Patient will continue to benefit from skilled PT services to modify and progress therapeutic interventions, address strength deficits, analyze and address soft tissue restrictions and analyze and modify body mechanics/ergonomics to attain remaining goals. [x]  See Plan of Care  []  See progress note/recertification  []  See Discharge Summary         Progress towards goals / Updated goals:  Able to advance exercises today with good tolerance. Pt continues to need instruction for correct exercise form and performance. Continues to demonstrate good potential to achieve functional goals stated on the plan of care.       PLAN  []  Upgrade activities as tolerated     [x]  Continue plan of care  []  Update interventions per flow sheet       []  Discharge due to:_  [x]  Other: set up home NMES      Donna Harman PT, MSPT 11/9/2017  3:27 PM

## 2017-11-15 ENCOUNTER — HOSPITAL ENCOUNTER (OUTPATIENT)
Dept: PHYSICAL THERAPY | Age: 32
Discharge: HOME | End: 2017-11-15
Payer: COMMERCIAL

## 2017-11-15 PROCEDURE — 97112 NEUROMUSCULAR REEDUCATION: CPT | Performed by: PHYSICAL MEDICINE & REHABILITATION

## 2017-11-15 PROCEDURE — 97110 THERAPEUTIC EXERCISES: CPT | Performed by: PHYSICAL MEDICINE & REHABILITATION

## 2017-11-15 PROCEDURE — 97032 APPL MODALITY 1+ESTIM EA 15: CPT | Performed by: PHYSICAL MEDICINE & REHABILITATION

## 2017-11-29 ENCOUNTER — HOSPITAL ENCOUNTER (OUTPATIENT)
Dept: PHYSICAL THERAPY | Age: 32
Discharge: HOME | End: 2017-11-29
Payer: COMMERCIAL

## 2017-11-29 PROCEDURE — 97530 THERAPEUTIC ACTIVITIES: CPT | Performed by: PHYSICAL MEDICINE & REHABILITATION

## 2017-11-29 PROCEDURE — 97110 THERAPEUTIC EXERCISES: CPT | Performed by: PHYSICAL MEDICINE & REHABILITATION

## 2017-11-29 NOTE — PROGRESS NOTES
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 Will test later this week. OBJECTIVE    45 min Therapeutic Exercise:  [x] See flow sheet :   Rationale: increase strength, improve coordination and increase proprioception to improve the patients ability to decrease UI, frequency with ADLs    15 min Therapeutic Activity:  []  See flow sheet : Urge suppression   Rationale: improve coordination and increase proprioception  to improve the patients ability to decrease UI, frequency with ADLs. With   [x] TE   [x] TA   [] neuro   [] other: Patient Education: [x] Review HEP    [] Progressed/Changed HEP based on:   [x] positioning   [x] body mechanics   [] transfers   [] heat/ice application    [] other:      Other Objective/Functional Measures: --     Pain Level (0-10 scale) post treatment: 0/10    ASSESSMENT/Changes in Function:  Internal exam, internal work deferred today due to suspected early pregnancy. Patient will continue to benefit from skilled PT services to modify and progress therapeutic interventions, address strength deficits, analyze and modify body mechanics/ergonomics and assess and modify postural abnormalities to attain remaining goals.      [x]  See Plan of Care  []  See progress note/recertification  []  See Discharge Summary         Progress towards goals / Updated goals:  Able to advance exercises today with good tolerance. Pt continues to need instruction for correct exercise form and performance. Continues to demonstrate good potential to achieve functional goals stated on the plan of care.       PLAN  [x]  Upgrade activities as tolerated     []  Continue plan of care  []  Update interventions per flow sheet       []  Discharge due to:_  []  Other:_      Sondra Valdez PT, MSPT 11/29/2017  2:25 PM

## 2018-01-02 ENCOUNTER — TELEPHONE (OUTPATIENT)
Dept: OBGYN CLINIC | Age: 33
End: 2018-01-02

## 2018-01-02 DIAGNOSIS — Z34.90 PREGNANCY, UNSPECIFIED GESTATIONAL AGE: Primary | ICD-10-CM

## 2018-01-02 NOTE — TELEPHONE ENCOUNTER
Spoke with patient and advised of Dr. Aileen Heard advice. She will check urine upt in a few days and call when she gets home. Advised will need to come in for blood work and we discussed did not need appt and hours.

## 2018-01-02 NOTE — TELEPHONE ENCOUNTER
Very possible that she is truly early pregnant. Can rpt UPT in next few days. Once she is back in town, can come in for lab appt for Kwaku Molina if she would like.

## 2018-01-02 NOTE — TELEPHONE ENCOUNTER
Patient is 33 y.o, , DM patient. She is not currently using any form of birthcontrol and is breastfeeding. She states that she had a moderately heavy cycle 17 that lasted about 4 days. She had been testing at home for pregnancy and was negative. She is now testing positive (UPT positive 18). She is questioning if hormonal or truly pregnant. She is out of town for holidays in [place] currently. Should she make appointment for labs when she comes back or retest at home at a particular date? Please advise.

## 2018-01-05 ENCOUNTER — TELEPHONE (OUTPATIENT)
Dept: OBGYN CLINIC | Age: 33
End: 2018-01-05

## 2018-01-05 NOTE — TELEPHONE ENCOUNTER
Encounter opened to order Isreal Centeno that patient would like to have drawn across [place] at [facility] 01/05/18 at 1 pm today. Copy of order made and placed at  for her to  and take with her.

## 2018-01-06 LAB — HCG INTACT+B SERPL-ACNC: NORMAL MIU/ML

## 2018-01-08 ENCOUNTER — TELEPHONE (OUTPATIENT)
Dept: OBGYN CLINIC | Age: 33
End: 2018-01-08

## 2018-01-08 NOTE — TELEPHONE ENCOUNTER
Call received at 9:38am      33year old patient last seen in the office on 8//23/17 for postpartum visit. Patient had beta hcg drawn on 1/5/17. Please advised regarding results.

## 2018-01-08 NOTE — TELEPHONE ENCOUNTER
Patient advised of MD recommendations. Patient is currently breast feeding her child born 7/5/17. Patient is wondering how this effects the developing fetus.       Please advise

## 2018-01-09 NOTE — TELEPHONE ENCOUNTER
Patient advised of MD recommendations and will discuss in further detail at her first ob appointment. Patient verbalized understanding.

## 2018-01-10 NOTE — PROGRESS NOTES
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 She was seen for 10 visits. Treatment consisted of manual therapy, therapeutic exercise, therapeutic activity, bladder health education, bowel habit education, biofeedback assisted pelvic floor training, home exercise program development and progression. She made slow gains with PT efforts, specifically struggling with the ability to identify and recruit the levator ani musculature bilaterally. She was recently put on home vaginal NMES to help recruit her pelvic floor musculature and this was having some benefit, she terminated this after suspecting she was pregnant 17. If she is pregnant it is recommended she stop NMES, she is aware of this. She has self discharged PT at this point due to her schedule and suspected pregnancy.     Short Term Goals: To be accomplished in 6 weeks:  Patient will be independent with a progressive home exercise program  MET  Patient will demonstrate & utilized pelvic floor ms protection techniques   MET  Patient will demonstrate improved PFM strength to 3/5 bilaterally in order to decrease UI symptoms NOT MET  Patient will complete a 72 hour bladder diary  NOT MET  Patient will be independent with urge suppression techniques, bladder irritant elmination MET     Long Term Goals: To be accomplished in 12 weeks:  Patient will demonstrate 4/5 PFM strength bilaterally in order to eliminate UI symptoms. NOT MET  Patient will demonstrate 10 second PFM holds at 4/5 in order to elminate UI symptoms. NOT MET  Patient will have no loss of urine with urge triggers (key in door, pulling pants down)MET  Patient will tolerate running 2 miles with no urine loss.  NOT ASSESSED    RECOMMENDATIONS:  [x]Discontinue therapy: []Patient has reached or is progressing toward set goals      []Patient is non-compliant or has abdicated      []Due to lack of appreciable progress towards set goals      [x]Other:  Patient schedule conflict     Gorge Longo PT, MSPT    1/10/2018 10:33 AM

## 2018-01-11 ENCOUNTER — TELEPHONE (OUTPATIENT)
Dept: OBGYN CLINIC | Age: 33
End: 2018-01-11

## 2018-01-11 ENCOUNTER — OFFICE VISIT (OUTPATIENT)
Dept: OBGYN CLINIC | Age: 33
End: 2018-01-11

## 2018-01-11 VITALS
HEIGHT: 63 IN | BODY MASS INDEX: 19.49 KG/M2 | HEART RATE: 66 BPM | SYSTOLIC BLOOD PRESSURE: 95 MMHG | WEIGHT: 110 LBS | DIASTOLIC BLOOD PRESSURE: 56 MMHG

## 2018-01-11 DIAGNOSIS — O20.0 THREATENED ABORTION: Primary | ICD-10-CM

## 2018-01-11 DIAGNOSIS — Z32.01 POSITIVE PREGNANCY TEST: ICD-10-CM

## 2018-01-11 LAB
CHLAMYDIA, EXTERNAL: NEGATIVE
N. GONORRHEA, EXTERNAL: NEGATIVE

## 2018-01-11 NOTE — PROGRESS NOTES
Threatened AB note    Federico Samaritan is a ,  33 y.o. female [facility] Patient's last menstrual period was 10/30/2017. making her 10 weeks pregnant. She has not had prenatal care. Was still nursing, just stopped. Thinks conception was end of November. Had heavy bleeding . Heavy bleeding lasted 4d, then tapered off. Neg UPT at that time. Some spotting last couple of days. No other vaginal discharge, itching, odor, irritation. She had a positive pregnancy test. She has had a recent pelvic ultrasound that showed:  TA ULTRASOUND PERFORMED  A SINGLE VIABLE 7W2D IUP IS SEEN WITH NORMAL CARDIAC RHYTHM OBSERVED. A FUNDAL AND RIGHT  SUBCHORONIC HEMORRHAGE WAS SEEN. S<D, A F/U ULTRASOUND WOULD BE HELPFUL. GESTATIONAL AGE BASED ON TODAYS ULTRASOUND. A NORMAL YOLK SAC IS SEEN. RIGHT OVARY APPEARS WITHIN NORMAL LIMITS. LEFT OVARY APPEARS WITHIN NORMAL LIMITS. NO FREE FLUID SEEN IN THE CDS. Her past medical history is not significant for risk factors for ectopic pregnancy. She does not have a history of a spontaneous . She has not had a recent injury or trauma. Additional complaints: some nausea, tolerable. Past Medical History:   Diagnosis Date    Anemia     Routine Papanicolaou smear 2016    Negative (no hpv) from [facility]    Sunburn, blistering      Past Surgical History:   Procedure Laterality Date    HX CHOLECYSTECTOMY  2010    HX WISDOM TEETH EXTRACTION       Social History     Occupational History    Not on file.      Social History Main Topics    Smoking status: Never Smoker    Smokeless tobacco: Never Used      Comment: Never used vapor or e-cigs  Alcohol use No    Drug use: No    Sexual activity: Yes     Partners: Male     Birth control/ protection: None     Family History   Problem Relation Age of Onset    Thyroid Disease Mother     Cancer Father      Prostate     Thyroid Disease Maternal Grandmother     Thyroid Disease Other      6 Siblings of Mother h/o Hyperthyroidism        Allergies   Allergen Reactions    Amoxicillin Rash     Prior to Admission medications    Medication Sig Start Date End Date Taking? Authorizing Provider   IRON, FERROUS SULFATE, PO Take  by mouth. Yes Historical Provider   acetaminophen (TYLENOL) 325 mg tablet Take  by mouth every four (4) hours as needed for Pain. Yes Historical Provider   prenatal multivit-ca-min-fe-fa tab Take  by mouth. Yes Historical Provider   oxyCODONE-acetaminophen (PERCOCET) 5-325 mg per tablet Take 1-2 Tabs by mouth every four (4) hours as needed for Pain. Max Daily Amount: 12 Tabs.  7/6/17   Shruthi Guillory MD   AMBULATORY BREAST PUMP Double electric breast pump, dual setup  Z39.1  CHESTER=7/7/17 5/16/17   Shruthi Guillory MD            Objective:  Visit Vitals    BP 95/56    Pulse 66    Ht 5' 3\" (1.6 m)    Wt 110 lb (49.9 kg)    LMP 10/30/2017    Breastfeeding No    BMI 19.49 kg/m2       Physical Exam:   PHYSICAL EXAMINATION    Constitutional  · Appearance: well-nourished, well developed, alert, in no acute distress    Gastrointestinal  · Abdominal Examination: mild suprapubic tenderness to palpation, normal bowel sounds, no masses present; no guarding or rebound  · Liver and spleen: no hepatomegaly present, spleen not palpable  · Hernias: no hernias identified    Genitourinary  · External Genitalia: normal appearance for age, no discharge present, no tenderness present, no inflammatory lesions present, no masses present, no atrophy present  · Vagina: normal vaginal vault without central or paravaginal defects, no blood, small amt clear discharge present, no inflammatory lesions present, no masses present  · Bladder: non-tender to palpation  · Urethra: appears normal  · Cervix: normal   · Uterus: enlarged 7-8wks, soft, non-tender with normal shape and consistency  · Adnexa: no adnexal tenderness present, no adnexal masses present  · Perineum: perineum within normal limits, no evidence of trauma, no rashes or skin lesions present  · Anus: anus within normal limits, no hemorrhoids present  · Inguinal Lymph Nodes: no lymphadenopathy present    Skin  · General Inspection: no rash, no lesions identified    Neurologic/Psychiatric  · Mental Status:  · Orientation: grossly oriented to person, place and time  · Mood and Affect: mood normal, affect appropriate    Assessment:   Threatened AB  Unreliable LMP  US shows viable IUP 7+2 -> CHESTER=8/28/18, nl YS, nl CM. Does show [facility]    Plan:   Rpt US 2wks, EOB same visit. Folder given today. Nuswab G/C collected today.   SAB prec

## 2018-01-11 NOTE — PATIENT INSTRUCTIONS
Threatened Miscarriage: Care Instructions  Your Care Instructions    Some women have light spotting or bleeding during the first 12 weeks of pregnancy. In some cases this is normal. Light spotting or bleeding can also be a sign of a possible loss of the pregnancy. This is called a threatened miscarriage. At this point, the doctor may not be able to tell if your vaginal bleeding is normal or is a sign of a miscarriage. In early pregnancy, things such as stress, exercise, and sex do not cause miscarriage. You may be worried or upset about the possibility of losing your pregnancy. But do not blame yourself. There is no treatment to stop a threatened miscarriage. If you do have a miscarriage, there was nothing you could have done to prevent it. A miscarriage usually means that the pregnancy is not developing normally. The doctor has checked you carefully, but problems can develop later. If you notice any problems or new symptoms, get medical treatment right away. Follow-up care is a key part of your treatment and safety. Be sure to make and go to all appointments, and call your doctor if you are having problems. It's also a good idea to know your test results and keep a list of the medicines you take. How can you care for yourself at home? · If you do have a miscarriage, you will probably have some vaginal bleeding for 1 to 2 weeks. Use pads instead of tampons. · Take acetaminophen (Tylenol) for cramps. Read and follow all instructions on the label. · Do not take two or more pain medicines at the same time unless the doctor told you to. Many pain medicines have acetaminophen, which is Tylenol. Too much acetaminophen (Tylenol) can be harmful. · Do not have sex until your doctor says it is okay. · Get lots of rest over the next several days. · You may do your normal activities if you feel well enough to do them. But do not do any heavy exercise until your doctor says it is okay.   · Eat a balanced diet that is high in iron and vitamin C. Foods rich in iron include red meat, shellfish, eggs, beans, and leafy green vegetables. Foods high in vitamin C include citrus fruits, tomatoes, and broccoli. Talk to your doctor about whether you need to take iron pills or a multivitamin. · Do not drink alcohol or use tobacco or illegal drugs. · Do not smoke. If you need help quitting, talk to your doctor about stop-smoking programs and medicines. These can increase your chances of quitting for good. When should you call for help? Call 911 anytime you think you may need emergency care. For example, call if:  ? · You passed out (lost consciousness). ?Call your doctor now or seek immediate medical care if:  ? · You have severe vaginal bleeding. ? · You are dizzy or lightheaded, or you feel like you may faint. ? · You have new or worse pain in your belly or pelvis. ? · You have a fever. ? · You have vaginal discharge that smells bad. ? Watch closely for changes in your health, and be sure to contact your doctor if:  ? · You do not get better as expected. Where can you learn more? Go to http://marlen-elly.info/. Enter J362 in the search box to learn more about \"Threatened Miscarriage: Care Instructions. \"  Current as of: March 16, 2017  Content Version: 11.4  © 9644-4328 [employer]. Care instructions adapted under license by [employer] (which disclaims liability or warranty for this information). If you have questions about a medical condition or this instruction, always ask your healthcare professional. [place] [address] any warranty or liability for your use of this information.

## 2018-01-15 LAB
C TRACH RRNA SPEC QL NAA+PROBE: NEGATIVE
N GONORRHOEA RRNA SPEC QL NAA+PROBE: NEGATIVE

## 2018-01-25 ENCOUNTER — ROUTINE PRENATAL (OUTPATIENT)
Dept: OBGYN CLINIC | Age: 33
End: 2018-01-25

## 2018-01-25 VITALS
WEIGHT: 110 LBS | HEART RATE: 70 BPM | SYSTOLIC BLOOD PRESSURE: 87 MMHG | HEIGHT: 63 IN | DIASTOLIC BLOOD PRESSURE: 52 MMHG | BODY MASS INDEX: 19.49 KG/M2

## 2018-01-25 DIAGNOSIS — Z23 ENCOUNTER FOR IMMUNIZATION: ICD-10-CM

## 2018-01-25 DIAGNOSIS — N92.6 MISSED MENSES: ICD-10-CM

## 2018-01-25 DIAGNOSIS — Z32.01 POSITIVE PREGNANCY TEST: Primary | ICD-10-CM

## 2018-01-25 PROBLEM — Z34.90 PREGNANCY: Status: RESOLVED | Noted: 2017-01-06 | Resolved: 2018-01-25

## 2018-01-25 LAB
ANTIBODY SCREEN, EXTERNAL: NEGATIVE
HBSAG, EXTERNAL: NEGATIVE
HCT, EXTERNAL: 36.6
HGB, EXTERNAL: 12.3
HIV, EXTERNAL: NEGATIVE
PLATELET CNT,   EXTERNAL: 275
RUBELLA, EXTERNAL: NORMAL
T. PALLIDUM, EXTERNAL: NEGATIVE
TYPE, ABO & RH, EXTERNAL: NORMAL
URINALYSIS, EXTERNAL: NORMAL

## 2018-01-25 NOTE — PATIENT INSTRUCTIONS
Weeks 6 to 10 of Your Pregnancy: Care Instructions  Your Care Instructions    Congratulations on your pregnancy. This is an exciting and important time for you. During the first 6 to 10 weeks of your pregnancy, your body goes through many changes. Your baby grows very fast, even though you cannot feel it yet. You may start to notice that you feel different, both in your body and your emotions. Because each woman's pregnancy is unique, there is no right way to feel. You may feel the healthiest you have ever been, or you may feel tired or sick to your stomach (\"morning sickness\"). These early weeks are a time to make healthy choices and to eat the best foods for you and your baby. This care sheet will give you some ideas. This is also a good time to think about birth defects testing. These are tests done during pregnancy to look for possible problems with the baby. First trimester tests for birth defects can be done between 8 and 17 weeks of pregnancy, depending on the test. Talk with your doctor about what kinds of tests are available. Follow-up care is a key part of your treatment and safety. Be sure to make and go to all appointments, and call your doctor if you are having problems. It's also a good idea to know your test results and keep a list of the medicines you take. How can you care for yourself at home? Eat well  · Eat at least 3 meals and 2 healthy snacks every day. Eat fresh, whole foods, including:  ¨ 7 or more servings of bread, tortillas, cereal, rice, pasta, or oatmeal.  ¨ 3 or more servings of vegetables, especially leafy green vegetables. ¨ 2 or more servings of fruits. ¨ 3 or more servings of milk, yogurt, or cheese. ¨ 2 or more servings of meat, turkey, chicken, fish, eggs, or dried beans. · Drink plenty of fluids, especially water. Avoid sodas and other sweetened drinks. · Choose foods that have important vitamins for your baby, such as calcium, iron, and folate.   ¨ Dairy products, tofu, canned fish with bones, almonds, broccoli, dark leafy greens, corn tortillas, and fortified orange juice are good sources of calcium. ¨ Beef, poultry, liver, spinach, lentils, dried beans, fortified cereals, and dried fruits are rich in iron. ¨ Dark leafy greens, broccoli, asparagus, liver, fortified cereals, orange juice, peanuts, and almonds are good sources of folate. · Avoid foods that could harm your baby. ¨ Do not eat raw or undercooked meat, chicken, or fish (such as sushi or raw oysters). ¨ Do not eat raw eggs or foods that contain raw eggs, such as Caesar dressing. ¨ Do not eat soft cheeses and unpasteurized dairy foods, such as Brie, feta, or blue cheese. ¨ Do not eat fish that contains a lot of mercury, such as shark, swordfish, tilefish, or milana mackerel. Do not eat more than 6 ounces of tuna each week. ¨ Do not eat raw sprouts, especially alfalfa sprouts. ¨ Cut down on caffeine, such as coffee, tea, and cola. Protect yourself and your baby  · Do not touch michaelle litter or cat feces. They can cause an infection that could harm your baby. · High body temperature can be harmful to your baby. So if you want to use a sauna or hot tub, be sure to talk to your doctor about how to use it safely. [place] with morning sickness  · Sip small amounts of water, juices, or shakes. Try drinking between meals, not with meals. · Eat 5 or 6 small meals a day. Try dry toast or crackers when you first get up, and eat breakfast a little later. · Avoid spicy, greasy, and fatty foods. · When you feel sick, open your windows or go for a short walk to get fresh air. · Try nausea wristbands. These help some women. · Tell your doctor if you think your prenatal vitamins make you sick. Where can you learn more? Go to http://shane.info/. Enter G112 in the search box to learn more about \"Weeks 6 to 10 of Your Pregnancy: Care Instructions. \"  Current as of: March 16, 2017  Content Version: 11.4  © 2714-3797 Healthwise, Incorporated. Care instructions adapted under license by [employer] (which disclaims liability or warranty for this information). If you have questions about a medical condition or this instruction, always ask your healthcare professional. [address] any warranty or liability for your use of this information.

## 2018-01-25 NOTE — PROGRESS NOTES
After obtaining consent, and per orders of Dr. Lucho Sánchez, injection of the Influenza Vaccine given in left deltoid by Clarence Ulloa LPN. Patient instructed to remain in clinic for 20 minutes afterwards, and to report any adverse reaction to me immediately.

## 2018-01-25 NOTE — PROGRESS NOTES
Current pregnancy history:    Heather Graham is a ,  33 y.o. female [facility] [place] Patient's last menstrual period was 10/30/2017. .  She presents for the evaluation of amenorrhea and a positive pregnancy test.    Seen 2wks ago for threatened AB.  US<D, IUP with [facility]. Returns today for viability check. Has not had any additional bleeding. LMP history:  Unreliable LMP. Was still nursing, just stopped.     Thinks conception was end of November. Had heavy bleeding . Heavy bleeding lasted 4d, then tapered off. Neg UPT at that time. Had spotting early January. US at that visit showed IUP 7+2 -> CHESTER=18     Ultrasound data:  She had an  ultrasound done by the ultrasound tech today which revealed a viable carrizales pregnancy with a gestational age of 10 weeks and 1 days giving an EDC of 16. TA ULTRASOUND PERFORMED  A SINGLE VIABLE 9W2D IUP IS SEEN WITH NORMAL CARDIAC RHYTHM  GESTATIONAL AGE BASED ON PRIOR US. GOOD INTERVAL GROWTH IS SEEN FROM US ON 2018. A NORMAL YOLK SAC IS SEEN. RIGHT ADNEXA APPEARS WITHIN NORMAL LIMITS. LEFT ADNEXA APPEARS WITHIN NORMAL LIMITS. NO FREE FLUID SEEN IN THE CDS. Pregnancy symptoms:    Since her LMP she has experienced  urinary frequency, breast tenderness, and nausea. Associated signs and symptoms which she denies: dysuria, discharge    Wt unchanged. Relevant past pregnancy history:   She has the following pregnancy history: Her last pregnancy was uncomplicated. She has no history of  delivery. Relevant past medical history:(relevant to this pregnancy): noncontributory. Pap/Occupational history:  Last pap smear: last year Results: Normal      Her occupation is: OT.     Substance history: negative for alcohol, tobacco and street drugs. Positive for nothing. Exposure history: There is/are no indoor cat/s in the home. The patient was instructed to not change the cat litter.    She admits close contact with children on a regular basis. She has had chicken pox or the vaccine in the past.   Patient denies issues with domestic violence. Genetic Screening/Teratology Counseling: (Includes patient, baby's father, or anyone in either family with:)  3.  Patient's age >/= 28 at [facility]?-- no  .   2. Thalassemia ([place], [place], [address], or  background): MCV<80?--no.     3.  Neural tube defect (meningomyelocele, spina bifida, anencephaly)?--no.   4.  Congenital heart defect?--no.  5.  Down syndrome?--no.   6.  Grant-Sachs (Mormon, [place])?--no.   7.  Canavan's Disease?--no.   8.  Familial Dysautonomia?--no.   9.  Sickle cell disease or trait ()? --no   The patient has not been tested for sickle trait  10. Hemophilia or other blood disorders?--no. 11.  Muscular dystrophy?--no. 12.  Cystic fibrosis?--no. 13.  Arp's Chorea?--no. 14.  Mental retardation/autism (if yes was person tested for Fragile X)?--no. 15.  Other inherited genetic or chromosomal disorder?--no. 12.  Maternal metabolic disorder (DM, PKU, etc)?--no. 17.  Patient or FOB with a child with a birth defect not listed above?--no.  17a. Patient or FOB with a birth defect themselves?--no. 18.  Recurrent pregnancy loss, or stillbirth?--no. 19.  Any medications since LMP other than prenatal vitamins (include vitamins, supplements, OTC meds, drugs, alcohol)?--no. 20.  Any other genetic/environmental exposure to discuss?--no. She is mostly [facility] [place] descent, some Mormon heritage (nto Ashkenazi)   is [facility] [place] Native American    Infection History:  1. Lives with someone with TB or TB exposed?--no.   2.  Patient or partner has history of genital herpes?--no.  3.  Rash or viral illness since LMP?--no.    4.  History of STD (GC, CT, HPV, syphilis, HIV)? --no   5. Other: OTHER?       Past Medical History:   Diagnosis Date    Anemia     Routine Papanicolaou smear 07/20/2016    Negative (no hpv) from [address]    Sunburn, blistering      Past Surgical History:   Procedure Laterality Date    HX CHOLECYSTECTOMY  2010    HX WISDOM TEETH EXTRACTION       Social History     Occupational History    Not on file. Social History Main Topics    Smoking status: Never Smoker    Smokeless tobacco: Never Used      Comment: Never used vapor or e-cigs     Alcohol use No    Drug use: No    Sexual activity: Yes     Partners: Male     Birth control/ protection: None     Family History   Problem Relation Age of Onset    Thyroid Disease Mother     Cancer Father      Prostate     Thyroid Disease Maternal Grandmother     Thyroid Disease Other      6 Siblings of Mother h/o Hyperthyroidism      OB History    Para Term  AB Living   2 1 1   1   SAB TAB Ectopic Molar Multiple Live Births       0 1      # Outcome Date GA Lbr Efraín/2nd Weight Sex Delivery Anes PTL Lv   2 Current            1 Term 17 39w5d  6 lb 13.5 oz (3.105 kg) M Vag-Spont EPIDURAL AN N TODD        Allergies   Allergen Reactions    Amoxicillin Rash     Prior to Admission medications    Medication Sig Start Date End Date Taking? Authorizing Provider   IRON, FERROUS SULFATE, PO Take  by mouth. Yes Historical Provider   prenatal multivit-ca-min-fe-fa tab Take  by mouth. Yes Historical Provider   acetaminophen (TYLENOL) 325 mg tablet Take  by mouth every four (4) hours as needed for Pain. Historical Provider   oxyCODONE-acetaminophen (PERCOCET) 5-325 mg per tablet Take 1-2 Tabs by mouth every four (4) hours as needed for Pain. Max Daily Amount: 12 Tabs.  17   Elvira Fierro MD   AMBULATORY BREAST PUMP Double electric breast pump, dual setup  Z39.1  CHESTER=17   Elvira Fierro MD        Review of Systems: History obtained from the patient  Constitutional: negative for weight loss, fever, night sweats  HEENT: negative for hearing loss, earache, congestion, snoring, sore throat  CV: negative for chest pain, palpitations, edema  Resp: negative for cough, shortness of breath, wheezing  Breast: negative for breast lumps, nipple discharge, galactorrhea  GI: negative for change in bowel habits, abdominal pain, black or bloody stools  : negative for frequency, dysuria, hematuria, vaginal discharge  MSK: negative for back pain, joint pain, muscle pain  Skin: negative for itching, rash, hives  Neuro: negative for dizziness, headache, confusion, weakness  Psych: negative for anxiety, depression, change in mood  Heme/lymph: negative for bleeding, bruising, pallor    Objective:  Visit Vitals    BP (!) 87/52    Pulse 70    Ht 5' 3\" (1.6 m)    Wt 110 lb (49.9 kg)    LMP 10/30/2017    Breastfeeding No    BMI 19.49 kg/m2       Physical Exam:     Constitutional  · Appearance: well-nourished, well developed, alert, in no acute distress    HENT  · Head  · Face: appears normal  · Eyes: appear normal  · Ears: normal  · Mouth: normal  · Lips: no lesions    Neck  · Inspection/Palpation: normal appearance, no masses or tenderness  · Lymph Nodes: no lymphadenopathy present  · Thyroid: gland size normal, nontender, no nodules or masses present on palpation    Chest  · Respiratory Effort: breathing unlabored  · Auscultation: normal breath sounds    Cardiovascular  · Heart:  · Auscultation: regular rate and rhythm without murmur    Gastrointestinal  · Abdominal Examination: abdomen non-tender to palpation, normal bowel sounds, no masses present  · Liver and spleen: no hepatomegaly present, spleen not palpable  · Hernias: no hernias identified    Genitourinary  [deferred - done at visit 2wks ago]    Skin  · General Inspection: no rash, no lesions identified    Neurologic/Psychiatric  · Mental Status:  · Orientation: grossly oriented to person, place and time  · Mood and Affect: mood normal, affect appropriate    Assessment:   Intrauterine pregnancy:  - US today shows good interval growth.   Will use previous US for dating -> CHESTER=8/28/18  - flu shot today  - declines aneuploidy screening. Will let me know if she changes her mind      Plan:     · Offered CF testing, CVS, Nuchal Translucency, MSAFP, amnio, and discussed NIPT  · Course of pregnancy discussed including visit schedule, routine U/S, glucola testing, etc.  · Avoid alcoholic beverages and illicit/recreational drugs use  · Take prenatal vitamins or folic acid daily. · Hospital and practice style discussed with coverage system. · Discussed nutrition, toxoplasmosis precautions, sexual activity, exercise, need for influenza vaccine, environmental and work hazards, travel advice, screen for domestic violence, need for seat belts. · Discussed seafood, unpasteurized dairy products, deli meat, artificial sweeteners, and caffeine. · Information on prenatal classes/breastfeeding given. · Patient encouraged not to smoke. · Discussed current prescription drug use. Given medication list.  · Discussed the use of over the counter medications and chemicals. · Pt understands risk of hemorrhage during pregnancy and post delivery and would accept blood products if necessary in life-threatening emergencies    Handouts given to pt. Orders Placed This Encounter    W[place] IMMUNIZ ADMIN,1 SINGLE/COMB VAC/TOXOID    CULTURE, URINE    INFLUENZA VIRUS VACCINE QUADRIVALENT, PRESERVATIVE FREE SYRINGE (72560)    URINALYSIS W/ RFLX MICROSCOPIC    HEP B SURFACE AG    HIV SCREEN, 4TH GEN.  W/REFLEX CONFIRM    CBC W/O DIFF    RUBELLA AB, IGG    T PALLIDUM SCREEN W/REFLEX    TYPE, ABO & RH    ANTIBODY SCREEN

## 2018-01-26 LAB
APPEARANCE UR: CLEAR
BILIRUB UR QL STRIP: NEGATIVE
COLOR UR: YELLOW
GLUCOSE UR QL: NEGATIVE
HGB UR QL STRIP: NEGATIVE
KETONES UR QL STRIP: NEGATIVE
LEUKOCYTE ESTERASE UR QL STRIP: NEGATIVE
MICRO URNS: NORMAL
NITRITE UR QL STRIP: NEGATIVE
PH UR STRIP: 6 [PH] (ref 5–7.5)
PROT UR QL STRIP: NEGATIVE
SP GR UR: 1.01 (ref 1–1.03)
UROBILINOGEN UR STRIP-MCNC: 0.2 MG/DL (ref 0.2–1)

## 2018-01-27 LAB
ABO GROUP BLD: NORMAL
BLD GP AB SCN SERPL QL: NEGATIVE
ERYTHROCYTE [DISTWIDTH] IN BLOOD BY AUTOMATED COUNT: 14 % (ref 12.3–15.4)
HBV SURFACE AG SERPL QL IA: NEGATIVE
HCT VFR BLD AUTO: 36.6 % (ref 34–46.6)
HGB BLD-MCNC: 12.3 G/DL (ref 11.1–15.9)
HIV 1+2 AB+HIV1 P24 AG SERPL QL IA: NON REACTIVE
MCH RBC QN AUTO: 28.2 PG (ref 26.6–33)
MCHC RBC AUTO-ENTMCNC: 33.6 G/DL (ref 31.5–35.7)
MCV RBC AUTO: 84 FL (ref 79–97)
PLATELET # BLD AUTO: 275 X10E3/UL (ref 150–379)
RBC # BLD AUTO: 4.36 X10E6/UL (ref 3.77–5.28)
RH BLD: POSITIVE
RUBV IGG SERPL IA-ACNC: 2.65 INDEX
T PALLIDUM AB SER QL IA: NEGATIVE
WBC # BLD AUTO: 5.8 X10E3/UL (ref 3.4–10.8)

## 2018-01-29 PROBLEM — Z34.90 PREGNANCY: Status: ACTIVE | Noted: 2018-01-29

## 2018-01-29 LAB — BACTERIA UR CULT: ABNORMAL

## 2018-02-07 ENCOUNTER — TELEPHONE (OUTPATIENT)
Dept: OBGYN CLINIC | Age: 33
End: 2018-02-07

## 2018-02-22 ENCOUNTER — ROUTINE PRENATAL (OUTPATIENT)
Dept: OBGYN CLINIC | Age: 33
End: 2018-02-22

## 2018-02-22 VITALS
HEART RATE: 85 BPM | WEIGHT: 105 LBS | SYSTOLIC BLOOD PRESSURE: 97 MMHG | HEIGHT: 63 IN | DIASTOLIC BLOOD PRESSURE: 55 MMHG | BODY MASS INDEX: 18.61 KG/M2

## 2018-02-22 DIAGNOSIS — R31.9 HEMATURIA, UNSPECIFIED TYPE: Primary | ICD-10-CM

## 2018-02-22 DIAGNOSIS — Z34.81 ENCOUNTER FOR SUPERVISION OF OTHER NORMAL PREGNANCY IN FIRST TRIMESTER: ICD-10-CM

## 2018-02-22 LAB — URINALYSIS, EXTERNAL: NORMAL

## 2018-02-22 NOTE — PATIENT INSTRUCTIONS

## 2018-02-22 NOTE — PROGRESS NOTES
Lost 5#. +nausea, finally improving, declines RX. Disc wt gain. Declines aneuploidy screen, may consider AFP. NOB ur cx contam, +3bld today -> UA/C&S. RTO 3-4wks.

## 2018-02-23 LAB
APPEARANCE UR: CLEAR
BACTERIA #/AREA URNS HPF: ABNORMAL /[HPF]
BILIRUB UR QL STRIP: NEGATIVE
CASTS URNS QL MICRO: ABNORMAL /LPF
COLOR UR: YELLOW
EPI CELLS #/AREA URNS HPF: ABNORMAL /HPF
GLUCOSE UR QL: NEGATIVE
HGB UR QL STRIP: ABNORMAL
KETONES UR QL STRIP: NEGATIVE
LEUKOCYTE ESTERASE UR QL STRIP: NEGATIVE
MICRO URNS: ABNORMAL
MUCOUS THREADS URNS QL MICRO: PRESENT
NITRITE UR QL STRIP: NEGATIVE
PH UR STRIP: 5.5 [PH] (ref 5–7.5)
PROT UR QL STRIP: ABNORMAL
RBC #/AREA URNS HPF: ABNORMAL /HPF
SP GR UR: 1.03 (ref 1–1.03)
UROBILINOGEN UR STRIP-MCNC: 0.2 MG/DL (ref 0.2–1)
WBC #/AREA URNS HPF: ABNORMAL /HPF

## 2018-02-24 LAB — BACTERIA UR CULT: ABNORMAL

## 2018-02-26 RX ORDER — NITROFURANTOIN 25; 75 MG/1; MG/1
100 CAPSULE ORAL 2 TIMES DAILY
Qty: 14 CAP | Refills: 0 | Status: SHIPPED | OUTPATIENT
Start: 2018-02-26 | End: 2018-03-05

## 2018-03-06 NOTE — PROGRESS NOTES
Jesus oDyle   to Madonna [address], MD    3/1/18 3:20 PM   Is it alright for me to take the medication you prescribed if I am currently 14 weeks pregnant and may be taking it into my 15th or 16th week?         3/1/18 4:30 PM   You routed this conversation to MD [address]onna [address], Rd, MD   to Jesus Doyle        3/1/18 5:03 PM   You are referring to the antibiotic, Macrobid? Yes -- OK to take anytime during the pregnancy. Dr. Carlitos Lynne      Last read by Jesus Doyle at 3:23 PM on 3/2/2018.

## 2018-03-19 ENCOUNTER — ROUTINE PRENATAL (OUTPATIENT)
Dept: OBGYN CLINIC | Age: 33
End: 2018-03-19

## 2018-03-19 VITALS
WEIGHT: 106 LBS | SYSTOLIC BLOOD PRESSURE: 92 MMHG | HEIGHT: 63 IN | BODY MASS INDEX: 18.78 KG/M2 | HEART RATE: 85 BPM | DIASTOLIC BLOOD PRESSURE: 56 MMHG

## 2018-03-19 DIAGNOSIS — Z34.82 ENCOUNTER FOR SUPERVISION OF OTHER NORMAL PREGNANCY IN SECOND TRIMESTER: Primary | ICD-10-CM

## 2018-03-19 LAB — AFP, MATERNAL, EXTERNAL: NORMAL

## 2018-03-19 NOTE — PATIENT INSTRUCTIONS
Weeks 14 to 18 of Your Pregnancy: Care Instructions  Your Care Instructions    During this time, you may start to \"show,\" so that you look pregnant to people around you. You may also notice some changes in your skin, such as itchy spots on your palms or acne on your face. Your baby is now able to pass urine, and your baby's first stool (meconium) is starting to collect in his or her intestines. Hair is also beginning to grow on your baby's head. At your next visit, between weeks 18 and 20, your doctor may do an ultrasound test. The test allows your doctor to check for certain problems. Your doctor can also tell the sex of your baby. This is a good time to think about whether you want to know whether your baby is a boy or a girl. Talk to your doctor about getting a flu shot to help keep you healthy during your pregnancy. As your pregnancy moves along, it is common to worry or feel anxious. Your body is changing a lot. And you are thinking about giving birth, the health of your baby, and becoming a parent. You can learn to cope with any anxiety and stress you feel. Follow-up care is a key part of your treatment and safety. Be sure to make and go to all appointments, and call your doctor if you are having problems. It's also a good idea to know your test results and keep a list of the medicines you take. How can you care for yourself at home? ?Reduce stress  ? · Ask for help with cooking and housekeeping. ? · Figure out who or what causes your stress. Avoid these people or situations as much as possible. ? · Relax every day. Taking 10- to 15-minute breaks can make a big difference. Take a walk, listen to music, or take a warm bath. ? · Learn relaxation techniques at prenatal or yoga class. Or buy a relaxation tape. ? · List your fears about having a baby and becoming a parent. Share the list with someone you trust. Decide which worries are really small, and try to let them go. Exercise  ?  · If you did not exercise much before pregnancy, start slowly. Walking is best. Manny Lighter yourself, and do a little more every day. ? · Brisk walking, easy jogging, low-impact aerobics, water aerobics, and yoga are good choices. Some sports, such as scuba diving, horseback riding, downhill skiing, gymnastics, and water skiing, are not a good idea. ? · Try to do at least 2½ hours a week of moderate exercise, such as a fast walk. One way to do this is to be active 30 minutes a day, at least 5 days a week. It's fine to be active in blocks of 10 minutes or more throughout your day and week. ? · Wear loose clothing. And wear shoes and a bra that provide good support. ? · Warm up and cool down to start and finish your exercise. ? · If you want to use weights, be sure to use light weights. They reduce stress on your joints. ?Stay at the best weight for you  ? · Experts recommend that you gain about 1 pound a month during the first 3 months of your pregnancy. ? · Experts recommend that you gain about 1 pound a week during your last 6 months of pregnancy, for a total weight gain of 25 to 35 pounds. ? · If you are underweight, you will need to gain more weight (about 28 to 40 pounds). ? · If you are overweight, you may not need to gain as much weight (about 15 to 25 pounds). ? · If you are gaining weight too fast, use common sense. Exercise every day, and limit sweets, fast foods, and fats. Choose lean meats, fruits, and vegetables. ? · If you are having twins or more, your doctor may refer you to a dietitian. Where can you learn more? Go to http://marlen-elly.info/. Enter N560 in the search box to learn more about \"Weeks 14 to 18 of Your Pregnancy: Care Instructions. \"  Current as of: March 16, 2017  Content Version: 11.4  © 9793-2725 [employer]. Care instructions adapted under license by [employer] (which disclaims liability or warranty for this information).  If you have questions about a medical condition or this instruction, always ask your healthcare professional. [place] [address] any warranty or liability for your use of this information.

## 2018-03-19 NOTE — PROGRESS NOTES
N/V finally improving. Rec Boost/Ensure. URI -> OTC meds. MSAFP today (declines aneuploidy). RTO 4wks with US.

## 2018-03-21 LAB
AFP ADJ MOM SERPL: 0.73
AFP INTERP SERPL-IMP: NORMAL
AFP INTERP SERPL-IMP: NORMAL
AFP SERPL-MCNC: 32.9 NG/ML
AGE AT DELIVERY: 32 YEARS
BACTERIA UR CULT: NORMAL
COMMENT, 01827: NORMAL
GA METHOD: NORMAL
GA: 16.6 WEEKS
IDDM PATIENT QL: NO
MULTIPLE PREGNANCY: NO
NEURAL TUBE DEFECT RISK FETUS: NORMAL %
RESULTS, 017004: NORMAL

## 2018-04-12 ENCOUNTER — ROUTINE PRENATAL (OUTPATIENT)
Dept: OBGYN CLINIC | Age: 33
End: 2018-04-12

## 2018-04-12 VITALS
HEART RATE: 77 BPM | WEIGHT: 110 LBS | SYSTOLIC BLOOD PRESSURE: 93 MMHG | HEIGHT: 63 IN | DIASTOLIC BLOOD PRESSURE: 50 MMHG | BODY MASS INDEX: 19.49 KG/M2

## 2018-04-12 DIAGNOSIS — Z34.82 ENCOUNTER FOR SUPERVISION OF OTHER NORMAL PREGNANCY IN SECOND TRIMESTER: Primary | ICD-10-CM

## 2018-04-12 DIAGNOSIS — O43.102 PLACENTAL ABNORMALITY IN SECOND TRIMESTER: ICD-10-CM

## 2018-04-12 DIAGNOSIS — Z3A.20 20 WEEKS GESTATION OF PREGNANCY: ICD-10-CM

## 2018-04-12 NOTE — PATIENT INSTRUCTIONS
Learning About When to Call Your Doctor During Pregnancy (After 20 Weeks)  Your Care Instructions  It's common to have concerns about what might be a problem during pregnancy. Although most pregnant women don't have any serious problems, it's important to know when to call your doctor if you have certain symptoms or signs of labor. These are general suggestions. Your doctor may give you some more information about when to call. When to call your doctor (after 20 weeks)  Call 911 anytime you think you may need emergency care. For example, call if:  · You have severe vaginal bleeding. · You have sudden, severe pain in your belly. · You passed out (lost consciousness). · You have a seizure. · You see or feel the umbilical cord. · You think you are about to deliver your baby and can't make it safely to the hospital.  Call your doctor now or seek immediate medical care if:  · You have vaginal bleeding. · You have belly pain. · You have a fever. · You have symptoms of preeclampsia, such as:  ¨ Sudden swelling of your face, hands, or feet. ¨ New vision problems (such as dimness or blurring). ¨ A severe headache. · You have a sudden release of fluid from your vagina. (You think your water broke.)  · You think that you may be in labor. This means that you've had at least 4 contractions within 20 minutes or at least 8 contractions in an hour. · You notice that your baby has stopped moving or is moving much less than normal.  · You have symptoms of a urinary tract infection. These may include:  ¨ Pain or burning when you urinate. ¨ A frequent need to urinate without being able to pass much urine. ¨ Pain in the flank, which is just below the rib cage and above the waist on either side of the back. ¨ Blood in your urine. Watch closely for changes in your health, and be sure to contact your doctor if:  · You have vaginal discharge that smells bad.   · You have skin changes, such as:  ¨ A rash.  ¨ Itching. ¨ Yellow color to your skin. · You have other concerns about your pregnancy. If you have labor signs at 37 weeks or more  If you have signs of labor at 37 weeks or more, your doctor may tell you to call when your labor becomes more active. Symptoms of active labor include:  · Contractions that are regular. · Contractions that are less than 5 minutes apart. · Contractions that are hard to talk through. Follow-up care is a key part of your treatment and safety. Be sure to make and go to all appointments, and call your doctor if you are having problems. It's also a good idea to know your test results and keep a list of the medicines you take. Where can you learn more? Go to http://marlen-elly.info/. Enter  in the search box to learn more about \"Learning About When to Call Your Doctor During Pregnancy (After 20 Weeks). \"  Current as of: March 16, 2017  Content Version: 11.4  © 0703-0752 Healthwise, Incorporated. Care instructions adapted under license by [employer]" (which disclaims liability or warranty for this information). If you have questions about a medical condition or this instruction, always ask your healthcare professional. [place] [address] any warranty or liability for your use of this information.

## 2018-04-12 NOTE — PROGRESS NOTES
FETAL SURVEY  A SINGLE VIABLE IUP AT 20W2D GA BY LMP. FETAL CARDIAC MOTION OBSERVED. FETAL ANATOMY WELL VISUALIZED AND APPEARS WITHIN NORMAL LIMITS. NO ABNORMALITIES IDENTIFIED ON TODAYS EXAM.  APPROPRIATE GROWTH MEASURED; SIZE = DATES. HANNAH AND CERVIX APPEAR WITHIN NORMAL LIMITS. PLACENT APPEARS THICKENED AND GLOBULAR. GENDER: XY, PATIENT DOES NOT KNOW. No c/o. +FM. US today shows nl fetal anatomy; placenta fundal, appears thickened in some images (?artifact?) -> MFM.  BHAVYA 4wks with 1hr/CBC. - Unreliable LMP, US for dating  - US-  shows viable IUP 7+2 -> CHESTER=8/28/18, nl YS, nl CM. Does show [facility]  - US (1/25/18) 9+1 -> CHESTER=8/29/18  - EOB ur cx contam -> rpt ur cx 50-100K enterococcus -> Macrobid, PANKAJ (3/19) neg  - MSAFP low risk. Declines aneuploidy.  - US (4/12/18) 20+1 @ 20+2. Fetal anatomy nl (does not want to know gender).  Placenta fundal, appears thickened in some images (?artifact) -> MFM

## 2018-04-17 ENCOUNTER — HOSPITAL ENCOUNTER (OUTPATIENT)
Dept: PERINATAL CARE | Age: 33
Discharge: HOME | End: 2018-04-17
Attending: OBSTETRICS & GYNECOLOGY
Payer: COMMERCIAL

## 2018-04-17 PROCEDURE — 76805 OB US >/= 14 WKS SNGL FETUS: CPT | Performed by: OBSTETRICS & GYNECOLOGY

## 2018-05-10 ENCOUNTER — OFFICE VISIT (OUTPATIENT)
Dept: OBGYN CLINIC | Age: 33
End: 2018-05-10

## 2018-05-10 ENCOUNTER — ROUTINE PRENATAL (OUTPATIENT)
Dept: OBGYN CLINIC | Age: 33
End: 2018-05-10

## 2018-05-10 VITALS
TEMPERATURE: 84 F | HEIGHT: 63 IN | DIASTOLIC BLOOD PRESSURE: 58 MMHG | SYSTOLIC BLOOD PRESSURE: 90 MMHG | WEIGHT: 112 LBS | BODY MASS INDEX: 19.84 KG/M2

## 2018-05-10 VITALS
WEIGHT: 112 LBS | HEIGHT: 63 IN | DIASTOLIC BLOOD PRESSURE: 58 MMHG | BODY MASS INDEX: 19.84 KG/M2 | HEART RATE: 84 BPM | SYSTOLIC BLOOD PRESSURE: 90 MMHG

## 2018-05-10 DIAGNOSIS — Z34.82 ENCOUNTER FOR SUPERVISION OF OTHER NORMAL PREGNANCY IN SECOND TRIMESTER: Primary | ICD-10-CM

## 2018-05-10 DIAGNOSIS — O26.12 LOW WEIGHT GAIN DURING PREGNANCY IN SECOND TRIMESTER: ICD-10-CM

## 2018-05-10 DIAGNOSIS — Z3A.24 24 WEEKS GESTATION OF PREGNANCY: ICD-10-CM

## 2018-05-10 LAB
GTT, 1 HR, GLUCOLA, EXTERNAL: 93
HCT, EXTERNAL: 33.1
HGB, EXTERNAL: 11.2
PLATELET CNT,   EXTERNAL: 236
TSH SERPL-ACNC: 0.48 M[IU]/L

## 2018-05-10 NOTE — PROGRESS NOTES
+FM. Some bilat hip pain, will call if wants PT. Poor wt gain, h/o borderline hyperthyroid, FHx of thyroid (hyper and hypo) -> TSH, FT4 with labs today (1hr, CBC). Tick bite yest, no rash, etc -- should not need prophylaxis, can check with PCP. RTO 4wks.

## 2018-05-10 NOTE — PATIENT INSTRUCTIONS
Weeks 22 to 26 of Your Pregnancy: Care Instructions  Your Care Instructions    As you enter your 7th month of pregnancy at week 26, your baby's lungs are growing stronger and getting ready to breathe. You may notice that your baby responds to the sound of your or your partner's voice. You may also notice that your baby does less turning and twisting and more squirming or jerking. Jerking often means that your baby has the hiccups. Hiccups are perfectly normal and are only temporary. You may want to think about attending a childbirth preparation class. This is also a good time to start thinking about whether you want to have pain medicine during labor. Most pregnant women are tested for gestational diabetes between weeks 25 and 28. Gestational diabetes occurs when your blood sugar level gets too high when you're pregnant. The test is important, because you can have gestational diabetes and not know it. But the condition can cause problems for your baby. Follow-up care is a key part of your treatment and safety. Be sure to make and go to all appointments, and call your doctor if you are having problems. It's also a good idea to know your test results and keep a list of the medicines you take. How can you care for yourself at home? Ease discomfort from your baby's kicking  · Change your position. Sometimes this will cause your baby to change position too. · Take a deep breath while you raise your arm over your head. Then breathe out while you drop your arm. Do Kegel exercises to prevent urine from leaking  · You can do Kegel exercises while you stand or sit. ¨ Squeeze the same muscles you would use to stop your urine. Your belly and thighs should not move. ¨ Hold the squeeze for 3 seconds, and then relax for 3 seconds. ¨ Start with 3 seconds. Then add 1 second each week until you are able to squeeze for 10 seconds. ¨ Repeat the exercise 10 to 15 times for each session.  Do three or more sessions each day.  Ease or reduce swelling in your feet, ankles, hands, and fingers  · If your fingers are puffy, take off your rings. · Do not eat high-salt foods, such as potato chips. · Prop up your feet on a stool or couch as much as possible. Sleep with pillows under your feet. · Do not stand for long periods of time or wear tight shoes. · Wear support stockings. Where can you learn more? Go to http://marlen-elly.info/. Enter G264 in the search box to learn more about \"Weeks 22 to 26 of Your Pregnancy: Care Instructions. \"  Current as of: March 16, 2017  Content Version: 11.4  © 9570-4922 Healthwise, FAD ? IO. Care instructions adapted under license by [employer] (which disclaims liability or warranty for this information). If you have questions about a medical condition or this instruction, always ask your healthcare professional. [place] [address] any warranty or liability for your use of this information.

## 2018-05-11 ENCOUNTER — PATIENT OUTREACH (OUTPATIENT)
Dept: OTHER | Age: 33
End: 2018-05-11

## 2018-05-11 LAB
ERYTHROCYTE [DISTWIDTH] IN BLOOD BY AUTOMATED COUNT: 14.4 % (ref 12.3–15.4)
GLUCOSE 1H P 50 G GLC PO SERPL-MCNC: 93 MG/DL (ref 65–139)
HCT VFR BLD AUTO: 33.1 % (ref 34–46.6)
HGB BLD-MCNC: 11.2 G/DL (ref 11.1–15.9)
MCH RBC QN AUTO: 29.9 PG (ref 26.6–33)
MCHC RBC AUTO-ENTMCNC: 33.8 G/DL (ref 31.5–35.7)
MCV RBC AUTO: 89 FL (ref 79–97)
PLATELET # BLD AUTO: 236 X10E3/UL (ref 150–379)
RBC # BLD AUTO: 3.74 X10E6/UL (ref 3.77–5.28)
T4 FREE SERPL-MCNC: 1.02 NG/DL (ref 0.82–1.77)
TSH SERPL DL<=0.005 MIU/L-ACNC: 0.48 UIU/ML (ref 0.45–4.5)
WBC # BLD AUTO: 6.4 X10E3/UL (ref 3.4–10.8)

## 2018-05-11 NOTE — PROGRESS NOTES
11:23a - Message received from previous PANKAJ patient wanting this CC's assistance with finding a PCP.    11:35a - Returned call to patient - Verified  and zip code with patient as identifiers. Patient is requesting assistance with finding a PCP. -Reports that she was bitten by a tick on Wednesday and is pregnant, concerned about Lyme's Disease. 11:42a - Called [facility] [address] patient identifiers.               -No new patient appt until August    11:48a - Returned call to patient to give information. Searched again on Doc Find - found another practice that was close. 11:59- Called [address] Identifiers. -Appt made for 18 @ 1:20p MELISSA Kasper    -Bring: Picture ID , Co Pay - $10, Insurance Card, List of Medications    12:10p - Returned call to patient to inform of appt. Patient verbalized understanding by repeating information. Next outreach 18 - f/u new PCP appt.

## 2018-05-16 ENCOUNTER — OFFICE VISIT (OUTPATIENT)
Dept: FAMILY MEDICINE CLINIC | Age: 33
End: 2018-05-16

## 2018-05-16 VITALS
HEIGHT: 63 IN | TEMPERATURE: 97.4 F | OXYGEN SATURATION: 99 % | HEART RATE: 80 BPM | SYSTOLIC BLOOD PRESSURE: 87 MMHG | RESPIRATION RATE: 16 BRPM | BODY MASS INDEX: 20.62 KG/M2 | DIASTOLIC BLOOD PRESSURE: 51 MMHG | WEIGHT: 116.4 LBS
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
## 2018-05-16 RX ORDER — DOXYCYCLINE 100 MG/1
100 TABLET ORAL 2 TIMES DAILY
Qty: 20 TAB | Refills: 0 | Status: SHIPPED | OUTPATIENT
Start: 2018-05-16 | End: 2018-05-26

## 2018-05-16 NOTE — MR AVS SNAPSHOT
Nora Bang 
 
 
 [address] [facility] 
[address] [place] [address] 
[phone] Patient: Aram Berkowitz MRN: [id number] OFC: Visit Information Date & Time Provider Department Dept. Phone Encounter #  
 2018  1:20 PM Julio César Perkins NP [facility] [phone] [id number]  
  
 2018 10:00 AM  
OB VISIT with Carlean Angelucci, MD [place] ([facility]) Appt Note: 28w2d -  Consent - Offer TDap (MG)  
 [address] [facility] [address] [place] [id number]  
[phone]  
  
   
 [address] [place] [address]  
  
    
 2018 10:00 AM  
OB VISIT with Carlean Angelucci, MD [place] ([facility]) Appt Note: MFM 1st at 0800 - DM after (MG)  
 [address] [facility] [address] [place] [address]  
[phone] Upcoming Health Maintenance Date Due DTaP/Tdap/Td series (1 - Tdap) 2007 Influenza Age 5 to Adult 2018 PAP AKA CERVICAL CYTOLOGY 2019 Allergies as of 2018  Review Complete On: 2018 By: Robb Pearson LPN Severity Noted Reaction Type Reactions Amoxicillin  2014   Systemic Rash Current Immunizations  Never Reviewed Name Date Influenza Vaccine (Quad) PF 2018, 2016 Not reviewed this visit You Were Diagnosed With   
  
 Codes Comments Tick bite, initial encounter    -  Primary ICD-10-CM: W57. Zeus [place] ICD-9-CM: 919.4, E906.4 Vitals BP Pulse Temp Resp Height(growth percentile) Weight(growth percentile) (!) 87/51 (BP 1 Location: Left arm, BP Patient Position: Sitting) 80 97.4 °F (36.3 °C) (Oral) 16 5' 3\" (1.6 m) 116 lb 6.4 oz (52.8 kg) LMP SpO2 BMI OB Status Smoking Status 10/30/2017 99% 20.62 kg/m2 Pregnant Never Smoker Vitals History BMI and BSA Data  Body Mass Index Body Surface Area  
 20.62 kg/m 2 1.53 m 2  
  
  
 Preferred Pharmacy Pharmacy Name Phone [id number] [phone] Your Updated Medication List  
  
   
This list is accurate as of 5/16/18  2:31 PM.  Always use your most recent med list.  
  
  
  
  
 doxycycline 100 mg tablet Commonly known as:  ADOXA Take 1 Tab by mouth two (2) times a day for 10 days. IRON (FERROUS SULFATE) PO Take  by mouth.  
  
 prenatal multivit-ca-min-fe-fa Tab Take  by mouth. Prescriptions Printed Refills  
 doxycycline (ADOXA) 100 mg tablet 0 Sig: Take 1 Tab by mouth two (2) times a day for 10 days. Class: Print Route: Oral  
  
We Performed the Following EHRLICHIOSIS (HME AND HGE) AB PANEL [75576 CPT(R)] R RICKETTSII AB IGG W/REFL [[id number] Custom] To-Do List   
 05/16/2018 Lab:  LYME AB TOTAL W/RFLX W BLOT   
  
 06/19/2018 8:00 AM  
  Appointment with ULTRASOUND 3 [facility]M at [facility] ([phone]) Patient Instructions 1) Fair Life milk is a healthy choice in milk products. It is double filtered to remove much of the lactose (sugar found in milk) and recommended by trainers and nutritionists. There is 13 g of protein in a serving, so it is an easy way to increase your protein and calcium intake. 2) There are many different types of ticks in the [place], some of which are capable of transmitting infections. The risk of developing these infections depends upon the geographic location, season of the year, type of tick, and, for Lyme disease, how long the tick was attached to the skin. While many people are concerned after being bitten by a tick, the risk of acquiring a tick-borne infection is quite low, even if the tick has been attached, fed, and is actually carrying an infectious agent.  Ticks transmit infection only after they have attached and then taken a blood meal from their new host. A tick that has not attached (and therefore has not yet become engorged from its blood meal) has not passed any infection. Since the deer tick that transmits Lyme disease must feed for >36 hours before transmission of the spirochete, the risk of acquiring Lyme disease from an observed tick bite, for example, is only 1.2 to 1.4 percent, even in an area where the disease is common. The organism that causes Lyme disease, Borrelia burgdorferi, lies dormant in the inner aspect of the tick's midgut. The organism becomes active only after exposure to the warm blood meal entering the tick's gut. Once active, the organism enters the tick's salivary glands. As the tick feeds, it must get rid of excess water through the salivary glands. Thus, the tick will literally salivate organisms into the wound, thereby passing the infection to the host. 
 
If a person is bitten by a deer tick (the type of tick that carries Lyme disease), a healthcare provider will likely advise one of two approaches: ?Observe and treat if signs or symptoms of infection develop ? Treat with a preventive antibiotic immediately There is no benefit of blood testing for Lyme disease at the time of the tick bite; even people who become infected will not have a positive blood test until approximately two to six weeks after the infection develops (post-tick bite). The history of the tick bite will largely determine which of these options is chosen. Before seeking medical attention, the affected person or household member should carefully remove the tick and make note of its appearance. Only the Ixodes species of tick, also known as the deer tick, causes Lyme disease. HOW TO REMOVE A TICK  The proper way to remove a tick is to use a set of fine tweezers and  the tick as close to the skin as is possible.  Do not use a smoldering match or cigarette, nail polish, petroleum jelly (eg, Vaseline), liquid soap, or kerosene because they may irritate the tick and cause it to behave like a syringe, injecting bodily fluids into the wound. The proper technique for tick removal includes the following: 
?Use fine tweezers to grasp the tick as close to the skin surface as possible. ?Pull backwards gently but firmly, using an even, steady pressure. Do not jerk or twist. 
Do not squeeze, crush, or puncture the body of the tick, since its bodily fluids may contain infection-causing organisms. ? After removing the tick, wash the skin and hands thoroughly with soap and water. ?If any mouth parts of the tick remain in the skin, these should be left alone; they will be expelled on their own. Attempts to remove these parts may result in significant skin trauma. AFTER THE TICK IS REMOVED Tick characteristics  It is helpful if the person can provide information about the size of the tick, whether it was actually attached to the skin, if it was engorged (that is, full of blood), and how long it was attached. ?The size and color of the tick help to determine what kind of tick it was. ?Ticks that are brown and approximately the size of a poppy seed or pencil point are deer ticks. These can transmit Borrelia burgdorferi (the bacterium that causes Lyme disease) and a number of other tick-borne infections, including babesiosis and anaplasmosis. Borrelia burgdorferi infected deer ticks live primarily in the [facility] and [place] region ([place] to [place]) and in [address] [place] [address] ([place]) region of the [place], and less commonly in the [place] ([address] [place] [address]). ?Ticks that are brown with a white collar and about the size of a pencil eraser are more likely to be dog ticks (Dermacentor species).  These ticks do not carry Lyme disease, but can rarely carry another tick-borne infection called [facility] spotted fever that can be serious or even fatal. 
 ?A tick that was not attached, was easy to remove or just walking on the skin, and was still flat and tiny and not full of blood when it was removed could not have transmitted Lyme disease or any other infection since it had not yet taken a blood meal. ?Only ticks that are attached and have finished feeding or are near the end of their meal can transmit Lyme disease. After arriving on the skin, the tick that spreads Lyme disease usually takes 24 hours before feeding begins. ? Even if a tick is attached, it must have taken a blood meal to transmit Lyme disease. At least 36 to 48 hours of feeding is required for a tick to have fed and then transmit the bacterium that causes Lyme disease. After this amount of time, the tick will be engorged (full of blood). An engorged tick has a globular shape and is larger than an unengorged one. ?It is not clear how long a tick needs to be attached to transmit bacteria other than Borrelia burgdorferi. UpToDate information:  
 
Pregnant women  We recommend doxycycline for the treatment of pregnant women with RMSF rather than chloramphenicol. It should be administered as 100 mg twice daily (orally or intravenously). This approach is consistent with recommendations by the [facility] and the [facility] for Disease Control and Prevention, which reflect the opinion of experts in the field [16]. Chloramphenicol had traditionally been the preferred treatment for most pregnant women with RMSF. Tetracyclines were generally contraindicated in pregnancy because of the risk of hepatotoxicity in the mother [13] and adverse effects on fetal bone and teeth (eg, permanent discoloration of deciduous teeth from in utero exposure in the second and third trimesters [17], incorporation into fetal long tubular bones with transient inhibition of growth [18]).  However, these events are extremely rare with doxycycline, and subsequent evidence has supported the relative safety of doxycycline compared with older tetracyclines in both pregnancy and in children [19,20]. As an example, in a systematic review, there was no correlation between the use of doxycycline and teratogenic effects during pregnancy or dental staining in children [19]. Thus, given the efficacy of doxycycline and the lack of other good alternatives, the benefits generally outweigh the risks. Children  The dose of doxycycline for children who weigh ? 45 kg is 2.2 mg/kg/dose twice per day (maximum daily dose 200 mg) [13,21]. Children who weigh >45 kg should receive the adult dose. Tetracyclines can cause dental staining when administered to children younger than eight years. However, the risk of dental staining with doxycycline is minimal if a short course is administered [22], and in an observational study of 53 children who received approximately two courses of doxycycline for RMSF before they were eight years old, none developed dental staining in their permanent teeth [23]. Alternative agent  Chloramphenicol is the only known alternative agent for the treatment of RMSF. To our knowledge, no other alternative agents have been tested in humans. Although chloramphenicol has been found to have activity against RMSF in both animal and human studies [24-26], it appears to be less effective than doxycycline [5,21]. (See 'Doxycycline as preferred agent' above.) Chloramphenicol is typically used in the rare setting when an individual has a history of a severe adverse reaction to doxycycline (eg, toxic epidermal necrolysis, severe hepatoxicity). The dose of chloramphenicol is 50 mg/kg per day in four divided doses (maximum 4 grams per day); dose reductions are required if used to treat neonates [27]. However, chloramphenicol may be difficult to rapidly obtain, and therefore, may not be a feasible option given the need to initiate therapy early in the course of disease.  There is a low risk (1 in 25,000 to 40,000) of fatal aplastic anemia with chloramphenicol, and as a result, systemic use is restricted in many countries. If chloramphenicol cannot be obtained, doxycycline is the only studied treatment option. For individuals with a history of an adverse reaction to doxycycline, consultation with an allergist should be obtained, if possible, to help minimize the risk of an adverse event. Introducing [facility]! Dear Terri Rogel: Thank you for requesting a [employer] account. Our records indicate that you already have an active [employer] account. You can access your account anytime at [email or web address] Did you know that you can access your hospital and ER discharge instructions at any time in Uni[employer]? You can also review all of your test results from your hospital stay or ER visit. Additional Information If you have questions, please visit the Frequently Asked Questions section of the [employer] website at [email or web address] Remember, [employer] is NOT to be used for urgent needs. For medical emergencies, dial 911. Now available from your iPhone and Android! Please provide this summary of care documentation to your next provider. Lyme Disease Testing Disclaimer:   
 § 84.1-1504.8. (Expires July 1, 2018) Lyme disease testing information disclosure. A. Every licensee or his in-office designee who orders a laboratory test for the presence of Lyme disease shall provide to the patient or his legal representative the following written information: \"ACCORDING TO THE CENTERS FOR DISEASE CONTROL AND PREVENTION, AS OF 2011 LYME DISEASE IS THE SIXTH FASTEST GROWING DISEASE IN THE [place]. YOUR HEALTH CARE PROVIDER HAS ORDERED A LABORATORY TEST FOR THE PRESENCE OF LYME DISEASE FOR YOU.  CURRENT LABORATORY TESTING FOR LYME DISEASE CAN BE PROBLEMATIC AND STANDARD LABORATORY TESTS OFTEN RESULT IN FALSE NEGATIVE AND FALSE POSITIVE RESULTS, AND IF DONE TOO EARLY, YOU MAY NOT HAVE PRODUCED ENOUGH ANTIBODIES TO BE CONSIDERED POSITIVE BECAUSE YOUR IMMUNE RESPONSE REQUIRES TIME TO DEVELOP ANTIBODIES. IF YOU ARE TESTED FOR LYME DISEASE, AND THE RESULTS ARE NEGATIVE, THIS DOES NOT NECESSARILY MEAN YOU DO NOT HAVE LYME DISEASE. IF YOU CONTINUE TO EXPERIENCE SYMPTOMS, YOU SHOULD CONTACT YOUR HEALTH CARE PROVIDER AND INQUIRE ABOUT THE APPROPRIATENESS OF RETESTING OR ADDITIONAL TREATMENT. \"  
B. Licensees shall be immune from civil liability for the provision of the written information required by this section absent gross negligence or willful misconduct. Your primary care clinician is listed as Madonna S Romain Solomon. If you have any questions after today's visit, please call [phone].

## 2018-05-16 NOTE — PATIENT INSTRUCTIONS
1) Fair Life milk is a healthy choice in milk products. It is double filtered to remove much of the lactose (sugar found in milk) and recommended by trainers and nutritionists. There is 13 g of protein in a serving, so it is an easy way to increase your protein and calcium intake. 2) There are many different types of ticks in the [place], some of which are capable of transmitting infections. The risk of developing these infections depends upon the geographic location, season of the year, type of tick, and, for Lyme disease, how long the tick was attached to the skin. While many people are concerned after being bitten by a tick, the risk of acquiring a tick-borne infection is quite low, even if the tick has been attached, fed, and is actually carrying an infectious agent. Ticks transmit infection only after they have attached and then taken a blood meal from their new host. A tick that has not attached (and therefore has not yet become engorged from its blood meal) has not passed any infection. Since the deer tick that transmits Lyme disease must feed for >36 hours before transmission of the spirochete, the risk of acquiring Lyme disease from an observed tick bite, for example, is only 1.2 to 1.4 percent, even in an area where the disease is common. The organism that causes Lyme disease, Borrelia burgdorferi, lies dormant in the inner aspect of the tick's midgut. The organism becomes active only after exposure to the warm blood meal entering the tick's gut. Once active, the organism enters the tick's salivary glands. As the tick feeds, it must get rid of excess water through the salivary glands.  Thus, the tick will literally salivate organisms into the wound, thereby passing the infection to the host.    If a person is bitten by a deer tick (the type of tick that carries Lyme disease), a healthcare provider will likely advise one of two approaches:    ?Observe and treat if signs or symptoms of infection develop  ? Treat with a preventive antibiotic immediately    There is no benefit of blood testing for Lyme disease at the time of the tick bite; even people who become infected will not have a positive blood test until approximately two to six weeks after the infection develops (post-tick bite). The history of the tick bite will largely determine which of these options is chosen. Before seeking medical attention, the affected person or household member should carefully remove the tick and make note of its appearance. Only the Ixodes species of tick, also known as the deer tick, causes Lyme disease. HOW TO REMOVE A TICK -- The proper way to remove a tick is to use a set of fine tweezers and  the tick as close to the skin as is possible. Do not use a smoldering match or cigarette, nail polish, petroleum jelly (eg, Vaseline), liquid soap, or kerosene because they may irritate the tick and cause it to behave like a syringe, injecting bodily fluids into the wound. The proper technique for tick removal includes the following:  ?Use fine tweezers to grasp the tick as close to the skin surface as possible. ?Pull backwards gently but firmly, using an even, steady pressure. Do not jerk or twist.  Do not squeeze, crush, or puncture the body of the tick, since its bodily fluids may contain infection-causing organisms. ? After removing the tick, wash the skin and hands thoroughly with soap and water. ?If any mouth parts of the tick remain in the skin, these should be left alone; they will be expelled on their own. Attempts to remove these parts may result in significant skin trauma. AFTER THE TICK IS REMOVED  Tick characteristics -- It is helpful if the person can provide information about the size of the tick, whether it was actually attached to the skin, if it was engorged (that is, full of blood), and how long it was attached. ?The size and color of the tick help to determine what kind of tick it was.   ?Ticks that are brown and approximately the size of a poppy seed or pencil point are deer ticks. These can transmit Borrelia burgdorferi (the bacterium that causes Lyme disease) and a number of other tick-borne infections, including babesiosis and anaplasmosis. Borrelia burgdorferi infected deer ticks live primarily in the [facility] and [place] region ([place] to [place]) and in [address] [place] [address] ([place]) region of the [place], and less commonly in the [place] ([address] [place] [address]). ?Ticks that are brown with a white collar and about the size of a pencil eraser are more likely to be dog ticks (Dermacentor species). These ticks do not carry Lyme disease, but can rarely carry another tick-borne infection called [facility] spotted fever that can be serious or even fatal.  ?A tick that was not attached, was easy to remove or just walking on the skin, and was still flat and tiny and not full of blood when it was removed could not have transmitted Lyme disease or any other infection since it had not yet taken a blood meal.  ?Only ticks that are attached and have finished feeding or are near the end of their meal can transmit Lyme disease. After arriving on the skin, the tick that spreads Lyme disease usually takes 24 hours before feeding begins. ? Even if a tick is attached, it must have taken a blood meal to transmit Lyme disease. At least 36 to 48 hours of feeding is required for a tick to have fed and then transmit the bacterium that causes Lyme disease. After this amount of time, the tick will be engorged (full of blood). An engorged tick has a globular shape and is larger than an unengorged one. ?It is not clear how long a tick needs to be attached to transmit bacteria other than Borrelia burgdorferi. UpToDate information:     Pregnant women -- We recommend doxycycline for the treatment of pregnant women with RMSF rather than chloramphenicol.  It should be administered as 100 mg twice daily (orally or intravenously). This approach is consistent with recommendations by the [facility] and the [facility] for Disease Control and Prevention, which reflect the opinion of experts in the field [16]. Chloramphenicol had traditionally been the preferred treatment for most pregnant women with RMSF. Tetracyclines were generally contraindicated in pregnancy because of the risk of hepatotoxicity in the mother [13] and adverse effects on fetal bone and teeth (eg, permanent discoloration of deciduous teeth from in utero exposure in the second and third trimesters [17], incorporation into fetal long tubular bones with transient inhibition of growth [18]). However, these events are extremely rare with doxycycline, and subsequent evidence has supported the relative safety of doxycycline compared with older tetracyclines in both pregnancy and in children [19,20]. As an example, in a systematic review, there was no correlation between the use of doxycycline and teratogenic effects during pregnancy or dental staining in children [19]. Thus, given the efficacy of doxycycline and the lack of other good alternatives, the benefits generally outweigh the risks. Children -- The dose of doxycycline for children who weigh ? 45 kg is 2.2 mg/kg/dose twice per day (maximum daily dose 200 mg) [13,21]. Children who weigh >45 kg should receive the adult dose. Tetracyclines can cause dental staining when administered to children younger than eight years. However, the risk of dental staining with doxycycline is minimal if a short course is administered [22], and in an observational study of 53 children who received approximately two courses of doxycycline for RMSF before they were eight years old, none developed dental staining in their permanent teeth [23]. Alternative agent -- Chloramphenicol is the only known alternative agent for the treatment of RMSF. To our knowledge, no other alternative agents have been tested in humans.  Although chloramphenicol has been found to have activity against RMSF in both animal and human studies [24-26], it appears to be less effective than doxycycline [5,21]. (See 'Doxycycline as preferred agent' above.)  Chloramphenicol is typically used in the rare setting when an individual has a history of a severe adverse reaction to doxycycline (eg, toxic epidermal necrolysis, severe hepatoxicity). The dose of chloramphenicol is 50 mg/kg per day in four divided doses (maximum 4 grams per day); dose reductions are required if used to treat neonates [27]. However, chloramphenicol may be difficult to rapidly obtain, and therefore, may not be a feasible option given the need to initiate therapy early in the course of disease. There is a low risk (1 in 25,000 to 40,000) of fatal aplastic anemia with chloramphenicol, and as a result, systemic use is restricted in many countries. If chloramphenicol cannot be obtained, doxycycline is the only studied treatment option. For individuals with a history of an adverse reaction to doxycycline, consultation with an allergist should be obtained, if possible, to help minimize the risk of an adverse event.

## 2018-05-16 NOTE — PROGRESS NOTES
S: [place] Ra is a 33 y.o. WHITE OR  female who presents for tick bite    Assessment/Plan:    1. Tick bite, initial encounter  -pt found tick embedded behind right knee on 5/10/18, not sure how long it was there  -3mm erythemateous papule, no edema or ecchymosis noted; no s/s of infection  - labs today: RMSF, Lyme, Ehrlichiosis  -pt is 6 1/2 months pregnant; gave pt a hard rx for doxy ([facility] recommends doxy for RMSF in pregnancy); if she gets any sx of tick illness, will  script and start abx, otherwise she will wait for lab results. 2. Low BP  -pt states it always runs low; discussed good hydration and s/s of hypotension     RTC pending lab results     HPI:  Establish care   6 1/2 months pregnant  Pregnancy going well - did have issue with placenta but turned out to be false alarm    Tick bite in back of right leg - attached - found it last Thursday   Has a flank back - has hx of kidney stones  Drank a lot of water and pain went away     Social history:   Nutrition: overall healthy, calcium source: milk, cheese, yogurt  Physical: biking, pilates, exercises videos  Social:lives with  and 16 month old son  Occupation: [employer] - 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      Review of Systems:  - Constitutional Symptoms: no fevers/chills  - Cardiovascular: no chest pain or palpitations  - Respiratory: no cough or shortness of breath  - Musculoskeletal: no joint pains or weakness  - Integumentary: no rashes    PHQ over the last two weeks 5/16/2018   PHQ Not Done -   Little interest or pleasure in doing things Not at all   Feeling down, depressed or hopeless Not at all   Total Score PHQ 2 0       I reviewed the following:  Past Medical History:   Diagnosis Date    Anemia     Routine Papanicolaou smear 07/20/2016    Negative (no hpv) from [facility] blistering        Current Outpatient Prescriptions   Medication Sig Dispense Refill    IRON, FERROUS SULFATE, PO Take  by mouth.  prenatal multivit-ca-min-fe-fa tab Take  by mouth. Allergies   Allergen Reactions    Amoxicillin Rash       O: VS:   Visit Vitals    BP (!) 87/51 (BP 1 Location: Left arm, BP Patient Position: Sitting)    Pulse 80    Temp 97.4 °F (36.3 °C) (Oral)    Resp 16    Ht 5' 3\" (1.6 m)    Wt 116 lb 6.4 oz (52.8 kg)    LMP 10/30/2017    SpO2 99%    BMI 20.62 kg/m2     GENERAL: Marion Jarrell is in no acute distress. Non-toxic. Well nourished. Well developed. Appropriately groomed. HEAD:  Normocephalic. Atraumatic. Non tender sinuses x 4. RESP: Breath sounds are symmetrical bilaterally. Unlabored without SOB. Speaking in full sentences. Clear to auscultation bilaterally anteriorly and posteriorly. No wheezes. No rales or rhonchi. CV: normal rate. Regular rhythm. S1, S2 audible. No murmur noted. No rubs, clicks or gallops noted. NEURO:  awake, alert and oriented to person, place, and time and event. Cranial nerves II through XII intact. Clear speech. Muscle strength is +5/5 x 4 extremities. Sensation is intact to light touch bilaterally. Steady gait. MUSC:  Intact x 4 extremities. Full ROM x 4 extremities. No pain with movement. HEME/LYMPH: peripheral pulses palpable 2+ x 4 extremities. No peripheral edema is noted. SKIN: Skin is warm and dry. Turgor is normal. No petechiae, no purpura, no rash. No cyanosis. No mottling, jaundice or pallor. Back of Right knee -3mm erythemateous papule, no edema or ecchymosis noted. PSYCH: appropriate behavior, dress and thought processes. Good eye contact. Clear and coherent speech. Full affect. Good insight.   ______________________________________________________________________  Patient education was done. Advised on nutrition, physical activity, weight management, tobacco, alcohol and safety.   Counseling included discussion of diagnosis, differentials, treatment options, prescribed treatment, warning signs and follow up. Medication risks/benefits,interactions and alternatives discussed with patient.      Patient verbalized understanding and agreed to plan of care. Patient was given an after visit summary which included current diagnoses, medications and vital signs. Follow up as directed.

## 2018-05-16 NOTE — PROGRESS NOTES
Patient is a new patient that want to be establish. States last week she got a tick bite that she want to get checked out. States she is 6.5 months pregnant. 1. Have you been to the ER, urgent care clinic since your last visit? Hospitalized since your last visit? No    2. Have you seen or consulted any other health care providers outside of the [address] [place] [address] since your last visit? Include any pap smears or colon screening.  No

## 2018-05-17 ENCOUNTER — PATIENT OUTREACH (OUTPATIENT)
Dept: OTHER | Age: 33
End: 2018-05-17

## 2018-05-17 LAB
A PHAGOCYTOPH IGG TITR SER IF: NEGATIVE {TITER}
A PHAGOCYTOPH IGM TITR SER IF: NEGATIVE {TITER}
B BURGDOR IGG+IGM SER-ACNC: <0.91 ISR (ref 0–0.9)
E CHAFFEENSIS IGG TITR SER IF: NEGATIVE {TITER}
E CHAFFEENSIS IGM TITR SER IF: NEGATIVE {TITER}
R RICKETTSI IGG SER QL IA: NEGATIVE

## 2018-05-17 NOTE — PROGRESS NOTES
9:39a - Telephonic attempt to contact patient for f/u regarding new PCP appt. Robyn Hernandez discreet message on voicemail with this CC contact information. 11:30a - Return call from patient. Verified  and zip code with patient as identifiers. *Patient reports that her appointment went very well. Was very happy with the provider that she saw. Andrew Han were very thorough. I will definitely go back. \"    Chart Review: PCP Visit - 18: Anthony Sawant NP    Assessment/Plan:     1. Tick bite, initial encounter  -pt found tick embedded behind right knee on 5/10/18, not sure how long it was there  -3mm erythemateous papule, no edema or ecchymosis noted; no s/s of infection  - labs today: RMSF, Lyme, Ehrlichiosis  -pt is 6 1/2 months pregnant; gave pt a hard rx for doxy ([facility]D recommends doxy for RMSF in pregnancy); if she gets any sx of tick illness, will  script and start abx, otherwise she will wait for lab results.     2. Low BP  -pt states it always runs low; discussed good hydration and s/s of hypotension      RTC pending lab results    Has  flank pain - back - has hx of kidney stones  Drank a lot of water and pain went away     Patient was very thankful for assistance in finding a PCP. No other questions or concerns. Patient has contact information and reminded her I can be reached if needs arise in the future.

## 2018-05-18 ENCOUNTER — TELEPHONE (OUTPATIENT)
Dept: FAMILY MEDICINE CLINIC | Age: 33
End: 2018-05-18

## 2018-05-18 NOTE — TELEPHONE ENCOUNTER
----- Message from Yue Cervantes sent at 5/17/2018  3:46 PM EDT -----  Regarding: MELISSA Hudson/Telephone  Pt is returning a call    Best contact for the pt is [phone]

## 2018-05-19 ENCOUNTER — HOSPITAL ENCOUNTER (EMERGENCY)
Age: 33
Discharge: HOME | End: 2018-05-19
Attending: OBSTETRICS & GYNECOLOGY | Admitting: OBSTETRICS & GYNECOLOGY
Payer: COMMERCIAL

## 2018-05-19 VITALS — BODY MASS INDEX: 20.55 KG/M2 | HEIGHT: 63 IN | WEIGHT: 116 LBS

## 2018-05-19 PROCEDURE — 99283 EMERGENCY DEPT VISIT LOW MDM: CPT

## 2018-05-19 NOTE — IP AVS SNAPSHOT
[address] 
[phone] Patient: Swapnil Lopez MRN: [id number] HPK:3/88/2003 About your hospitalization You were admitted on:  N/A You last received care in the:  [facility] 3 LABOR & DELIVERY You were discharged on:  May 19, 2018 Why you were hospitalized Your primary diagnosis was:  Not on File Your diagnoses also included:  Pregnancy Follow-up Information Follow up With Details Comments Contact Info [address], MD   [address] [place] [address] [address] [place] [address] 
[phone] Your Scheduled Appointments  10:00 AM EDT  
OB VISIT with [address], [place]  
[place] ([facility]) [address] [place] [address] [address] [facility]  
[phone] 2018  8:00 AM EDT FOLLOW-UP OB ULTRASOUND with ULTRASOUND 3 [facility]  CENTER ([address]) [address] [place] [address] [address] [facility]  
[phone] 2018 10:00 AM EDT  
OB VISIT with [address], [place]  
[place] ([address]) [address] [place] [address] [address] [facility]  
[phone] Discharge Orders None A check david indicates which time of day the medication should be taken. My Medications ASK your doctor about these medications Instructions Each Dose to Equal  
 Morning Noon Evening Bedtime  
 doxycycline 100 mg tablet Commonly known as:  ADOXA Your last dose was: Your next dose is: Take 1 Tab by mouth two (2) times a day for 10 days. 100 mg  
    
   
   
   
  
 IRON (FERROUS SULFATE) PO Your last dose was: Your next dose is: Take  by mouth.  
     
   
   
   
  
 prenatal multivit-ca-min-fe-fa Tab Your last dose was: Your next dose is: Take  by mouth. Discharge Instructions DECREASED FETAL MOVEMENT DISCHARGE INSTRUCTIONS Name: Chastity Irvin YOB: 1985 Primary Diagnosis: Active Problems: 
  Pregnancy (1/29/2018) Overview: - Unreliable LMP, US for dating 
    - US-  shows viable IUP 7+2 -> CHE[place]=8/28/18, nl YS, nl CM. Does show [facility] 
    - US (1/25/18) 9+1 -> CHESTER=8/29/18 
    - EOB ur cx contam -> rpt ur cx 50-100K enterococcus -> Macrobid, PANKAJ  
    (3/19) neg - MSAFP low risk. Declines aneuploidy. 
    - US (4/12/18) 20+1 @ 20+2. Fetal anatomy nl (does not want to know  
    gender). Placenta fundal, appears thickened in some images (?artifact) ->  
    MFM 
    - MFM US (4/17/18) placenta appears thickened in some views (6-7mm), but  
    probably d/t left lat location; o/w appears nl -> rpt 8-10wks Introduction: You came to the hospital because your baby is not moving as much as usual. This information may help you decide when you need to call your care provider and return to the hospital. There are several possible reasons your baby's movements have decreased. Sometimes, the baby is simply asleep. Many times the baby simply needs extra fluids. You can provide this by lying down on your side (to increase blood flow to the womb) and drinking a large glass of fluid. If this does not make the baby move four times in one hour, you need to contact your care provider as soon as possible. General:  
 
Drink at least 2 liters of water throughout the day Diet/Diet Restrictions:   
 
Anything you like/tolerate and Eat fresh vegetables, fruits, bran cereal to avoid becoming constipated. Physical Activity / Restrictions / Safety: As tolerated.  
Other:  
    
Discharge Instructions/ Special Treatment/ Home Care Needs:  
 
Call your provider if: 
? Your baby is not moving as much as usual-at least 4 fetal movements in 1 hour after drinking and resting on your side for 1 hour. ? You have regular, painful contractions every 5 minutes or less for one hour. Time your contractions from the beginning of one to the beginning of the next. ? You have a gush of fluid or blood from your vagina (it is normal to have spotting after vaginal exam or intercourse). ? Other:  
 labor instructions:  
? Uterine cramping (menstrual-like cramps, intermittent or constant ? Uterine contractions every 10-15 minutes or more frequently ? Low abdominal pressure (pelvic pressure) ? Dull low backache (intermittent or constant) ? Increase or change in vaginal discharge ? Feeling that the baby is \"pushing down\" ? Abdominal cramping with or without diarrhea If any of these symptoms are experienced, stop what you are doing, lie down on your side, drink two to three glasses of water and wait one hour. If the symptoms persist or get worse, call your provider. Pain Management:  
 
 
 
 
 
Signed By: Gaston Remy RN                                                                                                   Date: 2018 Time: 9:00 PM 
 
Discharge Checklist-NURSING TO COMPLETE:  
 
Date and Time of Discharge: Date: 2018 Time: 9:00 PM 
 
Return of:  
Dental Appliance:   
Vision:   
Hearing Aid:   
Jewelry:   
Clothing:   
Other Valuables:   
Valuables sent to safe:   
 
Prescription Given: no 
Medication Instruction Sheet(s), including side effects, provided: no 
 
Accompanied By: pt drove herself here. Mode of Transportation: car Discharge Disposition: Home I have had the opportunity to make my options or choices for discharge. I have received and understand these instructions. Weeks 26 to 30 of Your Pregnancy: Care Instructions Your Care Instructions You are now in your last trimester of pregnancy. Your baby is growing rapidly. And you'll probably feel your baby moving around more often.  Your doctor may ask you to count your baby's kicks. Your back may ache as your body gets used to your baby's size and length. If you haven't already had the Tdap shot during this pregnancy, talk to your doctor about getting it. It will help protect your  against pertussis infection. During this time, it's important to take care of yourself and pay attention to what your body needs. If you feel sexual, explore ways to be close with your partner that match your comfort and desire. Use the tips provided in this care sheet to find ways to be sexual in your own way. Follow-up care is a key part of your treatment and safety. Be sure to make and go to all appointments, and call your doctor if you are having problems. It's also a good idea to know your test results and keep a list of the medicines you take. How can you care for yourself at home? Take it easy at work · Take frequent breaks. If possible, stop working when you are tired, and rest during your lunch hour. · Take bathroom breaks every 2 hours. · Change positions often. If you sit for long periods, stand up and walk around. · When you stand for a long time, keep one foot on a low stool with your knee bent. After standing a lot, sit with your feet up. · Avoid fumes, chemicals, and tobacco smoke. Be sexual in your own way · Having sex during pregnancy is okay, unless your doctor tells you not to. · You may be very interested in sex, or you may have no interest at all. · Your growing belly can make it hard to find a good position during intercourse. [place] and explore. · You may get cramps in your uterus when your partner touches your breasts. · A back rub may relieve the backache or cramps that sometimes follow orgasm. Learn about  labor · Watch for signs of  labor. You may be going into labor if: 
¨ You have menstrual-like cramps, with or without nausea. ¨ You have about 4 or more contractions in 20 minutes, or about 8 or more within 1 hour, even after you have had a glass of water and are resting. ¨ You have a low, dull backache that does not go away when you change your position. ¨ You have pain or pressure in your pelvis that comes and goes in a pattern. ¨ You have intestinal cramping or flu-like symptoms, with or without diarrhea. ¨ You notice an increase or change in your vaginal discharge. Discharge may be heavy, mucus-like, watery, or streaked with blood. ¨ Your water breaks. · If you think you have  labor: ¨ Drink 2 or 3 glasses of water or juice. Not drinking enough fluids can cause contractions. ¨ Stop what you are doing, and empty your bladder. Then lie down on your left side for at least 1 hour. ¨ While lying on your side, find your breast bone. Put your fingers in the soft spot just below it. Move your fingers down toward your belly button to find the top of your uterus. Check to see if it is tight. ¨ Contractions can be weak or strong. Record your contractions for an hour. Time a contraction from the start of one contraction to the start of the next one. ¨ Single or several strong contractions without a pattern are called C[place]oula-Velasquez contractions. They are practice contractions but not the start of labor. They often stop if you change what you are doing. ¨ Call your doctor if you have regular contractions. Where can you learn more? Go to http://marlen-elly.info/. Enter N250 in the search box to learn more about \"Weeks 26 to 30 of Your Pregnancy: Care Instructions. \" Current as of: 2017 Content Version: 11.4 © 4245-4292 Bedloo. Care instructions adapted under license by [employer] (which disclaims liability or warranty for this information).  If you have questions about a medical condition or this instruction, always ask your healthcare professional. Climmie [employer] disclaims any warranty or liability for your use of this information. Introducing [facility] & HEALTH SERVICES! Dear Mesfin Pimentel: Thank you for requesting a [employer] account. Our records indicate that you already have an active [employer] account. You can access your account anytime at [email or web address] Did you know that you can access your hospital and ER discharge instructions at any time in [employer]? You can also review all of your test results from your hospital stay or ER visit. Additional Information If you have questions, please visit the Frequently Asked Questions section of the [employer] website at [email or web address] Remember, [employer] is NOT to be used for urgent needs. For medical emergencies, dial 911. Now available from your iPhone and Android! Introducing Allen Camarena As a [facility] patient, I wanted to make you aware of our electronic visit tool called Allen Camarena. [facility] 24/Enigmatec allows you to connect within minutes with a medical provider 24 hours a day, seven days a week via a mobile device or tablet or logging into a secure website from your computer. You can access Allen Camarena from anywhere in the [place]. A virtual visit might be right for you when you have a simple condition and feel like you just dont want to get out of bed, or cant get away from work for an appointment, when your regular [facility] provider is not available (evenings, weekends or holidays), or when youre out of town and need minor care. Electronic visits cost only $49 and if the [facility] 24/7 provider determines a prescription is needed to treat your condition, one can be electronically transmitted to a nearby pharmacy*. Please take a moment to enroll today if you have not already done so. The enrollment process is free and takes just a few minutes.   To enroll, please download the [facility] 24/7 francis to your tablet or phone, or visit [email or web address] to enroll on your computer. And, as an [address] [place] [address] patient with a [employer] account, the results of your visits will be scanned into your electronic medical record and your primary care provider will be able to view the scanned results. We urge you to continue to see your regular [facility] provider for your ongoing medical care. And while your primary care provider may not be the one available when you seek a [email or web address] virtual visit, the peace of mind you get from getting a real diagnosis real time can be priceless. For more information on [email or web address], view our Frequently Asked Questions (FAQs) at [email or web address]."[email or web address]. Sincerely, 
 
Meliza Rodríguez MD 
Chief Medical Officer [facility] *:  certain medications cannot be prescribed via [email or web address] Providers Seen During Your Hospitalization Provider Specialty Primary office phone Jose Hardy MD Obstetrics & Gynecology [phone] Your Primary Care Physician (PCP) Primary Care Physician Office Phone Office Fax Becky, Kat [facility] [phone] You are allergic to the following Allergen Reactions Amoxicillin Rash Recent Documentation Height Weight BMI OB Status Smoking Status 1.6 m 52.6 kg 20.55 kg/m2 Pregnant Never Smoker Emergency Contacts Name Discharge Info Relation Home Work Mobile Joel Russell DISCHARGE CAREGIVER [3] Spouse [3] [phone] [address]  Parent [1] [phone] Patient Belongings The following personal items are in your possession at time of discharge: 
                             
 
  
  
 Please provide this summary of care documentation to your next provider.  
  
  
 
  
Signatures-by signing, you are acknowledging that this After Visit Summary has been reviewed with you and you have received a copy. Patient Signature:  ____________________________________________________________ Date:  ____________________________________________________________  
  
Lyubov McCormick Provider Signature:  ____________________________________________________________ Date:  ____________________________________________________________

## 2018-05-19 NOTE — IP AVS SNAPSHOT
[address] 
[phone] Patient: Vy Weiner MRN: [id number] OVA:9/78/4684 A check david indicates which time of day the medication should be taken. My Medications ASK your doctor about these medications Instructions Each Dose to Equal  
 Morning Noon Evening Bedtime  
 doxycycline 100 mg tablet Commonly known as:  ADOXA Your last dose was: Your next dose is: Take 1 Tab by mouth two (2) times a day for 10 days. 100 mg  
    
   
   
   
  
 IRON (FERROUS SULFATE) PO Your last dose was: Your next dose is: Take  by mouth.  
     
   
   
   
  
 prenatal multivit-ca-min-fe-fa Tab Your last dose was: Your next dose is: Take  by mouth.

## 2018-05-20 NOTE — DISCHARGE INSTRUCTIONS
DECREASED FETAL MOVEMENT DISCHARGE INSTRUCTIONS    Name: Carlos Merida  YOB: 1985  Primary Diagnosis: Active Problems:    Pregnancy (1/29/2018)      Overview: - Unreliable LMP, US for dating      - US-  shows viable IUP 7+2 -> CHE[place]=8/28/18, nl YS, nl CM. Does show [facility]      - US (1/25/18) 9+1 -> CHESTER=8/29/18      - EOB ur cx contam -> rpt ur cx 50-100K enterococcus -> Macrobid, PANKAJ       (3/19) neg      - MSAFP low risk. Declines aneuploidy.      - US (4/12/18) 20+1 @ 20+2. Fetal anatomy nl (does not want to know       gender). Placenta fundal, appears thickened in some images (?artifact) ->       MFM      - MFM US (4/17/18) placenta appears thickened in some views (6-7mm), but       probably d/t left lat location; o/w appears nl -> rpt 8-10wks        Introduction: You came to the hospital because your baby is not moving as much as usual. This information may help you decide when you need to call your care provider and return to the hospital. There are several possible reasons your baby's movements have decreased. Sometimes, the baby is simply asleep. Many times the baby simply needs extra fluids. You can provide this by lying down on your side (to increase blood flow to the womb) and drinking a large glass of fluid. If this does not make the baby move four times in one hour, you need to contact your care provider as soon as possible. General:     Drink at least 2 liters of water throughout the day    Diet/Diet Restrictions:      Anything you like/tolerate and Eat fresh vegetables, fruits, bran cereal to avoid becoming constipated. Physical Activity / Restrictions / Safety:     As tolerated. Other:        Discharge Instructions/ Special Treatment/ Home Care Needs:     Call your provider if:   Your baby is not moving as much as usual-at least 4 fetal movements in 1 hour after drinking and resting on your side for 1 hour.    You have regular, painful contractions every 5 minutes or less for one hour. Time your contractions from the beginning of one to the beginning of the next.  You have a gush of fluid or blood from your vagina (it is normal to have spotting after vaginal exam or intercourse).  Other:    labor instructions:    Uterine cramping (menstrual-like cramps, intermittent or constant   Uterine contractions every 10-15 minutes or more frequently   Low abdominal pressure (pelvic pressure)   Dull low backache (intermittent or constant)   Increase or change in vaginal discharge   Feeling that the baby is \"pushing down\"   Abdominal cramping with or without diarrhea  If any of these symptoms are experienced, stop what you are doing, lie down on your side, drink two to three glasses of water and wait one hour. If the symptoms persist or get worse, call your provider. Pain Management:             Signed By: Martin Robbins RN                                                                                                   Date: 2018 Time: 9:00 PM    Discharge Checklist-NURSING TO COMPLETE:     Date and Time of Discharge: Date: 2018 Time: 9:00 PM    Return of:   Dental Appliance:    Vision:    Hearing Aid:    Jewelry:    Clothing:    Other Valuables:    Valuables sent to safe:      Prescription Given: no  Medication Instruction Sheet(s), including side effects, provided: no    Accompanied By: pt drove herself here. Mode of Transportation: car    Discharge Disposition: Home    I have had the opportunity to make my options or choices for discharge. I have received and understand these instructions. Weeks 26 to 30 of Your Pregnancy: Care Instructions  Your Care Instructions    You are now in your last trimester of pregnancy. Your baby is growing rapidly. And you'll probably feel your baby moving around more often. Your doctor may ask you to count your baby's kicks. Your back may ache as your body gets used to your baby's size and length.   If you haven't already had the Tdap shot during this pregnancy, talk to your doctor about getting it. It will help protect your  against pertussis infection. During this time, it's important to take care of yourself and pay attention to what your body needs. If you feel sexual, explore ways to be close with your partner that match your comfort and desire. Use the tips provided in this care sheet to find ways to be sexual in your own way. Follow-up care is a key part of your treatment and safety. Be sure to make and go to all appointments, and call your doctor if you are having problems. It's also a good idea to know your test results and keep a list of the medicines you take. How can you care for yourself at home? Take it easy at work  · Take frequent breaks. If possible, stop working when you are tired, and rest during your lunch hour. · Take bathroom breaks every 2 hours. · Change positions often. If you sit for long periods, stand up and walk around. · When you stand for a long time, keep one foot on a low stool with your knee bent. After standing a lot, sit with your feet up. · Avoid fumes, chemicals, and tobacco smoke. Be sexual in your own way  · Having sex during pregnancy is okay, unless your doctor tells you not to. · You may be very interested in sex, or you may have no interest at all. · Your growing belly can make it hard to find a good position during intercourse. Madeline and explore. · You may get cramps in your uterus when your partner touches your breasts. · A back rub may relieve the backache or cramps that sometimes follow orgasm. Learn about  labor  · Watch for signs of  labor. You may be going into labor if:  ¨ You have menstrual-like cramps, with or without nausea. ¨ You have about 4 or more contractions in 20 minutes, or about 8 or more within 1 hour, even after you have had a glass of water and are resting.   ¨ You have a low, dull backache that does not go away when you change your position. ¨ You have pain or pressure in your pelvis that comes and goes in a pattern. ¨ You have intestinal cramping or flu-like symptoms, with or without diarrhea. ¨ You notice an increase or change in your vaginal discharge. Discharge may be heavy, mucus-like, watery, or streaked with blood. ¨ Your water breaks. · If you think you have  labor:  ¨ Drink 2 or 3 glasses of water or juice. Not drinking enough fluids can cause contractions. ¨ Stop what you are doing, and empty your bladder. Then lie down on your left side for at least 1 hour. ¨ While lying on your side, find your breast bone. Put your fingers in the soft spot just below it. Move your fingers down toward your belly button to find the top of your uterus. Check to see if it is tight. ¨ Contractions can be weak or strong. Record your contractions for an hour. Time a contraction from the start of one contraction to the start of the next one. ¨ Single or several strong contractions without a pattern are called Marco-Velasquez contractions. They are practice contractions but not the start of labor. They often stop if you change what you are doing. ¨ Call your doctor if you have regular contractions. Where can you learn more? Go to http://marlen-elly.info/. Enter R648 in the search box to learn more about \"Weeks 26 to 30 of Your Pregnancy: Care Instructions. \"  Current as of: 2017  Content Version: 11.4  © 6224-4999 payleven. Care instructions adapted under license by [employer] (which disclaims liability or warranty for this information). If you have questions about a medical condition or this instruction, always ask your healthcare professional. [address] any warranty or liability for your use of this information.

## 2018-05-20 NOTE — PROGRESS NOTES
2026 - Pt arrived on Unit on foot (drove self here), to room 12, for complaint of decreased fetal movement since about 0930 today. She states it has been a restful day and that yesterday seemed \"normal,\" in terms of fetal movement and her own activities. She denies vaginal bleeding or leakage of fluid. Denies falling or abdominal trauma. She has tried drinking more water, eating, and resting on her side/laying down at home. Pt denies abdominal cramping/pressure/contractions. 2033 -  and movement is audible on monitor. Pt states she feels \"a little bit. \"    2054 - Dr Heather Goyal has been notified of pt status/complaints and subsequent positive fetal movement since arriving here, and reassuring FHR. He is now at bedside to assess pt and view monitoring strip. MD states pt may be discharged home. She states her next appointment is in about 2 weeks and she verbalizes understanding that she should call or return for evaluation if still concerned about decreased movement.

## 2018-05-20 NOTE — H&P
History & Physical    Name: Aixa Cooper MRN: [id number]  SSN: [id number]    YOB: 1985  Age: 33 y.o. Sex: female      Subjective:     Reason for Admission:  Pregnancy and decreased fetal movements    History of Present Illness: Aixa Cooper is a 33 y.o.  female with an estimated gestational age of 21w3d with Estimated Date of Delivery: 18. Patient complains of decreased fetal movement for 1 days. Pregnancy has been complicated by none. Patient denies abdominal pain  , chest pain, contractions, fever, headache , nausea and vomiting, pelvic pressure, right upper quadrant pain  , shortness of breath, swelling, vaginal bleeding , vaginal leaking of fluid  and visual disturbances. OB History      Para Term  AB Living    2 1 1   1    SAB TAB Ectopic Molar Multiple Live Births        0 1        Past Medical History:   Diagnosis Date    Anemia     During pregnancy, taking Iron    Routine Papanicolaou smear 2016    Negative (no hpv) from [address]    Sunburn, blistering      Past Surgical History:   Procedure Laterality Date    HX CHOLECYSTECTOMY  2010    HX OTHER SURGICAL      cholecystectomy    HX WISDOM TEETH EXTRACTION       Social History     Occupational History    Not on file. Social History Main Topics    Smoking status: Never Smoker    Smokeless tobacco: Never Used      Comment: Never used vapor or e-cigs     Alcohol use No    Drug use: No    Sexual activity: Yes     Partners: Male     Birth control/ protection: None     Family History   Problem Relation Age of Onset    Thyroid Disease Mother     Cancer Father      Prostate     Thyroid Disease Maternal Grandmother     Thyroid Disease Other      6 Siblings of Mother h/o Hyperthyroidism        Allergies   Allergen Reactions    Amoxicillin Rash     Prior to Admission medications    Medication Sig Start Date End Date Taking?  Authorizing Provider   IRON, FERROUS SULFATE, PO Take  by mouth.   Yes Historical Provider   prenatal multivit-ca-min-fe-fa tab Take  by mouth. Yes Historical Provider   doxycycline (ADOXA) 100 mg tablet Take 1 Tab by mouth two (2) times a day for 10 days. 5/16/18 5/26/18  Anthony Sawant NP        Review of Systems    Objective:     Vitals:    Vitals:    05/19/18 2043   Weight: 52.6 kg (116 lb)   Height: 5' 3\" (1.6 m)      No data recorded. No Data Recorded     Physical Exam    Cervical Exam: deferred  Uterine Activity: None  Membranes: Intact  Fetal Heart Rate: Baseline: 120 per minute  Variability: moderate  Accelerations: no  Decelerations: none  Uterine contractions: none       Labs: No results found for this or any previous visit (from the past 24 hour(s)).     Patient Active Problem List   Diagnosis Code    Pregnancy Z34.90     Assessment and Plan:     IUP 2 25 WEEKS  dECREASED FETAL MOVEMENTS NOW BETTER.  dISCHARGE HOME'  fOLLOW UP IN CLINIC  rtc prN      Signed By:  Marry Shelley MD     May 19, 2018                 anna

## 2018-06-07 ENCOUNTER — ROUTINE PRENATAL (OUTPATIENT)
Dept: OBGYN CLINIC | Age: 33
End: 2018-06-07

## 2018-06-07 VITALS
HEART RATE: 85 BPM | SYSTOLIC BLOOD PRESSURE: 93 MMHG | BODY MASS INDEX: 21.44 KG/M2 | DIASTOLIC BLOOD PRESSURE: 55 MMHG | WEIGHT: 121 LBS | HEIGHT: 63 IN

## 2018-06-07 DIAGNOSIS — Z34.83 ENCOUNTER FOR SUPERVISION OF OTHER NORMAL PREGNANCY IN THIRD TRIMESTER: Primary | ICD-10-CM

## 2018-06-07 NOTE — PROGRESS NOTES
+FM (was seen on L&D few wks ago for decr FM). Declines TDAP (had high fever in past). Some lower abd pressure, no ctx.  consent today. Has MFM US appt libby.

## 2018-06-07 NOTE — PATIENT INSTRUCTIONS
Weeks 26 to 30 of Your Pregnancy: Care Instructions  Your Care Instructions    You are now in your last trimester of pregnancy. Your baby is growing rapidly. And you'll probably feel your baby moving around more often. Your doctor may ask you to count your baby's kicks. Your back may ache as your body gets used to your baby's size and length. If you haven't already had the Tdap shot during this pregnancy, talk to your doctor about getting it. It will help protect your  against pertussis infection. During this time, it's important to take care of yourself and pay attention to what your body needs. If you feel sexual, explore ways to be close with your partner that match your comfort and desire. Use the tips provided in this care sheet to find ways to be sexual in your own way. Follow-up care is a key part of your treatment and safety. Be sure to make and go to all appointments, and call your doctor if you are having problems. It's also a good idea to know your test results and keep a list of the medicines you take. How can you care for yourself at home? Take it easy at work  · Take frequent breaks. If possible, stop working when you are tired, and rest during your lunch hour. · Take bathroom breaks every 2 hours. · Change positions often. If you sit for long periods, stand up and walk around. · When you stand for a long time, keep one foot on a low stool with your knee bent. After standing a lot, sit with your feet up. · Avoid fumes, chemicals, and tobacco smoke. Be sexual in your own way  · Having sex during pregnancy is okay, unless your doctor tells you not to. · You may be very interested in sex, or you may have no interest at all. · Your growing belly can make it hard to find a good position during intercourse. [place] and explore. · You may get cramps in your uterus when your partner touches your breasts.   · A back rub may relieve the backache or cramps that sometimes follow orgasm. Learn about  labor  · Watch for signs of  labor. You may be going into labor if:  ¨ You have menstrual-like cramps, with or without nausea. ¨ You have about 4 or more contractions in 20 minutes, or about 8 or more within 1 hour, even after you have had a glass of water and are resting. ¨ You have a low, dull backache that does not go away when you change your position. ¨ You have pain or pressure in your pelvis that comes and goes in a pattern. ¨ You have intestinal cramping or flu-like symptoms, with or without diarrhea. ¨ You notice an increase or change in your vaginal discharge. Discharge may be heavy, mucus-like, watery, or streaked with blood. ¨ Your water breaks. · If you think you have  labor:  ¨ Drink 2 or 3 glasses of water or juice. Not drinking enough fluids can cause contractions. ¨ Stop what you are doing, and empty your bladder. Then lie down on your left side for at least 1 hour. ¨ While lying on your side, find your breast bone. Put your fingers in the soft spot just below it. Move your fingers down toward your belly button to find the top of your uterus. Check to see if it is tight. ¨ Contractions can be weak or strong. Record your contractions for an hour. Time a contraction from the start of one contraction to the start of the next one. ¨ Single or several strong contractions without a pattern are called Marco-Velasquez contractions. They are practice contractions but not the start of labor. They often stop if you change what you are doing. ¨ Call your doctor if you have regular contractions. Where can you learn more? Go to http://marlen-elly.info/. Enter N107 in the search box to learn more about \"Weeks 26 to 30 of Your Pregnancy: Care Instructions. \"  Current as of: 2017  Content Version: 11.4  © 7611-7661 [employer].  Care instructions adapted under license by [employer] (which disclaims liability or warranty for this information). If you have questions about a medical condition or this instruction, always ask your healthcare professional. [place] [address] any warranty or liability for your use of this information.

## 2018-06-19 ENCOUNTER — ROUTINE PRENATAL (OUTPATIENT)
Dept: OBGYN CLINIC | Age: 33
End: 2018-06-19

## 2018-06-19 ENCOUNTER — HOSPITAL ENCOUNTER (OUTPATIENT)
Dept: PERINATAL CARE | Age: 33
Discharge: HOME | End: 2018-06-19
Attending: OBSTETRICS & GYNECOLOGY
Payer: COMMERCIAL

## 2018-06-19 VITALS
SYSTOLIC BLOOD PRESSURE: 86 MMHG | HEIGHT: 63 IN | WEIGHT: 123 LBS | DIASTOLIC BLOOD PRESSURE: 50 MMHG | HEART RATE: 78 BPM | RESPIRATION RATE: 19 BRPM | BODY MASS INDEX: 21.79 KG/M2

## 2018-06-19 DIAGNOSIS — R10.9 FLANK PAIN: ICD-10-CM

## 2018-06-19 DIAGNOSIS — Z34.83 ENCOUNTER FOR SUPERVISION OF OTHER NORMAL PREGNANCY IN THIRD TRIMESTER: Primary | ICD-10-CM

## 2018-06-19 LAB — URINALYSIS, EXTERNAL: NEGATIVE

## 2018-06-19 PROCEDURE — 76816 OB US FOLLOW-UP PER FETUS: CPT | Performed by: OBSTETRICS & GYNECOLOGY

## 2018-06-19 NOTE — PROGRESS NOTES
MFM US today: 30+5 @ 30+0. 1655gm (63%). HANNAH=25.4. Placenta thick in some views (11cm), prob nl variant -> rpt 4wks (7/20 0800). Some right flank pain, has h/o kidney stones, thinks d/t not hydrating enough; PE: no CVAT or flank tenderness -> ur cx; enc H2O, renal US if persists. RTO 2wks        - Unreliable LMP, US for dating  - US-  shows viable IUP 7+2 -> CHESTER=8/28/18, nl YS, nl CM. Does show [facility]  - US (1/25/18) 9+1 -> CHESTER=8/29/18  - EOB ur cx contam -> rpt ur cx 50-100K enterococcus -> Macrobid, PANKAJ (3/19) neg  - MSAFP low risk. Declines aneuploidy.  - US (4/12/18) 20+1 @ 20+2. Fetal anatomy nl (does not want to know gender). Placenta fundal, appears thickened in some images (?artifact) -> MFM  - MFM US (4/17/18) placenta appears thickened in some views (6-7mm), but probably d/t left lat location; o/w appears nl  - MFM US (6/19/18) 30+5 @ 30+0. 1655gm (63%). HANNAH=25.4. Placenta thick in some views (11cm), prob nl variant -> rpt 4wks.   - declines TDAP (had high fever to 103 with prev vacc)

## 2018-06-21 LAB — BACTERIA UR CULT: NORMAL

## 2018-06-30 ENCOUNTER — OFFICE VISIT (OUTPATIENT)
Dept: URGENT CARE | Age: 33
End: 2018-06-30

## 2018-06-30 VITALS
WEIGHT: 123 LBS | HEIGHT: 63 IN | HEART RATE: 92 BPM | TEMPERATURE: 97.6 F | OXYGEN SATURATION: 99 % | DIASTOLIC BLOOD PRESSURE: 51 MMHG | BODY MASS INDEX: 21.79 KG/M2 | SYSTOLIC BLOOD PRESSURE: 98 MMHG | RESPIRATION RATE: 18 BRPM

## 2018-06-30 DIAGNOSIS — R19.7 DIARRHEA, UNSPECIFIED TYPE: ICD-10-CM

## 2018-06-30 DIAGNOSIS — Z3A.32 32 WEEKS GESTATION OF PREGNANCY: ICD-10-CM

## 2018-06-30 DIAGNOSIS — R11.2 NON-INTRACTABLE VOMITING WITH NAUSEA, UNSPECIFIED VOMITING TYPE: Primary | ICD-10-CM

## 2018-06-30 NOTE — PATIENT INSTRUCTIONS
Please call your OBGYN on call and let them know you were evaluated today. Suspect viral gastro enteritis based on family members symptoms  Fetal heart tones were within normal limits today  Please maintain/increase fluid intake today. If you have any return of vomiting, new or worsening, please go to [facility] ED immediatly    Gastroenteritis: Care Instructions  Your Care Instructions    Gastroenteritis is an illness that may cause nausea, vomiting, and diarrhea. It is sometimes called \"stomach flu. \" It can be caused by bacteria or a virus. You will probably begin to feel better in 1 to 2 days. In the meantime, get plenty of rest and make sure you do not become dehydrated. Dehydration occurs when your body loses too much fluid. Follow-up care is a key part of your treatment and safety. Be sure to make and go to all appointments, and call your doctor if you are having problems. It's also a good idea to know your test results and keep a list of the medicines you take. How can you care for yourself at home? · If your doctor prescribed antibiotics, take them as directed. Do not stop taking them just because you feel better. You need to take the full course of antibiotics. · Drink plenty of fluids to prevent dehydration, enough so that your urine is light yellow or clear like water. Choose water and other caffeine-free clear liquids until you feel better. If you have kidney, heart, or liver disease and have to limit fluids, talk with your doctor before you increase your fluid intake. · Drink fluids slowly, in frequent, small amounts, because drinking too much too fast can cause vomiting. · Begin eating mild foods, such as dry toast, yogurt, applesauce, bananas, and rice. Avoid spicy, hot, or high-fat foods, and do not drink alcohol or caffeine for a day or two. Do not drink milk or eat ice cream until you are feeling better. How to prevent gastroenteritis  · Keep hot foods hot and cold foods cold.   · Do not eat meats, dressings, salads, or other foods that have been kept at room temperature for more than 2 hours. · Use a thermometer to check your refrigerator. It should be between 34°F and 40°F.  · Defrost meats in the refrigerator or microwave, not on the kitchen counter. · Keep your hands and your kitchen clean. Wash your hands, cutting boards, and countertops with hot soapy water frequently. · Cook meat until it is well done. · Do not eat raw eggs or uncooked sauces made with raw eggs. · Do not take chances. If food looks or tastes spoiled, throw it out. When should you call for help? Call 911 anytime you think you may need emergency care. For example, call if:  ? · You vomit blood or what looks like coffee grounds. ? · You passed out (lost consciousness). ? · You pass maroon or very bloody stools. ?Call your doctor now or seek immediate medical care if:  ? · You have severe belly pain. ? · You have signs of needing more fluids. You have sunken eyes, a dry mouth, and pass only a little dark urine. ? · You feel like you are going to faint. ? · You have increased belly pain that does not go away in 1 to 2 days. ? · You have new or increased nausea, or you are vomiting. ? · You have a new or higher fever. ? · Your stools are black and tarlike or have streaks of blood. ? Watch closely for changes in your health, and be sure to contact your doctor if:  ? · You are dizzy or lightheaded. ? · You urinate less than usual, or your urine is dark yellow or brown. ? · You do not feel better with each day that goes by. Where can you learn more? Go to http://marlen-elly.info/. Enter N142 in the search box to learn more about \"Gastroenteritis: Care Instructions. \"  Current as of: March 3, 2017  Content Version: 11.4  © 2191-6856 [employer]. Care instructions adapted under license by [employer] (which disclaims liability or warranty for this information). If you have questions about a medical condition or this instruction, always ask your healthcare professional. [place] [address] any warranty or liability for your use of this information.

## 2018-06-30 NOTE — MR AVS SNAPSHOT
[address] [place] [address] 
[phone] Patient: Floridalma Jeffrey MRN: [id number] WPM:4[birth year]45/4291 Visit Information Date & Time Provider Department Dept. Phone Encounter #  
 6/30/2018  9:45 AM [address] [facility] [phone] [id number] 7/3/2018  9:40 AM  
OB VISIT with Susan Hidalgo MD  
[facility] ([address]) Appt Note: 32w0d (MG)  
 [id number] [facility] [address] [place] [id number]  
[address] [place]  
  
    
 7/20/2018  9:40 AM  
OB VISIT with Susan Hidalgo MD  
[facility] ([address]) Appt Note: MFM 1st at [address]  
[phone] Upcoming Health Maintenance Date Due DTaP/Tdap/Td series (1 - Tdap) 8/25/2007 OB 3RD TRIMESTER TDAP 5/29/2018 Influenza Age 5 to Adult 8/1/2018 PAP AKA CERVICAL CYTOLOGY 7/20/2019 Allergies as of 6/30/2018  Review Complete On: 6/30/2018 By: Mike Moffett RN Severity Noted Reaction Type Reactions Amoxicillin  03/24/2014   Systemic Rash Current Immunizations  Never Reviewed Name Date Influenza Vaccine (Quad) PF 1/25/2018, 12/22/2016 Not reviewed this visit You Were Diagnosed With   
  
 Codes Comments Non-intractable vomiting with nausea, unspecified vomiting type    -  Primary ICD-10-CM: R11.2 ICD-9-CM: 787.01 Diarrhea, unspecified type     ICD-10-CM: R19.7 ICD-9-CM: 787.91 Vitals BP Pulse Temp Resp Height(growth percentile) Weight(growth percentile) 98/51 92 97.6 °F (36.4 °C) 18 5' 3\" (1.6 m) 123 lb (55.8 kg) LMP SpO2 BMI OB Status Smoking Status 10/30/2017 99% 21.79 kg/m2 Pregnant Never Smoker BMI and BSA Data Body Mass Index Body Surface Area 21.79 kg/m 2 1.57 m 2 Preferred Pharmacy Pharmacy Name Phone [id number] [address] [address] [place] [address] [phone] Your Updated Medication List  
  
   
This list is accurate as of 6/30/18 11:03 AM.  Always use your most recent med list.  
  
  
  
  
 IRON (FERROUS SULFATE) PO Take  by mouth.  
  
 prenatal multivit-ca-min-fe-fa Tab Take  by mouth. Patient Instructions Please call your OBGYN on call and let them know you were evaluated today. Suspect viral gastro enteritis based on family members symptoms Fetal heart tones were within normal limits today Please maintain/increase fluid intake today. If you have any return of vomiting, new or worsening, please go to [facility] immediatly Gastroenteritis: Care Instructions Your Care Instructions Gastroenteritis is an illness that may cause nausea, vomiting, and diarrhea. It is sometimes called \"stomach flu. \" It can be caused by bacteria or a virus. You will probably begin to feel better in 1 to 2 days. In the meantime, get plenty of rest and make sure you do not become dehydrated. Dehydration occurs when your body loses too much fluid. Follow-up care is a key part of your treatment and safety. Be sure to make and go to all appointments, and call your doctor if you are having problems. It's also a good idea to know your test results and keep a list of the medicines you take. How can you care for yourself at home? · If your doctor prescribed antibiotics, take them as directed. Do not stop taking them just because you feel better. You need to take the full course of antibiotics. · Drink plenty of fluids to prevent dehydration, enough so that your urine is light yellow or clear like water. Choose water and other caffeine-free clear liquids until you feel better. If you have kidney, heart, or liver disease and have to limit fluids, talk with your doctor before you increase your fluid intake. · Drink fluids slowly, in frequent, small amounts, because drinking too much too fast can cause vomiting. · Begin eating mild foods, such as dry toast, yogurt, applesauce, bananas, and rice. Avoid spicy, hot, or high-fat foods, and do not drink alcohol or caffeine for a day or two. Do not drink milk or eat ice cream until you are feeling better. How to prevent gastroenteritis · Keep hot foods hot and cold foods cold. · Do not eat meats, dressings, salads, or other foods that have been kept at room temperature for more than 2 hours. · Use a thermometer to check your refrigerator. It should be between 34°F and 40°F. 
· Defrost meats in the refrigerator or microwave, not on the kitchen counter. · Keep your hands and your kitchen clean. Wash your hands, cutting boards, and countertops with hot soapy water frequently. · Cook meat until it is well done. · Do not eat raw eggs or uncooked sauces made with raw eggs. · Do not take chances. If food looks or tastes spoiled, throw it out. When should you call for help? Call 911 anytime you think you may need emergency care. For example, call if: 
? · You vomit blood or what looks like coffee grounds. ? · You passed out (lost consciousness). ? · You pass maroon or very bloody stools. ?Call your doctor now or seek immediate medical care if: 
? · You have severe belly pain. ? · You have signs of needing more fluids. You have sunken eyes, a dry mouth, and pass only a little dark urine. ? · You feel like you are going to faint. ? · You have increased belly pain that does not go away in 1 to 2 days. ? · You have new or increased nausea, or you are vomiting. ? · You have a new or higher fever. ? · Your stools are black and tarlike or have streaks of blood. ? Watch closely for changes in your health, and be sure to contact your doctor if: 
? · You are dizzy or lightheaded. ? · You urinate less than usual, or your urine is dark yellow or brown. ? · You do not feel better with each day that goes by. Where can you learn more? Go to http://marlen-elly.info/. Enter N142 in the search box to learn more about \"Gastroenteritis: Care Instructions. \" Current as of: March 3, 2017 Content Version: 11.4 © 2341-6172 [employer]. Care instructions adapted under license by [employer] (which disclaims liability or warranty for this information). If you have questions about a medical condition or this instruction, always ask your healthcare professional. [address] any warranty or liability for your use of this information. Introducing [facility]! Dear Nikhil Decker: Thank you for requesting a [employer] account. Our records indicate that you already have an active [employer] account. You can access your account anytime at [email or web address] Did you know that you can access your hospital and ER discharge instructions at any time in [employer]? You can also review all of your test results from your hospital stay or ER visit. Additional Information If you have questions, please visit the Frequently Asked Questions section of the [employer] website at [email or web address] Remember, [employer] is NOT to be used for urgent needs. For medical emergencies, dial 911. Now available from your iPhone and Android! Please provide this summary of care documentation to your next provider. Your primary care clinician is listed as Norris Vance. If you have any questions after today's visit, please call [phone].

## 2018-06-30 NOTE — PROGRESS NOTES
HPI Comments:   Patient is 32 weeks pregnant with good adherence to prenatal care with OBGYN     Here for nausea, vomiting, diarrhea   Onset yesterday generalized stomach cramping in evening, felt nauseated then threw up x 1. A few hours later had diarrhea x 1. Some mild decrease in appetite. Has been keeping fluids down today and has not had any further vomiting or diarrhea. There was no blood, mucus or pus. There has been no vaginal discharge. 0/10 abdominal pain. No urinary symptoms, fever or chills. Feels somewhat better today than yesterday. Sick contacts: both family members have identical symptoms. No recent travel or exposure to risky water sources. Patient is a 33 y.o. female presenting with vomiting. Vomiting    Pertinent negatives include no chills, no fever, no headaches, no myalgias and no headaches. Past Medical History:   Diagnosis Date    Anemia     During pregnancy, taking Iron    Kidney stone     Routine Papanicolaou smear 07/20/2016    Negative (no hpv) from [facility], blistering         Past Surgical History:   Procedure Laterality Date    HX CHOLECYSTECTOMY  07/2010    HX OTHER SURGICAL  2011    cholecystectomy    HX WISDOM TEETH EXTRACTION  2009         Family History   Problem Relation Age of Onset    Thyroid Disease Mother     Cancer Father      Prostate     Thyroid Disease Maternal Grandmother     Thyroid Disease Other      6 Siblings of Mother h/o Hyperthyroidism         Social History     Social History    Marital status:      Spouse name: N/A    Number of children: N/A    Years of education: N/A     Occupational History    Not on file.      Social History Main Topics    Smoking status: Never Smoker    Smokeless tobacco: Never Used      Comment: Never used vapor or e-cigs     Alcohol use No    Drug use: No    Sexual activity: Yes     Partners: Male     Birth control/ protection: None     Other Topics Concern    Not on file Social History Narrative                ALLERGIES: Amoxicillin    Review of Systems   Constitutional: Negative for activity change, chills, diaphoresis, fatigue and fever. Eyes: Negative for visual disturbance. Respiratory: Negative for chest tightness and shortness of breath. Cardiovascular: Negative for chest pain and palpitations. Gastrointestinal: Positive for vomiting. Negative for abdominal distention and blood in stool. Endocrine: Negative for polyuria. Genitourinary: Negative for decreased urine volume, dysuria, vaginal bleeding, vaginal discharge and vaginal pain. Musculoskeletal: Negative for back pain and myalgias. Neurological: Negative for dizziness, weakness, light-headedness and headaches. All other systems reviewed and are negative. Vitals:    06/30/18 0959   BP: 98/51   Pulse: 92   Resp: 18   Temp: 97.6 °F (36.4 °C)   SpO2: 99%   Weight: 123 lb (55.8 kg)   Height: 5' 3\" (1.6 m)       Physical Exam   Constitutional: She is oriented to person, place, and time. No distress. Does not appear severely ill or toxic   HENT:   Mouth/Throat: Oropharynx is clear and moist. No oropharyngeal exudate. Eyes: EOM are normal. Pupils are equal, round, and reactive to light. Neck: Normal range of motion. Neck supple. Cardiovascular: Normal rate, regular rhythm and normal heart sounds. Exam reveals no gallop and no friction rub. No murmur heard. Pulmonary/Chest: Effort normal and breath sounds normal. No respiratory distress. She has no wheezes. She has no rales. Abdominal: Soft. Bowel sounds are normal. She exhibits no mass. There is no tenderness. There is no rebound and no guarding. Musculoskeletal: She exhibits no edema. Lymphadenopathy:     She has no cervical adenopathy. Neurological: She is oriented to person, place, and time. Skin: Skin is warm. No rash noted. She is not diaphoretic. No erythema. No pallor. Psychiatric: She has a normal mood and affect.  Her behavior is normal. Thought content normal.       MDM     Differential Diagnosis; Clinical Impression; Plan:       CLINICAL IMPRESSION:  (R11.2) Non-intractable vomiting with nausea, unspecified vomiting type  (primary encounter diagnosis)  (R19.7) Diarrhea, unspecified type  (Z3A.32) 32 weeks gestation of pregnancy        Plan:  Please call your OBGYN on call and let them know you were evaluated today. Suspect viral gastro enteritis based on family members symptoms  VS stable, no concerning findings on exam.  Fetal heart tones were within normal limits today at around 150 bpm  Please maintain/increase fluid intake today. Re-assuring you are keeping down fluids currently. If you have any return of vomiting, new or worsening, please go to [facility] ED immediatly  PCP follow up in 2 days. We have reviewed concerning signs/symptoms, normal vs abnormal progression of medical condition and when to seek immediate medical attention. Schedule with PCP or Urgent Care immediately for worsening or new symptoms.     Risk of Significant Complications, Morbidity, and/or Mortality:   Presenting problems:  Low  Diagnostic procedures:  Low  Management options:  Low  Progress:   Patient progress:  Stable      Procedures

## 2018-07-03 ENCOUNTER — ROUTINE PRENATAL (OUTPATIENT)
Dept: OBGYN CLINIC | Age: 33
End: 2018-07-03

## 2018-07-03 VITALS
BODY MASS INDEX: 22.15 KG/M2 | HEIGHT: 63 IN | DIASTOLIC BLOOD PRESSURE: 58 MMHG | WEIGHT: 125 LBS | HEART RATE: 81 BPM | SYSTOLIC BLOOD PRESSURE: 89 MMHG

## 2018-07-03 DIAGNOSIS — Z34.83 ENCOUNTER FOR SUPERVISION OF OTHER NORMAL PREGNANCY IN THIRD TRIMESTER: Primary | ICD-10-CM

## 2018-07-03 NOTE — PATIENT INSTRUCTIONS

## 2018-07-20 ENCOUNTER — ROUTINE PRENATAL (OUTPATIENT)
Dept: OBGYN CLINIC | Age: 33
End: 2018-07-20

## 2018-07-20 ENCOUNTER — HOSPITAL ENCOUNTER (OUTPATIENT)
Dept: PERINATAL CARE | Age: 33
Discharge: HOME | End: 2018-07-20
Attending: OBSTETRICS & GYNECOLOGY
Payer: COMMERCIAL

## 2018-07-20 VITALS
WEIGHT: 127 LBS | BODY MASS INDEX: 22.5 KG/M2 | HEIGHT: 63 IN | HEART RATE: 83 BPM | SYSTOLIC BLOOD PRESSURE: 95 MMHG | DIASTOLIC BLOOD PRESSURE: 49 MMHG

## 2018-07-20 DIAGNOSIS — Z34.83 ENCOUNTER FOR SUPERVISION OF OTHER NORMAL PREGNANCY IN THIRD TRIMESTER: Primary | ICD-10-CM

## 2018-07-20 PROCEDURE — 76816 OB US FOLLOW-UP PER FETUS: CPT | Performed by: OBSTETRICS & GYNECOLOGY

## 2018-07-20 NOTE — PATIENT INSTRUCTIONS

## 2018-07-20 NOTE — PROGRESS NOTES
Saw MFM, nl per pt. Some mild midline abd discomfort. Unable to void. RTO 2wks with GBS. - Unreliable LMP, US for dating  - US-  shows viable IUP 7+2 -> CHESTER=8/28/18, nl YS, nl CM.  Does show [facility]  - US (1/25/18) 9+1 -> CHESTER=8/29/18  - EOB ur cx contam -> rpt ur cx 50-100K enterococcus -> Macrobid, PANKAJ (3/19) neg  - MSAFP low risk. Declines aneuploidy.  - US (4/12/18) 20+1 @ 20+2. Fetal anatomy nl (does not want to know gender). Placenta fundal, appears thickened in some images (?artifact) -> MFM  - MFM US (4/17/18) placenta appears thickened in some views (6-7mm), but probably d/t left lat location; o/w appears nl  - MFM US (6/19/18) 30+5 @ 30+0. 1655gm (63%). HANNAH=25.4. Placenta thick in some views (11cm), prob nl variant -> rpt 4wks.   - declines TDAP (had high fever to 103 with prev vacc)

## 2018-08-03 ENCOUNTER — ROUTINE PRENATAL (OUTPATIENT)
Dept: OBGYN CLINIC | Age: 33
End: 2018-08-03

## 2018-08-03 VITALS
SYSTOLIC BLOOD PRESSURE: 94 MMHG | HEART RATE: 77 BPM | DIASTOLIC BLOOD PRESSURE: 58 MMHG | HEIGHT: 63 IN | BODY MASS INDEX: 22.15 KG/M2 | WEIGHT: 125 LBS

## 2018-08-03 DIAGNOSIS — Z34.83 ENCOUNTER FOR SUPERVISION OF OTHER NORMAL PREGNANCY IN THIRD TRIMESTER: Primary | ICD-10-CM

## 2018-08-03 LAB — GRBS, EXTERNAL: NEGATIVE

## 2018-08-03 NOTE — PROGRESS NOTES
+FM. Some LLQ discomfort. GBS today. RTO Wed 8/8      - Unreliable LMP, US for dating  - US-  shows viable IUP 7+2 -> CHESTER=8/28/18, nl YS, nl CM.  Does show [facility] & intermediate  - US (1/25/18) 9+1 -> CHESTER=8/29/18  - EOB ur cx contam -> rpt ur cx 50-100K enterococcus -> Macrobid, PANKAJ (3/19) neg  - MSAFP low risk. Declines aneuploidy.  - US (4/12/18) 20+1 @ 20+2. Fetal anatomy nl (does not want to know gender). Placenta fundal, appears thickened in some images (?artifact) -> MFM  - MFM US (4/17/18) placenta appears thickened in some views (6-7mm), but probably d/t left lat location; o/w appears nl  - MFM US (6/19/18) 30+5 @ 30+0. 1655gm (63%). HANNAH=25.4.  Placenta thick in some views (11cm), prob nl variant  - MFM US (7/20/18) 34+2 @ 34+3. 2746  - declines TDAP (had high fever to 103 with prev vacc)

## 2018-08-03 NOTE — PATIENT INSTRUCTIONS
Group B Strep During Pregnancy: Care Instructions  Your Care Instructions    Group B strep infection is caused by a type of bacteria. It's a different kind of bacteria than the kind that causes strep throat. You may have this kind of bacteria in your body. Sometimes it may cause an infection, but most of the time it doesn't make you sick or cause symptoms. But if you pass the bacteria to your baby during the birth, it can cause serious health problems for your baby. If you have this bacteria in your body, you will get antibiotics when you are in labor. Antibiotics help prevent problems for a  baby. After birth, doctors will watch and may test your baby. If your baby tests positive for Group B strep, he or she will get antibiotics. If you plan to breastfeed your baby, don't worry. It will be safe to breastfeed. Follow-up care is a key part of your treatment and safety. Be sure to make and go to all appointments, and call your doctor if you are having problems. It's also a good idea to know your test results and keep a list of the medicines you take. How can you care for yourself at home? · If your doctor has prescribed antibiotics, take them as directed. Do not stop taking them just because you feel better. You need to take the full course of antibiotics. · Tell your doctor if you are allergic to any antibiotic. · If your water breaks, go to the hospital right away. Your doctor will give you antibiotics to help protect your baby from infection. · Tell the doctors and nurses at the hospital that you tested positive for group B strep. When should you call for help? Call your doctor now or seek immediate medical care if:    · You have symptoms of a urinary tract infection. These may include:  ¨ Pain or burning when you urinate. ¨ A frequent need to urinate without being able to pass much urine.   ¨ Pain in the flank, which is just below the rib cage and above the waist on either side of the back.  ¨ Blood in your urine. ¨ A fever.     · You think your water has broken.     · You have pain in your belly or pelvis.    Watch closely for changes in your health, and be sure to contact your doctor if you have any problems. Where can you learn more? Go to http://marlen-elly.info/. Enter M001 in the search box to learn more about \"Group B Strep During Pregnancy: Care Instructions. \"  Current as of: November 21, 2017  Content Version: 11.7  © 7940-2073 [employer]. Care instructions adapted under license by [employer] (which disclaims liability or warranty for this information). If you have questions about a medical condition or this instruction, always ask your healthcare professional. [address] any warranty or liability for your use of this information.

## 2018-08-05 LAB — GP B STREP DNA SPEC QL NAA+PROBE: NEGATIVE

## 2018-08-08 ENCOUNTER — ROUTINE PRENATAL (OUTPATIENT)
Dept: OBGYN CLINIC | Age: 33
End: 2018-08-08

## 2018-08-08 VITALS — BODY MASS INDEX: 22.43 KG/M2 | DIASTOLIC BLOOD PRESSURE: 68 MMHG | SYSTOLIC BLOOD PRESSURE: 114 MMHG | WEIGHT: 126.6 LBS

## 2018-08-08 DIAGNOSIS — Z34.83 ENCOUNTER FOR SUPERVISION OF OTHER NORMAL PREGNANCY IN THIRD TRIMESTER: Primary | ICD-10-CM

## 2018-08-08 NOTE — PROGRESS NOTES
+FM.  GBS neg. No reg ctx/VB. RTO 1wk. - Unreliable LMP, US for dating  - US-  shows viable IUP 7+2 -> CHESTER=8/28/18, nl YS, nl CM.  Does show [facility] care home  - US (1/25/18) 9+1 -> CHESTER=8/29/18  - EOB ur cx contam -> rpt ur cx 50-100K enterococcus -> Macrobid, PANKAJ (3/19) neg  - MSAFP low risk. Declines aneuploidy.  - US (4/12/18) 20+1 @ 20+2. Fetal anatomy nl (does not want to know gender). Placenta fundal, appears thickened in some images (?artifact) -> MFM  - MFM US (4/17/18) placenta appears thickened in some views (6-7mm), but probably d/t left lat location; o/w appears nl  - MFM US (6/19/18) 30+5 @ 30+0. 1655gm (63%). HANNAH=25.4.  Placenta thick in some views (11cm), prob nl variant  - MFM US (7/20/18) 34+2 @ 34+3. 2746  - declines TDAP (had high fever to 103 with prev vacc)

## 2018-08-08 NOTE — PATIENT INSTRUCTIONS
Week 38 of Your Pregnancy: Care Instructions  Your Care Instructions    Believe it or not, your baby is almost here. You may have ideas about your baby's personality because of how much he or she moves. Or you may have noticed how he or she responds to sounds, warmth, cold, and light. You may even know what kind of music your baby likes. By now, you have a better idea of what to expect during delivery. You may have talked about your birth preferences with your doctor. But even if you want a vaginal birth, it is a good idea to learn about  births.  birth means that your baby is born through a cut (incision) in your lower belly. It is sometimes the best choice for the health of the baby and the mother. This care sheet can help you understand  births. It also gives you information about what to expect after your baby is born. And it helps you understand more about postpartum depression. Follow-up care is a key part of your treatment and safety. Be sure to make and go to all appointments, and call your doctor if you are having problems. It's also a good idea to know your test results and keep a list of the medicines you take. How can you care for yourself at home? Learn about  birth  · Most C-sections are unplanned. They are done because of problems that occur during labor. These problems might include:  ¨ Labor that slows or stops. ¨ High blood pressure or other problems for the mother. ¨ Signs of distress in the baby. These signs may include a very fast or slow heart rate. · Although most mothers and babies do well after , it is major surgery. It has more risks than a vaginal delivery. · In some cases, a planned  may be safer than a vaginal delivery. This may be the case if:  ¨ The mother has a health problem, such as a heart condition. ¨ The baby isn't in a head-down position for delivery. This is called a breech position.   ¨ The uterus has scars from past surgeries. This could increase the chance of a tear in the uterus. ¨ There is a problem with the placenta. ¨ The mother has an infection, such as genital herpes, that could be spread to the baby. ¨ The mother is having twins or more. ¨ The baby weighs 9 to 10 pounds or more. · Because of the risks of , planned C-sections generally should be done only for medical reasons. And a planned  should be done at 39 weeks or later unless there is a medical reason to do it sooner. Know what to expect after delivery, and plan for the first few weeks at home  · You, your baby, and your partner or  will get identification bands. Only people with matching bands can  the baby from the nursery. · You will learn how to feed, diaper, and bathe your baby. And you will learn how to care for the umbilical cord stump. If your baby will be circumcised, you will also learn how to care for that. · Ask people to wait to visit you until you are at home. And ask them to wash their hands before they touch your baby. · Make sure you have another adult in your home for at least 2 or 3 days after the birth. · During the first 2 weeks, limit when friends and family can visit. · Do not allow visitors who have colds or infections. Make sure all visitors are up to date with their vaccinations. Never let anyone smoke around your baby. · Try to nap when the baby naps. Be aware of postpartum depression  · \"Baby blues\" are common for the first 1 to 2 weeks after birth. You may cry or feel sad or irritable for no reason. · For some women, these feelings last longer and are more intense. This is called postpartum depression. · If your symptoms last for more than a few weeks or you feel very depressed, ask your doctor for help. · Postpartum depression can be treated. Support groups and counseling can help. Sometimes medicine can also help. Where can you learn more?   Go to http://marlen-elly.info/. Enter B044 in the search box to learn more about \"Week 38 of Your Pregnancy: Care Instructions. \"  Current as of: November 21, 2017  Content Version: 11.7  © 4433-6981 [employer]. Care instructions adapted under license by [employer] (which disclaims liability or warranty for this information). If you have questions about a medical condition or this instruction, always ask your healthcare professional. [address] any warranty or liability for your use of this information.

## 2018-08-15 ENCOUNTER — ROUTINE PRENATAL (OUTPATIENT)
Dept: OBGYN CLINIC | Age: 33
End: 2018-08-15

## 2018-08-15 VITALS
HEART RATE: 71 BPM | WEIGHT: 128 LBS | HEIGHT: 63 IN | DIASTOLIC BLOOD PRESSURE: 60 MMHG | BODY MASS INDEX: 22.68 KG/M2 | SYSTOLIC BLOOD PRESSURE: 101 MMHG

## 2018-08-15 DIAGNOSIS — B37.31 YEAST INFECTION OF THE VAGINA: ICD-10-CM

## 2018-08-15 DIAGNOSIS — N90.89 VULVAR IRRITATION: Primary | ICD-10-CM

## 2018-08-15 LAB
PH BODY FLUID, POCT, PHBFPOCT: 5
SOURCE POCT, SRCEPOCT: ABNORMAL
WET MOUNT POCT, WMPOCT: ABNORMAL

## 2018-08-15 RX ORDER — TERCONAZOLE 8 MG/G
1 CREAM VAGINAL
Qty: 20 G | Refills: 0 | Status: SHIPPED | OUTPATIENT
Start: 2018-08-15 | End: 2018-12-21

## 2018-08-15 NOTE — PROGRESS NOTES
Vaginitis evaluation    Chief Complaint   Vaginal Itching      HPI  33 y.o. female complains of vulvovaginal irritation/itching for 4 days. Patient's last menstrual period was 10/30/2017. She denies additional symptoms at this time. No discharge. The patient denies aggravating factors. She is not concerned about possible STI exposure at this time. She denies exposure to new chemicals ot hygenic agents  Previous treatment included: none  Son dx'd with yeast (perineal)    Past Medical History:   Diagnosis Date    Anemia     During pregnancy, taking Iron    Kidney stone     Routine Papanicolaou smear 07/20/2016    Negative (no hpv) from [address], blistering      Past Surgical History:   Procedure Laterality Date    HX CHOLECYSTECTOMY  07/2010    HX OTHER SURGICAL  2011    cholecystectomy    HX WISDOM TEETH EXTRACTION  2009     Social History     Occupational History    Not on file. Social History Main Topics    Smoking status: Never Smoker    Smokeless tobacco: Never Used      Comment: Never used vapor or e-cigs     Alcohol use No    Drug use: No    Sexual activity: Yes     Partners: Male     Birth control/ protection: None     Family History   Problem Relation Age of Onset    Thyroid Disease Mother     Cancer Father      Prostate     Thyroid Disease Maternal Grandmother     Thyroid Disease Other      6 Siblings of Mother h/o Hyperthyroidism         Allergies   Allergen Reactions    Amoxicillin Rash     Prior to Admission medications    Medication Sig Start Date End Date Taking? Authorizing Provider   terconazole (TERAZOL 3) 0.8 % vaginal cream Insert 1 Applicator into vagina nightly. 8/15/18  Yes Teresa Lara MD   IRON, FERROUS SULFATE, PO Take  by mouth. Yes Historical Provider   prenatal multivit-ca-min-fe-fa tab Take  by mouth.    Yes Historical Provider             Objective:    Visit Vitals    /60    Pulse 71    Ht 5' 3\" (1.6 m)    Wt 128 lb (58.1 kg)    LMP 10/30/2017  BMI 22.67 kg/m2       Physical Exam:   PHYSICAL EXAMINATION    Constitutional  · Appearance: well-nourished, well developed, alert, in no acute distress    HENT  · Head and Face: appears normal    Genitourinary  · External Genitalia: normal appearance for age, some adherent white discharge present, no tenderness present, no inflammatory lesions present, no masses present, no atrophy present  · Vagina:  Some white discharge present adherent to vaginal walls, otherwise normal vaginal vault without central or paravaginal defects, no inflammatory lesions present, no masses present  · Bladder: non-tender to palpation  · Urethra: appears normal  · Cervix: normal  · Uterus: normal size, shape and consistency  · Adnexa: no adnexal tenderness present, no adnexal masses present  · Perineum: perineum within normal limits, no evidence of trauma, no rashes or skin lesions present  · Anus: anus within normal limits, no hemorrhoids present  · Inguinal Lymph Nodes: no lymphadenopathy present    Skin  · General Inspection: no rash, no lesions identified    Neurologic/Psychiatric  · Mental Status:  · Orientation: grossly oriented to person, place and time  · Mood and Affect: mood normal, affect appropriate        Results for orders placed or performed in visit on 08/15/18   AMB POC PH, BODY FLUID EXCEPT BLOOD   Result Value Ref Range    pH,Body fluid (POC) 5.0     Source     AMB POC SMEAR, STAIN & INTERPRET, WET MOUNT   Result Value Ref Range    Wet mount (POC) +yeast     Narrative    WP/[place]    Hypae: present  Buds: present  Whiff: negative    Wet Prep:  Trich: negative  Clue cells: negative  Hyphae: negative  Buds: negative  WBC's: normal         Assessment:   monilia vaginitis    Plan:   Treatment: terazol 3    Orders Placed This Encounter    AMB POC PH, BODY FLUID EXCEPT BLOOD    AMB POC SMEAR, STAIN & INTERPRET, WET MOUNT    terconazole (TERAZOL 3) 0.8 % vaginal cream     Sig: Insert 1 Applicator into vagina nightly. Dispense:  20 g     Refill:  0

## 2018-08-24 ENCOUNTER — ROUTINE PRENATAL (OUTPATIENT)
Dept: OBGYN CLINIC | Age: 33
End: 2018-08-24

## 2018-08-24 VITALS
RESPIRATION RATE: 19 BRPM | BODY MASS INDEX: 22.86 KG/M2 | WEIGHT: 129 LBS | HEIGHT: 63 IN | HEART RATE: 70 BPM | SYSTOLIC BLOOD PRESSURE: 104 MMHG | DIASTOLIC BLOOD PRESSURE: 70 MMHG

## 2018-08-24 DIAGNOSIS — Z34.83 ENCOUNTER FOR SUPERVISION OF OTHER NORMAL PREGNANCY IN THIRD TRIMESTER: Primary | ICD-10-CM

## 2018-08-29 ENCOUNTER — ANESTHESIA EVENT (OUTPATIENT)
Dept: LABOR AND DELIVERY | Age: 33
End: 2018-08-29
Payer: COMMERCIAL

## 2018-08-29 ENCOUNTER — HOSPITAL ENCOUNTER (INPATIENT)
Age: 33
LOS: 2 days | Discharge: HOME | End: 2018-08-31
Attending: OBSTETRICS & GYNECOLOGY | Admitting: OBSTETRICS & GYNECOLOGY
Payer: COMMERCIAL

## 2018-08-29 ENCOUNTER — ANESTHESIA (OUTPATIENT)
Dept: LABOR AND DELIVERY | Age: 33
End: 2018-08-29
Payer: COMMERCIAL

## 2018-08-29 PROBLEM — Z37.9 NORMAL LABOR: Status: ACTIVE | Noted: 2018-08-29

## 2018-08-29 LAB
ERYTHROCYTE [DISTWIDTH] IN BLOOD BY AUTOMATED COUNT: 12.6 % (ref 11.5–14.5)
HCT VFR BLD AUTO: 34.3 % (ref 35–47)
HGB BLD-MCNC: 11.7 G/DL (ref 11.5–16)
MCH RBC QN AUTO: 29.5 PG (ref 26–34)
MCHC RBC AUTO-ENTMCNC: 34.1 G/DL (ref 30–36.5)
MCV RBC AUTO: 86.6 FL (ref 80–99)
NRBC # BLD: 0 K/UL (ref 0–0.01)
NRBC BLD-RTO: 0 PER 100 WBC
PLATELET # BLD AUTO: 198 K/UL (ref 150–400)
PMV BLD AUTO: 12.4 FL (ref 8.9–12.9)
RBC # BLD AUTO: 3.96 M/UL (ref 3.8–5.2)
WBC # BLD AUTO: 7.2 K/UL (ref 3.6–11)

## 2018-08-29 PROCEDURE — 51701 INSERT BLADDER CATHETER: CPT

## 2018-08-29 PROCEDURE — 3E0R3BZ INTRODUCTION OF ANESTHETIC AGENT INTO SPINAL CANAL, PERCUTANEOUS APPROACH: ICD-10-PCS | Performed by: ANESTHESIOLOGY

## 2018-08-29 PROCEDURE — A4300 CATH IMPL VASC ACCESS PORTAL: HCPCS

## 2018-08-29 PROCEDURE — 74011000250 HC RX REV CODE- 250

## 2018-08-29 PROCEDURE — 74011250636 HC RX REV CODE- 250/636

## 2018-08-29 PROCEDURE — 75410000000 HC DELIVERY VAGINAL/SINGLE

## 2018-08-29 PROCEDURE — 76060000078 HC EPIDURAL ANESTHESIA

## 2018-08-29 PROCEDURE — 94760 N-INVAS EAR/PLS OXIMETRY 1: CPT

## 2018-08-29 PROCEDURE — 85027 COMPLETE CBC AUTOMATED: CPT | Performed by: ADVANCED PRACTICE MIDWIFE

## 2018-08-29 PROCEDURE — 74011250637 HC RX REV CODE- 250/637: Performed by: ADVANCED PRACTICE MIDWIFE

## 2018-08-29 PROCEDURE — 77030014125 HC TY EPDRL BBMI -B: Performed by: ANESTHESIOLOGY

## 2018-08-29 PROCEDURE — 74011250636 HC RX REV CODE- 250/636: Performed by: ADVANCED PRACTICE MIDWIFE

## 2018-08-29 PROCEDURE — 65410000002 HC RM PRIVATE OB

## 2018-08-29 PROCEDURE — 75410000002 HC LABOR FEE PER 1 HR

## 2018-08-29 PROCEDURE — 36415 COLL VENOUS BLD VENIPUNCTURE: CPT | Performed by: ADVANCED PRACTICE MIDWIFE

## 2018-08-29 PROCEDURE — 74011250636 HC RX REV CODE- 250/636: Performed by: ANESTHESIOLOGY

## 2018-08-29 PROCEDURE — 00HU33Z INSERTION OF INFUSION DEVICE INTO SPINAL CANAL, PERCUTANEOUS APPROACH: ICD-10-PCS | Performed by: ANESTHESIOLOGY

## 2018-08-29 PROCEDURE — 0KQM0ZZ REPAIR PERINEUM MUSCLE, OPEN APPROACH: ICD-10-PCS | Performed by: OBSTETRICS & GYNECOLOGY

## 2018-08-29 PROCEDURE — 75410000003 HC RECOV DEL/VAG/CSECN EA 0.5 HR

## 2018-08-29 PROCEDURE — 77030031139 HC SUT VCRL2 J&J -A

## 2018-08-29 RX ORDER — SODIUM CHLORIDE 0.9 % (FLUSH) 0.9 %
SYRINGE (ML) INJECTION AS NEEDED
Status: DISCONTINUED | OUTPATIENT
Start: 2018-08-29 | End: 2018-08-29 | Stop reason: HOSPADM

## 2018-08-29 RX ORDER — LIDOCAINE HYDROCHLORIDE AND EPINEPHRINE 15; 5 MG/ML; UG/ML
INJECTION, SOLUTION EPIDURAL AS NEEDED
Status: DISCONTINUED | OUTPATIENT
Start: 2018-08-29 | End: 2018-08-29 | Stop reason: HOSPADM

## 2018-08-29 RX ORDER — FENTANYL CITRATE 50 UG/ML
INJECTION, SOLUTION INTRAMUSCULAR; INTRAVENOUS AS NEEDED
Status: DISCONTINUED | OUTPATIENT
Start: 2018-08-29 | End: 2018-08-29 | Stop reason: HOSPADM

## 2018-08-29 RX ORDER — ONDANSETRON 2 MG/ML
4 INJECTION INTRAMUSCULAR; INTRAVENOUS
Status: DISCONTINUED | OUTPATIENT
Start: 2018-08-29 | End: 2018-08-29

## 2018-08-29 RX ORDER — MINERAL OIL
OIL (ML) ORAL
Status: DISCONTINUED
Start: 2018-08-29 | End: 2018-08-29

## 2018-08-29 RX ORDER — NALOXONE HYDROCHLORIDE 0.4 MG/ML
0.4 INJECTION, SOLUTION INTRAMUSCULAR; INTRAVENOUS; SUBCUTANEOUS AS NEEDED
Status: DISCONTINUED | OUTPATIENT
Start: 2018-08-29 | End: 2018-08-29

## 2018-08-29 RX ORDER — BUPIVACAINE HYDROCHLORIDE 5 MG/ML
INJECTION, SOLUTION EPIDURAL; INTRACAUDAL AS NEEDED
Status: DISCONTINUED | OUTPATIENT
Start: 2018-08-29 | End: 2018-08-29 | Stop reason: HOSPADM

## 2018-08-29 RX ORDER — BUPIVACAINE HYDROCHLORIDE 5 MG/ML
30 INJECTION, SOLUTION EPIDURAL; INTRACAUDAL AS NEEDED
Status: DISCONTINUED | OUTPATIENT
Start: 2018-08-29 | End: 2018-08-29

## 2018-08-29 RX ORDER — OXYTOCIN IN 5 % DEXTROSE 30/500 ML
PLASTIC BAG, INJECTION (ML) INTRAVENOUS
Status: COMPLETED
Start: 2018-08-29 | End: 2018-08-29

## 2018-08-29 RX ORDER — DOCUSATE SODIUM 100 MG/1
100 CAPSULE, LIQUID FILLED ORAL
Status: DISCONTINUED | OUTPATIENT
Start: 2018-08-29 | End: 2018-08-31 | Stop reason: HOSPADM

## 2018-08-29 RX ORDER — FENTANYL CITRATE 50 UG/ML
INJECTION, SOLUTION INTRAMUSCULAR; INTRAVENOUS
Status: DISCONTINUED
Start: 2018-08-29 | End: 2018-08-29

## 2018-08-29 RX ORDER — NALOXONE HYDROCHLORIDE 0.4 MG/ML
0.4 INJECTION, SOLUTION INTRAMUSCULAR; INTRAVENOUS; SUBCUTANEOUS AS NEEDED
Status: DISCONTINUED | OUTPATIENT
Start: 2018-08-29 | End: 2018-08-31 | Stop reason: HOSPADM

## 2018-08-29 RX ORDER — FENTANYL/BUPIVACAINE/NS/PF 2-1250MCG
PREFILLED PUMP RESERVOIR EPIDURAL
Status: DISCONTINUED
Start: 2018-08-29 | End: 2018-08-29

## 2018-08-29 RX ORDER — HYDROCORTISONE ACETATE PRAMOXINE HCL 2.5; 1 G/100G; G/100G
CREAM TOPICAL AS NEEDED
Status: DISCONTINUED | OUTPATIENT
Start: 2018-08-29 | End: 2018-08-31 | Stop reason: HOSPADM

## 2018-08-29 RX ORDER — OXYTOCIN/RINGER'S LACTATE 20/1000 ML
125-500 PLASTIC BAG, INJECTION (ML) INTRAVENOUS ONCE
Status: ACTIVE | OUTPATIENT
Start: 2018-08-29 | End: 2018-08-29

## 2018-08-29 RX ORDER — BUPIVACAINE HYDROCHLORIDE 5 MG/ML
INJECTION, SOLUTION EPIDURAL; INTRACAUDAL
Status: DISCONTINUED
Start: 2018-08-29 | End: 2018-08-29

## 2018-08-29 RX ORDER — SODIUM CHLORIDE 0.9 % (FLUSH) 0.9 %
SYRINGE (ML) INJECTION
Status: DISCONTINUED
Start: 2018-08-29 | End: 2018-08-29

## 2018-08-29 RX ORDER — FENTANYL CITRATE 50 UG/ML
100 INJECTION, SOLUTION INTRAMUSCULAR; INTRAVENOUS
Status: DISCONTINUED | OUTPATIENT
Start: 2018-08-29 | End: 2018-08-29

## 2018-08-29 RX ORDER — FENTANYL CITRATE 50 UG/ML
100 INJECTION, SOLUTION INTRAMUSCULAR; INTRAVENOUS ONCE
Status: DISCONTINUED | OUTPATIENT
Start: 2018-08-29 | End: 2018-08-29

## 2018-08-29 RX ORDER — EPHEDRINE SULFATE 50 MG/ML
10 INJECTION, SOLUTION INTRAVENOUS
Status: DISCONTINUED | OUTPATIENT
Start: 2018-08-29 | End: 2018-08-29

## 2018-08-29 RX ORDER — TERBUTALINE SULFATE 1 MG/ML
0.25 INJECTION SUBCUTANEOUS AS NEEDED
Status: DISCONTINUED | OUTPATIENT
Start: 2018-08-29 | End: 2018-08-29

## 2018-08-29 RX ORDER — EPHEDRINE SULFATE 50 MG/ML
INJECTION, SOLUTION INTRAVENOUS
Status: DISCONTINUED
Start: 2018-08-29 | End: 2018-08-29

## 2018-08-29 RX ORDER — FENTANYL/BUPIVACAINE/NS/PF 2-1250MCG
1-16 PREFILLED PUMP RESERVOIR EPIDURAL CONTINUOUS
Status: DISCONTINUED | OUTPATIENT
Start: 2018-08-29 | End: 2018-08-29

## 2018-08-29 RX ORDER — ACETAMINOPHEN 325 MG/1
650 TABLET ORAL
Status: DISCONTINUED | OUTPATIENT
Start: 2018-08-29 | End: 2018-08-31 | Stop reason: HOSPADM

## 2018-08-29 RX ORDER — IBUPROFEN 400 MG/1
800 TABLET ORAL EVERY 8 HOURS
Status: DISCONTINUED | OUTPATIENT
Start: 2018-08-29 | End: 2018-08-31 | Stop reason: HOSPADM

## 2018-08-29 RX ADMIN — Medication 10 ML: at 01:25

## 2018-08-29 RX ADMIN — FENTANYL CITRATE 100 MCG: 50 INJECTION, SOLUTION INTRAMUSCULAR; INTRAVENOUS at 01:24

## 2018-08-29 RX ADMIN — SODIUM CHLORIDE, SODIUM LACTATE, POTASSIUM CHLORIDE, AND CALCIUM CHLORIDE 1000 ML: 600; 310; 30; 20 INJECTION, SOLUTION INTRAVENOUS at 01:05

## 2018-08-29 RX ADMIN — Medication 30000 MILLI-UNITS: at 03:11

## 2018-08-29 RX ADMIN — Medication 10 ML/HR: at 01:32

## 2018-08-29 RX ADMIN — IBUPROFEN 800 MG: 400 TABLET ORAL at 17:48

## 2018-08-29 RX ADMIN — IBUPROFEN 800 MG: 400 TABLET ORAL at 09:56

## 2018-08-29 RX ADMIN — LIDOCAINE HYDROCHLORIDE AND EPINEPHRINE 4.5 ML: 15; 5 INJECTION, SOLUTION EPIDURAL at 01:22

## 2018-08-29 RX ADMIN — DOCUSATE SODIUM 100 MG: 100 CAPSULE, LIQUID FILLED ORAL at 09:57

## 2018-08-29 RX ADMIN — BUPIVACAINE HYDROCHLORIDE 3 ML: 5 INJECTION, SOLUTION EPIDURAL; INTRACAUDAL at 01:22

## 2018-08-29 NOTE — IP AVS SNAPSHOT
[facility] 
[phone] Patient: Mary Jo Anderson MRN: [id number] About your hospitalization You were admitted on:  August 29, 2018 You last received care in the:  [address] [facility] You were discharged on:  August 31, 2018 Why you were hospitalized Your primary diagnosis was:  Not on File Your diagnoses also included:  Normal Labor Follow-up Information Follow up With Details Comments Contact Info Keri Jeffery NP   [address] [facility] 
[address] Brooke Cabrera [facility] 
[phone] Discharge Orders None A check david indicates which time of day the medication should be taken. My Medications START taking these medications Instructions Each Dose to Equal  
 Morning Noon Evening Bedtime  
 ibuprofen 800 mg tablet Commonly known as:  MOTRIN Your last dose was: Your next dose is: Take 1 Tab by mouth every eight (8) hours. 800 mg ASK your doctor about these medications Instructions Each Dose to Equal  
 Morning Noon Evening Bedtime IRON (FERROUS SULFATE) PO Your last dose was: Your next dose is: Take  by mouth.  
     
   
   
   
  
 prenatal multivit-ca-min-fe-fa Tab Your last dose was: Your next dose is: Take  by mouth. terconazole 0.8 % vaginal cream  
Commonly known as:  TERAZOL 3 Your last dose was: Your next dose is: Insert 1 Applicator into vagina nightly. 1 Applicator Where to Get Your Medications These [facility] to [id number] [address]  [address] [place] [address], [address] [place] [address] Phone:  [phone]  
  ibuprofen 800 mg tablet Discharge Instructions After Your Delivery (the Postpartum Period): Care Instructions Your Care Instructions Congratulations on the birth of your baby. Like pregnancy, the  period can be a time of excitement, raeann, and exhaustion. You may look at your wondrous little baby and feel happy. You may also be overwhelmed by your new sleep hours and new responsibilities. At first, babies often sleep during the days and are awake at night. They do not have a pattern or routine. They may make sudden gasps, jerk themselves awake, or look like they have crossed eyes. These are all normal, and they may even make you smile. In these first weeks after delivery, try to take good care of yourself. It may take 4 to 6 weeks to feel like yourself again, and possibly longer if you had a  birth. You will likely feel very tired for several weeks. Your days will be full of ups and downs, but lots of raeann as well. Follow-up care is a key part of your treatment and safety. Be sure to make and go to all appointments, and call your doctor if you are having problems. It's also a good idea to know your test results and keep a list of the medicines you take. How can you care for yourself at home? Take care of your body after delivery · Use pads instead of tampons for the bloody flow that may last as long as 2 weeks. · Ease cramps with ibuprofen (Advil, Motrin). · Ease soreness of hemorrhoids and the area between your vagina and rectum with ice compresses or witch hazel pads. · Ease constipation by drinking lots of fluid and eating high-fiber foods. Ask your doctor about over-the-counter stool softeners. · Cleanse yourself with a gentle squeeze of warm water from a bottle instead of wiping with toilet paper. · Take a sitz bath in warm water several times a day. · Wear a good nursing bra. Ease sore and swollen breasts with warm, wet washcloths. · If you are not breastfeeding, use ice rather than heat for breast soreness. · Your period may not start for several months if you are breastfeeding. You may bleed more, and longer at first, than you did before you got pregnant. · Wait until you are healed (about 4 to 6 weeks) before you have sexual intercourse. Your doctor will tell you when it is okay to have sex. · Try not to travel with your baby for 5 or 6 weeks. If you take a long car trip, make frequent stops to walk around and stretch. Avoid exhaustion · Rest every day. Try to nap when your baby naps. · Ask another adult to be with you for a few days after delivery. · Plan for  if you have other children. · Stay flexible so you can eat at odd hours and sleep when you need to. Both you and your baby are making new schedules. · Plan small trips to get out of the house. Change can make you feel less tired. · Ask for help with housework, cooking, and shopping. Remind yourself that your job is to care for your baby. Know about help for postpartum depression · \"Baby blues\" are common for the first 1 to 2 weeks after birth. You may cry or feel sad or irritable for no reason. · Rest whenever you can. Being tired makes it harder to handle your emotions. · Go for walks with your baby. · Talk to your partner, friends, and family about your feelings. · If your symptoms last for more than a few weeks, or if you feel very depressed, ask your doctor for help. · Postpartum depression can be treated. Support groups and counseling can help. Sometimes medicine can also help. Stay healthy · Eat healthy foods so you have more energy, make good breast milk, and lose extra baby pounds. · If you breastfeed, avoid alcohol and drugs. If you quit smoking during pregnancy, try to stay smoke-free. · Start daily exercise after 4 to 6 weeks, but rest when you feel tired. · Learn exercises to tone your belly. Do Kegel exercises to regain strength in your pelvic muscles. You can do these exercises while you stand or sit. ¨ Squeeze the same muscles you would use to stop your urine. Your belly and thighs should not move. ¨ Hold the squeeze for 3 seconds, and then relax for 3 seconds. ¨ Start with 3 seconds. Then add 1 second each week until you are able to squeeze for 10 seconds. ¨ Repeat the exercise 10 to 15 times for each session. Do three or more sessions each day. · Find a class for new mothers and new babies that has an exercise time. · If you had a  birth, give yourself a bit more time before you exercise, and be careful. When should you call for help? Call 911 anytime you think you may need emergency care. For example, call if: 
  · You passed out (lost consciousness).  
 Call your doctor now or seek immediate medical care if: 
  · You have severe vaginal bleeding. This means you are passing blood clots and soaking through a pad each hour for 2 or more hours.  
  · You are dizzy or lightheaded, or you feel like you may faint.  
  · You have a fever.  
  · You have new belly pain, or your pain gets worse.  
 Watch closely for changes in your health, and be sure to contact your doctor if: 
  · Your vaginal bleeding seems to be getting heavier.  
  · You have new or worse vaginal discharge.  
  · You feel sad, anxious, or hopeless for more than a few days.  
  · You do not get better as expected. Where can you learn more? Go to http://marlen-elly.info/. Enter A461 in the search box to learn more about \"After Your Delivery (the Postpartum Period): Care Instructions. \" Current as of: 2017 Content Version: 11.7 © 9507-1133 [employer]. Care instructions adapted under license by [employer] (which disclaims liability or warranty for this information). If you have questions about a medical condition or this instruction, always ask your healthcare professional. [address] any warranty or liability for your use of this information. Introducing [facility]! Dear Gregory Saavedra: Thank you for requesting a [employer] account. Our records indicate that you already have an active [employer] account. You can access your account anytime at [email or web address]. [employer] Did you know that you can access your hospital and ER discharge instructions at any time in [employer]? You can also review all of your test results from your hospital stay or ER visit. Additional Information If you have questions, please visit the Frequently Asked Questions section of the [employer] website at [email or web address] [employer]/. Remember, [employer] is NOT to be used for urgent needs. For medical emergencies, dial 911. Now available from your iPhone and Android! Introducing Allen Camarena As a Hermelinda Casey patient, I wanted to make you aware of our electronic visit tool called Allen Migue. Hermelinda Publicate 24/7 allows you to connect within minutes with a medical provider 24 hours a day, seven days a week via a mobile device or tablet or logging into a secure website from your computer. You can access Allen Camarena from anywhere in the [place]. A virtual visit might be right for you when you have a simple condition and feel like you just dont want to get out of bed, or cant get away from work for an appointment, when your regular Hermelinda Carmela provider is not available (evenings, weekends or holidays), or when youre out of town and need minor care. Electronic visits cost only $49 and if the Hermelinda [place] [employer] provider determines a prescription is needed to treat your condition, one can be electronically transmitted to a nearby pharmacy*. Please take a moment to enroll today if you have not already done so. The enrollment process is free and takes just a few minutes. To enroll, please download the [employer] francis to your tablet or phone, or visit [email or web address] to enroll on your computer. And, as an [address] [place] [address] patient with a [employer] account, the results of your visits will be scanned into your electronic medical record and your primary care provider will be able to view the scanned results. We urge you to continue to see your regular [address] [place] [address] provider for your ongoing medical care. And while your primary care provider may not be the one available when you seek a Allen Camarena virtual visit, the peace of mind you get from getting a real diagnosis real time can be priceless. For more information on Allen Fossfin, view our Frequently Asked Questions (FAQs) at [email or web address]. Sincerely, 
 
Helena Fields MD 
Chief Medical Officer Wilfrido Lyly Hewitt *:  certain medications cannot be prescribed via Allen Prudencefin Providers Seen During Your Hospitalization Provider Specialty Primary office phone Brisa Arreaga MD Obstetrics & Gynecology [phone] William Kuo MD Obstetrics & Gynecology [phone] Your Primary Care Physician (PCP) Primary Care Physician Office Phone Office Fax [place] [address], [facility] [phone] You are allergic to the following Allergen Reactions Amoxicillin Rash Recent Documentation Height Weight Breastfeeding? BMI OB Status Smoking Status 1.6 m 56.7 kg No 22.14 kg/m2 Pregnant Never Smoker Emergency Contacts Name Discharge Info Relation Home Work Mobile Joel Russell DISCHARGE CAREGIVER [3] Spouse [3] [phone] [facility] CAREGIVER [3] Parent [1] [phone] Patient Belongings The following personal items are in your possession at time of discharge: 
  Dental Appliances: None  Visual Aid: None      Home Medications: None   Jewelry: None  Clothing: At bedside    Other Valuables: None  Personal Items Sent to Safe: None Please provide this summary of care documentation to your next provider. Signatures-by signing, you are acknowledging that this After Visit Summary has been reviewed with you and you have received a copy. Patient Signature:  ____________________________________________________________ Date:  ____________________________________________________________  
  
Yennifer Hero Provider Signature:  ____________________________________________________________ Date:  ____________________________________________________________

## 2018-08-29 NOTE — LACTATION NOTE
This note was copied from a baby's chart.     Couplet Interdisciplinary Rounds     MATERNAL    Daily Goal:     Influenza screening completed: NA   Tdap screening completed: Patient refused   Rhogam Given:N/A  MMR Given:N/A    VTE Prophylaxis: Not indicated, per Provider order    EPDS:            Patient Name: Tati Javier Diagnosis: Gordon  Liveborn infant by vaginal delivery   Date of Admission: 2018 LOS: 0  Gestational Age: Gestational Age: 44w3d       Daily Goal:     Birth Weight: 3.345 kg Current Weight: Weight: 3.345 kg (Filed from Delivery Summary)  % of Weight Change: 0%    Feeding:  Gordon Metabolic Screen: NO    Hepatitis B:  NO    Discharge Bili:  NO  Car Seat Trial, if needed:  N/A      Patient/Family Teaching Needs:     Days before discharge: One day until discharge    In Attendance:  Nursing and Physician

## 2018-08-29 NOTE — LACTATION NOTE
This note was copied from a baby's chart. Mom requesting assistance with getting infant latched. He was awake in mom's arms at the time of my visit. Assisted mom with latching infant deeply. Rhythmic sucking noted with audible swallowing. Demonstrated manual expression to mom and obtained 4 ml of EBM. I showed mom how to syringe feed the EBM to the infant.

## 2018-08-29 NOTE — PROGRESS NOTES
S/P Epidural  Reports increase in rectal pressure  Denies urge to push  O:  Visit Vitals    /88    Pulse (!) 58    Ht 5' 3\" (1.6 m)    Wt 125 lb (56.7 kg)    LMP 10/30/2017    Breastfeeding No    BMI 22.14 kg/m2     , Cat I  [place] Q 2  SVE  8/C/0  A/P: Term IUP, Active labor  Anticipate     Katrina Russo Pain, WHNP/CNM

## 2018-08-29 NOTE — ROUTINE PROCESS
TRANSFER - IN REPORT:    Verbal report received from JACQUIE Tovar RN(name) on Bre Marker  being received from L&D(unit) for routine progression of care      Report consisted of patients Situation, Background, Assessment and   Recommendations(SBAR). Information from the following report(s) SBAR, Kardex, Intake/Output, MAR, Accordion and Recent Results was reviewed with the receiving nurse. Opportunity for questions and clarification was provided. Assessment completed upon patients arrival to unit and care assumed.

## 2018-08-29 NOTE — H&P
History & Physical    Name: Pallavi Tillman MRN: [id number]  SSN: [id number]    YOB: 1985  Age: 29 y.o. Sex: female        Subjective:     Estimated Date of Delivery: 18  OB History      Para Term  AB Living    2 1 1   1    SAB TAB Ectopic Molar Multiple Live Births        0 1          MsKrystal Javier is admitted with pregnancy at 40w1d for active labor SROM @ 2330 and UC's began soon after. Planned for delivery @ [facility], but lives [address] [place] [address] and was concerned about quick delivery as she had quick labor and delivery with first baby complicated by 3rd degree lac. Prenatal course was normal. Please see prenatal records for details. Patient Active Problem List    Diagnosis    Pregnancy     - Unreliable LMP, US for dating  - US-  shows viable IUP 7+2 -> CHESTER=18, nl YS, nl CM. Does show [facility]  - US (18) 9+1 -> CHESTER=18  - EOB ur cx contam -> rpt ur cx 50-100K enterococcus -> Macrobid, PANKAJ (3/19) neg  - MSAFP low risk. Declines aneuploidy.  - US (18) 20+1 @ 20+2. Fetal anatomy nl (does not want to know gender). Placenta fundal, appears thickened in some images (?artifact) -> MFM  - MFM US (18) placenta appears thickened in some views (6-7mm), but probably d/t left lat location; o/w appears nl  - MFM US (18) 30+5 @ 30+0. 1655gm (63%). HANNAH=25.4. Placenta thick in some views (11cm), prob nl variant  - MFM US (18) 34+2 @ 34+3. 2746 (67%). HANNAH=16. Placenta left lateral, some views appear thickened, prob nl variant/artifact  - declines TDAP (had high fever to 103 with prev vacc)       - Unreliable LMP, US for dating  - US-  shows viable IUP 7+2 -> CHESTER=18, nl YS, nl CM. Does show [facility]  - US (18) 9+1 -> CHESTER=18  - EOB ur cx contam -> rpt ur cx 50-100K enterococcus -> Macrobid, PANKAJ (3/19) neg  - MSAFP low risk. Declines aneuploidy.  - US (18) 20+1 @ 20+2. Fetal anatomy nl (does not want to know gender).  Placenta fundal, appears thickened in some images (?artifact) -> MFM  - MFM US (4/17/18) placenta appears thickened in some views (6-7mm), but probably d/t left lat location; o/w appears nl  - MFM US (6/19/18) 30+5 @ 30+0. 1655gm (63%). HANNAH=25.4. Placenta thick in some views (11cm), prob nl variant  - MFM US (7/20/18) 34+2 @ 34+3. 2746 (67%). HANNAH=16. Placenta left lateral, some views appear thickened, prob nl variant/artifact  - declines TDAP (had high fever to 103 with prev vacc)  Past Medical History:   Diagnosis Date    Anemia     During pregnancy, taking Iron    Kidney stone     Routine Papanicolaou smear 07/20/2016    Negative (no hpv) from [address], blistering      Past Surgical History:   Procedure Laterality Date    HX CHOLECYSTECTOMY  07/2010    HX OTHER SURGICAL  2011    cholecystectomy    HX WISDOM TEETH EXTRACTION  2009     Social History     Occupational History    Not on file. Social History Main Topics    Smoking status: Never Smoker    Smokeless tobacco: Never Used      Comment: Never used vapor or e-cigs     Alcohol use No    Drug use: No    Sexual activity: Yes     Partners: Male     Birth control/ protection: None     Family History   Problem Relation Age of Onset    Thyroid Disease Mother     Cancer Father      Prostate     Thyroid Disease Maternal Grandmother     Thyroid Disease Other      6 Siblings of Mother h/o Hyperthyroidism        Allergies   Allergen Reactions    Amoxicillin Rash     Prior to Admission medications    Medication Sig Start Date End Date Taking? Authorizing Provider   terconazole (TERAZOL 3) 0.8 % vaginal cream Insert 1 Applicator into vagina nightly. 8/15/18   Brittany Birmingham MD   IRON, FERROUS SULFATE, PO Take  by mouth. Historical Provider   prenatal multivit-ca-min-fe-fa tab Take  by mouth. Historical Provider        Review of Systems: A comprehensive review of systems was negative except for that written in the HPI.     Objective:     Vitals:  Vitals:    08/29/18 0034 Weight: 125 lb (56.7 kg)   Height: 5' 3\" (1.6 m)        Physical Exam:  Patient without distress. Abdomen: soft, nontender  Fundus: soft and non tender  Perineum: blood absent, amniotic fluid present  Cervical Exam: 5 cm dilated    90% effaced    -1 station    Presenting Part: cephalic  Cervical Position: mid position  Consistency: Soft  Lower Extremities:  - Edema No  Membranes:  Spontaneous Rupture of Membranes; Amniotic Fluid: clear fluid  Fetal Heart Rate: Baseline: 120 per minute, Cat I  Variability: moderate  Accelerations: yes  Decelerations: none  Uterine contractions: regular, every 2 minutes    Prenatal Labs:   Lab Results   Component Value Date/Time    Rubella, External Immune 01/25/2018    GrBStrep, External Negative  08/03/2018    HBsAg, External Negative  01/25/2018    HIV, External Negative  01/25/2018    Gonorrhea, External Negative 01/11/2018    Chlamydia, External Negative 01/11/2018        Assessment/Plan:     Plan: Admit for Reassuring fetal status, Labor  Progressing normally  Continue expectant management, Continue plan for vaginal delivery. Group B Strep was negative. Pt strongly desires Epidural ASAP  Will work on IVF bolus and labs and notify Anesthesia  Pt unsure whether she desires CNM vs. MD care. Reviewed options here @ [facility] and her and spouse will discuss. MD coverage aware of potential for assuming care.      Signed By:  Connor Dsouza NP     August 29, 2018

## 2018-08-29 NOTE — IP AVS SNAPSHOT
[address] [address] 
[phone] Patient: Azeem Barone MRN: [id number] A check david indicates which time of day the medication should be taken. My Medications START taking these medications Instructions Each Dose to Equal  
 Morning Noon Evening Bedtime  
 ibuprofen 800 mg tablet Commonly known as:  MOTRIN Your last dose was: Your next dose is: Take 1 Tab by mouth every eight (8) hours. 800 mg ASK your doctor about these medications Instructions Each Dose to Equal  
 Morning Noon Evening Bedtime IRON (FERROUS SULFATE) PO Your last dose was: Your next dose is: Take  by mouth.  
     
   
   
   
  
 prenatal multivit-ca-min-fe-fa Tab Your last dose was: Your next dose is: Take  by mouth. terconazole 0.8 % vaginal cream  
Commonly known as:  TERAZOL 3 Your last dose was: Your next dose is: Insert 1 Applicator into vagina nightly. 1 Applicator Where to Get Your Medications These medications were sent to [facility] #[id number] [address]  [address] [place] [address] Phone:  [phone]  
  ibuprofen 800 mg tablet

## 2018-08-29 NOTE — PROGRESS NOTES
OB HOSPITALIST    Called to bedside secondary to retained placenta. Patient had delivered uncomplicated by Nurse Midwife Bunny Maddox approximately 15 minutes prior to my arrival.  There was an apparent membranous cord insertion such that the cord and the membranes had all delivered. Exam revealed a contracted uterus with a fundally located placenta. Patient had an excellent epidural and tolerated manual extraction of placenta well. Uterus was manually explored after delivery of placenta to ensure no fragments remained and was felt to be clear. Uterus was firm and there was minimal bleeding observed during my exam and exploration. MsKrystal Maddox was left to attend to the repair.

## 2018-08-29 NOTE — L&D DELIVERY NOTE
Delivery Summary    Became Complete and +3, with slight urge to push. Pushed for ~10 min.  Live Male infant. Over 2nd degree perineal lac. Infant placed to maternal abdomen. Infant dried and stimulated, spontaneous cry. Cord allowed to cease pulsations, then doubly clamped and cut per FOB. 3VC. Cordblood Obtained yes. Marginal cord insertion with cord avulsion. Dr. Garcia [facility]) in to assist with manual removal of placenta without diff. Repair of 2nd degree perineal lac with 3-0 Vicryl in usual fashion, good hemostasis. EBL 300I In and Out cath for 300 cl yellow urine. .All counts correct yes. To RR, Stable. Patient: Jono Padilla MRN: [id number]  SSN: [id number]    YOB: 1985  Age: 29 y.o.   Sex: female        Labor Events:    Labor: No    Rupture Date: 2018    Rupture Time: 11:30 PM    Rupture Type: SROM    Amniotic Fluid Volume: Large     Amniotic Fluid Description:  Amniotic Fluid Odor: Clear    None     Induction: None         Induction Date:        Induction Time:       Indications for Induction:       Augmentation: None    Augmentation Date:      Augmentation Time:      Indications for Augmentation:      Cervical Ripening:            Rupture Identifier:       Labor complications: None     Additional complications:           Delivery Events:  Episiotomy: None    Indications for Episiotomy:      Laceration(s): Second degree perineal       Repaired: Yes     Number of Repair Packets: 1    Suture Type and Size: Vicryl 3-0        Estimated Blood Loss (ml): 300        Information for the patient's :  Abner Reeves [[id number]]     Delivery Summary - Baby    Delivery Date: 2018   Delivery Time: 2:43 AM   Delivery Type: Vaginal, Spontaneous Delivery  Sex:  male  Gestational Age: 44w3d  Delivery Clinician:  Marj Manzo  Living?: Living   Delivery Location: L&D             APGARS  One minute Five minutes Ten minutes   Skin Color: 0    1       Heart Rate: 2   2 Reflex Irritability: 2   2         Muscle Tone: 2   2       Respiration: 2   2         Total: 8   9           Presentation: Vertex  Position: Left Occiput Anterior  Resuscitation Method:  Suctioning-bulb; Tactile Stimulation     Meconium Stained: None    Cord Information: 3 Vessels   Complications: None  Cord Blood Sent?:  Yes    Blood Gases Sent?:  No    Placenta:  Date/Time:    Removal: Manual Removal      Appearance: Other (comment)      Measurements:  Birth Weight:      Birth Length:     Head Circumference:       Chest Circumference:      Abdominal Girth:       Other Providers:   ;Cyndi KHAN;;;;;;Jeni HERNÁNDEZ Obstetrician;Primary Nurse;Primary [place] Nurse;Nicu Nurse;Neonatologist;Anesthesiologist;Crna;Nurse Practitioner;Midwife;Nursery Nurse           Cord Blood Results:  Information for the patient's :  Lauren Olvera [[id number]]   No results found for: ABORH, PCTABR, PCTDIG, BILI, ABORHEXT, [address]    Information for the patient's :  Luaren Olvera [[id number]]   No results found for: APH, APCO2, APO2, AHCO3, ABEC, ABDC, O2ST, SITE, RSCOM, PHI, PCO2I, PO2I, HCO3I, SO2I, IBD     Information for the patient's :  Lauren Olvera [[id number]]   No results found for: EPHV, PCO2V, PO2V, HCO3V, O2STV, EBDV

## 2018-08-29 NOTE — ANESTHESIA PROCEDURE NOTES
Epidural Block Start time: 8/29/2018 1:19 AM 
End time: 8/29/2018 1:28 AM 
Performed by: Janette Cesar Authorized by: Janette Cesar Pre-Procedure Indication: labor epidural   
Preanesthetic Checklist: patient identified, risks and benefits discussed, anesthesia consent, site marked, patient being monitored, timeout performed and anesthesia consent Epidural:  
Patient position:  Left lateral decubitus Prep region:  Lumbar Prep: DuraPrep Location:  L3-4 Needle and Epidural Catheter:  
Needle Type:  Tuohy Needle Gauge:  17 G Injection Technique:  Loss of resistance using air Attempts:  1 Catheter Size:  19 G Catheter at Skin Depth (cm):  8 Depth in Epidural Space (cm):  4 Events: no blood with aspiration, no cerebrospinal fluid with aspiration, no paresthesia and negative aspiration test   
Test Dose:  Lidocaine 1.5% w/ epi and negative Assessment:  
Catheter Secured:  Tegaderm and tape Insertion:  Uncomplicated Patient tolerance:  Patient tolerated the procedure well with no immediate complications

## 2018-08-29 NOTE — PROGRESS NOTES
S/P , PPday #0  Ambulating without diff, due to void after FC removed  Vale po and po meds well  Attempting to breastfeed, Lactation @ bedside    O: VSS, Afebrile       Patient Vitals for the past 24 hrs:   Temp Pulse Resp BP   18 0655 98 °F (36.7 °C) 61 16 109/69   18 0610 98.4 °F (36.9 °C) 67 16 101/64   18 0545 98.3 °F (36.8 °C) 60 16 108/66   18 0523 98.4 °F (36.9 °C) (!) 56 16 115/72   18 0506 - 60 14 113/71   18 0450 - 63 16 121/63   18 0435 - 60 16 131/80   18 0420 - (!) 59 16 126/78   18 0405 - 61 16 119/73   18 0350 - (!) 59 16 122/72   18 0335 - 70 16 113/66   18 0321 97.4 °F (36.3 °C) 66 16 106/63   18 0156 - 61 - 110/55   18 0141 - 70 - 102/59   18 0136 - 72 - 104/57   18 0132 - 67 - 108/62   18 0130 - 72 - 131/60   18 0126 - 65 - 118/75   18 0059 98.2 °F (36.8 °C) (!) 58 16 136/88           Breasts soft, NT        FF @ U-1 ML, Lochia Small Rubra        Perineum Intact        Ext NT, No REP, Neg Madhav's    A/P: Routine PP care          Maternal Education          Lactation consultation prn                   Belen Slider E.  Dorrine Pain, BOOM/JACK

## 2018-08-29 NOTE — PROGRESS NOTES
1822: Patient arrived from home with complaints of rupture at 2330. She states that she then started having contractions coming within 5 minutes of eachother. Reports positive fetal movement. Plans for an epidural.    0051: Bolus started for epidural.     0243:  of liveborn male infant. 0255: Dr. Arnoldo Underwood at bedside to assist with manual removal of placenta. 4832: Attempted to stand patient up in order to move her to the wheelchair. Patient became dizzy at this time. Assisted back to bed.     0555: TRANSFER - OUT REPORT:    Verbal report given to MIGUEL Monroe RN (name) on Azeem Leaf  being transferred to mother infant unit (unit) for routine progression of care       Report consisted of patients Situation, Background, Assessment and   Recommendations(SBAR). Information from the following report(s) SBAR, MAR and Recent Results was reviewed with the receiving nurse. Lines:   Peripheral IV 18 Left Arm (Active)   Site Assessment Clean, dry, & intact 2018  1:00 AM   Phlebitis Assessment 0 2018  1:00 AM   Infiltration Assessment 0 2018  1:00 AM   Dressing Status Clean, dry, & intact 2018  1:00 AM   Dressing Type Transparent 2018  1:00 AM   Hub Color/Line Status Pink 2018  1:00 AM        Opportunity for questions and clarification was provided.       Patient transported with:   Registered Nurse

## 2018-08-29 NOTE — ANESTHESIA PREPROCEDURE EVALUATION
Anesthetic History No history of anesthetic complications Review of Systems / Medical History Patient summary reviewed, nursing notes reviewed and pertinent labs reviewed Pulmonary Within defined limits Neuro/Psych Within defined limits Cardiovascular Within defined limits GI/Hepatic/Renal 
Within defined limits Endo/Other Within defined limits Other Findings Physical Exam 
 
Airway Mallampati: II 
TM Distance: > 6 cm Neck ROM: normal range of motion Mouth opening: Normal 
 
 Cardiovascular Regular rate and rhythm,  S1 and S2 normal,  no murmur, click, rub, or gallop Dental 
No notable dental hx Pulmonary Breath sounds clear to auscultation Abdominal 
GI exam deferred Other Findings Anesthetic Plan ASA: 2 Anesthesia type: epidural 
 
 
 
 
 
Anesthetic plan and risks discussed with: Patient

## 2018-08-29 NOTE — PROGRESS NOTES
Bedside shift change report given to Sabrina Dandy, RN (oncoming nurse) by MIGUEL Rowell RN (offgoing nurse). Report included the following information SBAR.     9:40 AM  Patient up to bathroom with assistance. Voided without difficulty. Check void by 1540. Tanya care education completed. Aware to call for assistance  Out of bed. 11:15 AM  Patient up to bathroom with assistance. Check void complete.

## 2018-08-30 PROCEDURE — 74011000250 HC RX REV CODE- 250: Performed by: ADVANCED PRACTICE MIDWIFE

## 2018-08-30 PROCEDURE — 65410000002 HC RM PRIVATE OB

## 2018-08-30 PROCEDURE — 74011250637 HC RX REV CODE- 250/637: Performed by: ADVANCED PRACTICE MIDWIFE

## 2018-08-30 RX ORDER — POLYETHYLENE GLYCOL 3350 17 G/17G
17 POWDER, FOR SOLUTION ORAL DAILY
Status: DISCONTINUED | OUTPATIENT
Start: 2018-08-30 | End: 2018-08-31 | Stop reason: HOSPADM

## 2018-08-30 RX ADMIN — POLYETHYLENE GLYCOL 3350 17 G: 17 POWDER, FOR SOLUTION ORAL at 15:38

## 2018-08-30 RX ADMIN — IBUPROFEN 800 MG: 400 TABLET ORAL at 10:07

## 2018-08-30 RX ADMIN — IBUPROFEN 800 MG: 400 TABLET ORAL at 01:50

## 2018-08-30 RX ADMIN — IBUPROFEN 800 MG: 400 TABLET ORAL at 20:02

## 2018-08-30 RX ADMIN — DOCUSATE SODIUM 100 MG: 100 CAPSULE, LIQUID FILLED ORAL at 17:25

## 2018-08-30 NOTE — PROGRESS NOTES
S/P , PPday #1  Ambulating and voiding without diff  Vale po and po meds well  Breastfeeding well established  Desires order for Miralax as had difficulty with BM after first baby  [address] [place] [address] for infant, will give card info prior to discharge in am    O: VSS, Afebrile       Patient Vitals for the past 24 hrs:   Temp Pulse Resp BP   18 1005 97.8 °F (36.6 °C) 70 16 110/68   18 0200 97.7 °F (36.5 °C) 69 14 105/68   18 1730 97.7 °F (36.5 °C) 69 16 117/78           Breasts soft, NT        FF @ U-1 ML, Lochia Small Rubra        Perineum Intact        Ext NT, No REP, Neg Madhav's    A/P: Routine PP care          Maternal Education          Lactation consultation prn          Plan Discharge in am    Katrina Cardenas, BOOM/JACK

## 2018-08-30 NOTE — ROUTINE PROCESS
Bedside and Verbal shift change report given to NADER Bagley RN (oncoming nurse) by Brian Robertson RN (offgoing nurse).  Report included the following information SBAR, Kardex, Procedure Summary, Intake/Output, MAR, Recent Results and Med Rec Status

## 2018-08-31 VITALS
HEIGHT: 63 IN | BODY MASS INDEX: 22.15 KG/M2 | SYSTOLIC BLOOD PRESSURE: 115 MMHG | DIASTOLIC BLOOD PRESSURE: 71 MMHG | HEART RATE: 73 BPM | TEMPERATURE: 98.1 F | WEIGHT: 125 LBS | RESPIRATION RATE: 16 BRPM

## 2018-08-31 PROCEDURE — 74011250637 HC RX REV CODE- 250/637: Performed by: ADVANCED PRACTICE MIDWIFE

## 2018-08-31 PROCEDURE — 74011000250 HC RX REV CODE- 250: Performed by: ADVANCED PRACTICE MIDWIFE

## 2018-08-31 RX ORDER — IBUPROFEN 800 MG/1
800 TABLET ORAL EVERY 8 HOURS
Qty: 90 TAB | Refills: 3 | Status: SHIPPED | OUTPATIENT
Start: 2018-08-31 | End: 2018-12-21

## 2018-08-31 RX ADMIN — IBUPROFEN 800 MG: 400 TABLET ORAL at 08:02

## 2018-08-31 RX ADMIN — DOCUSATE SODIUM 100 MG: 100 CAPSULE, LIQUID FILLED ORAL at 08:06

## 2018-08-31 RX ADMIN — POLYETHYLENE GLYCOL 3350 17 G: 17 POWDER, FOR SOLUTION ORAL at 08:06

## 2018-08-31 NOTE — LACTATION NOTE
This note was copied from a baby's chart. Not seen at breast, mother declines [facility] consult, expresses confidence in ability to breastfeed independently. Mother reports Baby nursing well and has improved throughout post partum stay, deep latch maintained, mother is comfortable, milk is in transition, baby feeding vigorously with rhythmic suck, swallow, breathe pattern, with audible swallowing, and evident milk transfer, both breasts offerd, baby is asleep following feeding. Baby is feeding on demand, voiding and stools present as appropriate over the last 24 hours. Baby has started gaining weight. Mother states that she has no further questions for Lactation Consultant before discharge. Mother agrees to call outpatient lactation line with questions or seek help from her provider if needed.

## 2018-08-31 NOTE — ROUTINE PROCESS
Bedside and Verbal shift change report given to ANETA Sam RN (oncoming nurse) by NADER Bergman RN (offgoing nurse). Report included the following information SBAR.

## 2018-08-31 NOTE — DISCHARGE SUMMARY
Obstetrical Discharge Summary     Name: Livia Rios MRN: [id number]  SSN: [id number]    YOB: 1985  Age: 29 y.o. Sex: female      Allergies: Amoxicillin    Admit Date: 2018    Discharge Date: 2018     Admitting Provider: Michelle Velazquez CNM    Attending Provider: Michelle Velazquez CNM    * Admission Diagnoses: Normal labor    * Discharge Diagnoses:   Information for the patient's :  Stuart Lockhart [[id number]]   Delivery of a 7 lb 6 oz (3.345 kg) male infant via Vaginal, Spontaneous Delivery on 2018 at 2:43 AM  by . Apgars were 8 and 9.        Additional Diagnoses:   Hospital Problems as of 2018  Date Reviewed: 2018          Codes Class Noted - Resolved POA    Normal labor ICD-10-CM: O80, Z37.9  ICD-9-CM: 319  2018 - Present Unknown             Lab Results   Component Value Date/Time    Rubella, External Immune 2018    GrBStrep, External Negative  2018    ABO,Rh O Positive  2018      Immunization History   Administered Date(s) Administered    Influenza Vaccine (Quad) PF 2016, 2018       * Procedures:   * No surgery found *      [place]  Depression Scale  I have been able to laugh and see the funny side of things: As much as I always could  I have looked forward with enjoyment to things: As much as I ever did  I have blamed myself unnecessarily when things went wrong: No, never  I have been anxious or worried for no good reason: No, not at all  I have felt scared or panicky for no very good reason: No, not much  Things have been getting on top of me: No, I have been coping as well as ever  I have been so unhappy that I have had difficulty sleeping: No, not at all  I have felt sad or miserable: No, not at all  I have been so unhappy that I have been crying: No, never  The thought of harming myself has occurred to me: Never  Total Score: 1    * Discharge Condition: good    * Hospital Course: Normal hospital course following the delivery. * Disposition: Home    Discharge Medications:   Current Discharge Medication List      START taking these medications    Details   ibuprofen (MOTRIN) 800 mg tablet Take 1 Tab by mouth every eight (8) hours. Qty: 90 Tab, Refills: 3             * Follow-up Care/Patient Instructions:   Activity: Activity as tolerated  Diet: Regular Diet  Wound Care: Keep wound clean and dry    Follow-up Information     Follow up With Details Comments [address] [facility], [address] [facility]  [address]  [phone]             Signed By:  Erlin Ambriz CNM     August 31, 2018

## 2018-08-31 NOTE — DISCHARGE INSTRUCTIONS
After Your Delivery (the Postpartum Period): Care Instructions  Your Care Instructions    Congratulations on the birth of your baby. Like pregnancy, the  period can be a time of excitement, raeann, and exhaustion. You may look at your wondrous little baby and feel happy. You may also be overwhelmed by your new sleep hours and new responsibilities. At first, babies often sleep during the days and are awake at night. They do not have a pattern or routine. They may make sudden gasps, jerk themselves awake, or look like they have crossed eyes. These are all normal, and they may even make you smile. In these first weeks after delivery, try to take good care of yourself. It may take 4 to 6 weeks to feel like yourself again, and possibly longer if you had a  birth. You will likely feel very tired for several weeks. Your days will be full of ups and downs, but lots of raeann as well. Follow-up care is a key part of your treatment and safety. Be sure to make and go to all appointments, and call your doctor if you are having problems. It's also a good idea to know your test results and keep a list of the medicines you take. How can you care for yourself at home? Take care of your body after delivery  · Use pads instead of tampons for the bloody flow that may last as long as 2 weeks. · Ease cramps with ibuprofen (Advil, Motrin). · Ease soreness of hemorrhoids and the area between your vagina and rectum with ice compresses or witch hazel pads. · Ease constipation by drinking lots of fluid and eating high-fiber foods. Ask your doctor about over-the-counter stool softeners. · Cleanse yourself with a gentle squeeze of warm water from a bottle instead of wiping with toilet paper. · Take a sitz bath in warm water several times a day. · Wear a good nursing bra. Ease sore and swollen breasts with warm, wet washcloths. · If you are not breastfeeding, use ice rather than heat for breast soreness.   · Your period may not start for several months if you are breastfeeding. You may bleed more, and longer at first, than you did before you got pregnant. · Wait until you are healed (about 4 to 6 weeks) before you have sexual intercourse. Your doctor will tell you when it is okay to have sex. · Try not to travel with your baby for 5 or 6 weeks. If you take a long car trip, make frequent stops to walk around and stretch. Avoid exhaustion  · Rest every day. Try to nap when your baby naps. · Ask another adult to be with you for a few days after delivery. · Plan for  if you have other children. · Stay flexible so you can eat at odd hours and sleep when you need to. Both you and your baby are making new schedules. · Plan small trips to get out of the house. Change can make you feel less tired. · Ask for help with housework, cooking, and shopping. Remind yourself that your job is to care for your baby. Know about help for postpartum depression  · \"Baby blues\" are common for the first 1 to 2 weeks after birth. You may cry or feel sad or irritable for no reason. · Rest whenever you can. Being tired makes it harder to handle your emotions. · Go for walks with your baby. · Talk to your partner, friends, and family about your feelings. · If your symptoms last for more than a few weeks, or if you feel very depressed, ask your doctor for help. · Postpartum depression can be treated. Support groups and counseling can help. Sometimes medicine can also help. Stay healthy  · Eat healthy foods so you have more energy, make good breast milk, and lose extra baby pounds. · If you breastfeed, avoid alcohol and drugs. If you quit smoking during pregnancy, try to stay smoke-free. · Start daily exercise after 4 to 6 weeks, but rest when you feel tired. · Learn exercises to tone your belly. Do Kegel exercises to regain strength in your pelvic muscles. You can do these exercises while you stand or sit.   ¨ Squeeze the same muscles you would use to stop your urine. Your belly and thighs should not move. ¨ Hold the squeeze for 3 seconds, and then relax for 3 seconds. ¨ Start with 3 seconds. Then add 1 second each week until you are able to squeeze for 10 seconds. ¨ Repeat the exercise 10 to 15 times for each session. Do three or more sessions each day. · Find a class for new mothers and new babies that has an exercise time. · If you had a  birth, give yourself a bit more time before you exercise, and be careful. When should you call for help? Call 911 anytime you think you may need emergency care. For example, call if:    · You passed out (lost consciousness).    Call your doctor now or seek immediate medical care if:    · You have severe vaginal bleeding. This means you are passing blood clots and soaking through a pad each hour for 2 or more hours.     · You are dizzy or lightheaded, or you feel like you may faint.     · You have a fever.     · You have new belly pain, or your pain gets worse.    Watch closely for changes in your health, and be sure to contact your doctor if:    · Your vaginal bleeding seems to be getting heavier.     · You have new or worse vaginal discharge.     · You feel sad, anxious, or hopeless for more than a few days.     · You do not get better as expected. Where can you learn more? Go to http://marlen-elly.info/. Enter A461 in the search box to learn more about \"After Your Delivery (the Postpartum Period): Care Instructions. \"  Current as of: 2017  Content Version: 11.7  © 9373-5629 [employer], [employer]. Care instructions adapted under license by [employer] (which disclaims liability or warranty for this information). If you have questions about a medical condition or this instruction, always ask your healthcare professional. [address] any warranty or liability for your use of this information.

## 2018-08-31 NOTE — ROUTINE PROCESS
Bedside and Verbal shift change report given to MIGEL Hernandez (oncoming nurse) by Sepideh Kang (offgoing nurse). Report included the following information SBAR, Kardex, Procedure Summary, Intake/Output, MAR and Recent Results.

## 2018-08-31 NOTE — PROGRESS NOTES
Post-Partum Day Number 2 Progress Note    Frances Javier     Assessment: Doing well, post partum day 2, voiding and eating well, desires d/c  Plan:   1. Discharge home today  2. Follow up in office in 6 weeks with Mason Gaxiolaer, Jenaro Resides or CNM  3. Post partum activity advised, diet as tolerated  4. Discharge Medications: ibuprofen, percocet and medications prior to admission    Information for the patient's :  Noah Marie [[id number]]   Vaginal, Spontaneous Delivery   Patient doing well without significant complaint. Voiding without difficulty, normal lochia. Vitals:  Visit Vitals    /70 (BP 1 Location: Left arm, BP Patient Position: At rest;Sitting)    Pulse (!) 59    Temp 97.5 °F (36.4 °C)    Resp 14    Ht 5' 3\" (1.6 m)    Wt 125 lb (56.7 kg)    LMP 10/30/2017    Breastfeeding No    BMI 22.14 kg/m2     Temp (24hrs), Av.7 °F (36.5 °C), Min:97.5 °F (36.4 °C), Max:97.8 °F (36.6 °C)      Exam:         Patient without distress. Abdomen soft, fundus firm, nontender                 Lower extremities are negative for swelling, cords or tenderness.     Labs:     Lab Results   Component Value Date/Time    WBC 7.2 2018 01:15 AM    WBC 6.4 05/10/2018 01:57 PM    WBC 5.8 2018 04:15 PM    WBC 10.7 2017 04:19 PM    WBC 4.5 2017 10:41 AM    WBC 4.3 2016 11:03 AM    WBC 5.1 2014 10:01 AM    HGB 11.7 2018 01:15 AM    HGB 11.2 05/10/2018 01:57 PM    HGB 12.3 2018 04:15 PM    HGB 12.3 2017 04:19 PM    HGB 10.4 (L) 2017 10:41 AM    HGB 11.7 2016 11:03 AM    HGB 13.3 2014 10:01 AM    HCT 34.3 (L) 2018 01:15 AM    HCT 33.1 (L) 05/10/2018 01:57 PM    HCT 36.6 2018 04:15 PM    HCT 36.5 2017 04:19 PM    HCT 30.2 (L) 2017 10:41 AM    HCT 34.7 2016 11:03 AM    HCT 40.8 2014 10:01 AM    PLATELET 376  01:15 AM    PLATELET 327  01:57 PM    PLATELET 198  04:15 PM    PLATELET 633 76/18/8614 04:19 PM    PLATELET 120 48/58/2374 10:41 AM    PLATELET 234 94/01/1864 11:03 AM    PLATELET 617 14/96/4205 10:01 AM    Hgb, External 11.2 05/10/2018    Hgb, External 12.3 01/25/2018    Hgb, External 10.4 03/23/2017    Hgb, External 11.7 12/22/2016    Hct, External 33.1 05/10/2018    Hct, External 36.6 01/25/2018    Hct, External 30.2 03/23/2017    Hct, External 34.7 12/22/2016    Platelet cnt., External 236 05/10/2018    Platelet cnt., External 275 01/25/2018    Platelet cnt., External 229 03/23/2017    Platelet cnt., External 251 12/22/2016       No results found for this or any previous visit (from the past 24 hour(s)).

## 2018-09-11 ENCOUNTER — TELEPHONE (OUTPATIENT)
Dept: OBGYN CLINIC | Age: 33
End: 2018-09-11

## 2018-09-11 RX ORDER — FLUCONAZOLE 150 MG/1
150 TABLET ORAL DAILY
Qty: 1 TAB | Refills: 0 | Status: SHIPPED | OUTPATIENT
Start: 2018-09-11 | End: 2018-09-12

## 2018-09-11 NOTE — TELEPHONE ENCOUNTER
Patient advised of MD recommendations and patient requested prescription to be sent for the Diflucan. Prescription sent as per MD order to patient preferred pharmacy. Patient advised to call the office back if her symptoms do not improve after the treatment.       Patient verbalized understanding

## 2018-09-11 NOTE — TELEPHONE ENCOUNTER
Call received at 3:34PM      29year old patient delivered on 18 , . Patient is 13 days post partum with a second degree perineal laceration. Patient calling to say that she has a yeast infection that started two days ago with itching and some white discharge. Patient is wondering ifshe can use the over the counter  Monistat. ?  Ov    Please advise

## 2018-09-11 NOTE — TELEPHONE ENCOUNTER
Can use OTC Monistat 3 or 7 day (don't recommend the 1-day). Or can send eRX for Diflucan 150mg x1, or Terazol 3 or 7.

## 2018-10-19 ENCOUNTER — OFFICE VISIT (OUTPATIENT)
Dept: OBGYN CLINIC | Age: 33
End: 2018-10-19

## 2018-10-19 VITALS
DIASTOLIC BLOOD PRESSURE: 57 MMHG | HEART RATE: 73 BPM | BODY MASS INDEX: 18.96 KG/M2 | HEIGHT: 63 IN | WEIGHT: 107 LBS | SYSTOLIC BLOOD PRESSURE: 93 MMHG

## 2018-10-19 NOTE — PROGRESS NOTES
Postpartum evaluation    Dana Restrepo is a 29 y.o. female who presents for a postpartum exam.     She is now six weeks post normal spontaneous vaginal delivery on 8/29/18 - Baby boy Terrie Winston. 3hr labor. Delivered at [facility] with midwife. Her baby is doing well. She has had no menses since delivery. She has had the following significant problems since her delivery: none    The patient is breastfeeding without difficulty. The patient would not like birth control. She is currently taking: no medications. She is due for her next AE in Jan. Sch'd for 1/28/19 at 0900. Visit Vitals  BP 93/57   Pulse 73   Ht 5' 3\" (1.6 m)   Wt 107 lb (48.5 kg)   Breastfeeding?  Yes   BMI 18.95 kg/m²       PHYSICAL EXAMINATION    Constitutional  · Appearance: well-nourished, well developed, alert, in no acute distress    HENT  · Head and Face: appears normal    Neck  · Inspection/Palpation: normal appearance, no masses or tenderness  · Lymph Nodes: no lymphadenopathy present  · Thyroid: gland size normal, nontender, no nodules or masses present on palpation  -----    Gastrointestinal  · Abdominal Examination: abdomen non-tender to palpation, normal bowel sounds, no masses present  · Liver and spleen: no hepatomegaly present, spleen not palpable  · Hernias: no hernias identified    Genitourinary  · External Genitalia: normal appearance for age, no discharge present, no tenderness present, no inflammatory lesions present, no masses present, no atrophy present  · Vagina: normal vaginal vault without central or paravaginal defects, no discharge present, no inflammatory lesions present, no masses present  · Bladder: non-tender to palpation  · Urethra: appears normal  · Cervix: normal   · Uterus: normal size, shape and consistency  · Adnexa: no adnexal tenderness present, no adnexal masses present  · Perineum: perineum within normal limits, no evidence of trauma, no rashes or skin lesions present  · Anus: anus within normal limits, no hemorrhoids present  · Inguinal Lymph Nodes: no lymphadenopathy present    Skin  · General Inspection: no rash, no lesions identified    Neurologic/Psychiatric  · Mental Status:  · Orientation: grossly oriented to person, place and time  · Mood and Affect: mood normal, affect appropriate    Assessment:  Normal postpartum check    Plan:  RTO for AE, 1/20/19

## 2018-10-19 NOTE — PATIENT INSTRUCTIONS
[employer]® Desk: [phone]       Postpartum: Care Instructions  Your Care Instructions  After childbirth (postpartum period), your body goes through many changes. Some of these changes happen over several weeks. In the hours after delivery, your body will begin to recover from childbirth while it prepares to breastfeed your . You may feel emotional during this time. Your hormones can shift your mood without warning for no clear reason. In the first couple of weeks after childbirth, many women have emotions that change from happy to sad. You may find it hard to sleep. You may cry a lot. This is called the \"baby blues. \" These overwhelming emotions often go away within a couple of days or weeks. But it's important to discuss your feelings with your doctor. It is easy to get too tired and overwhelmed during the first weeks after childbirth. Don't try to do too much. Get rest whenever you can, accept help from others, and eat well and drink plenty of fluids. About 4 to 6 weeks after your baby's birth, you will have a follow-up visit with your doctor. This visit is your time to talk to your doctor about anything you are concerned or curious about. Follow-up care is a key part of your treatment and safety. Be sure to make and go to all appointments, and call your doctor if you are having problems. It's also a good idea to know your test results and keep a list of the medicines you take. How can you care for yourself at home? · Sleep or rest when your baby sleeps. · Get help with household chores from family or friends, if you can. Do not try to do it all yourself. · If you have hemorrhoids or swelling or pain around the opening of your vagina, try using cold and heat. You can put ice or a cold pack on the area for 10 to 20 minutes at a time. Put a thin cloth between the ice and your skin. Also try sitting in a few inches of warm water (sitz bath) 3 times a day and after bowel movements.   · Take pain medicines exactly as directed. ? If the doctor gave you a prescription medicine for pain, take it as prescribed. ? If you are not taking a prescription pain medicine, ask your doctor if you can take an over-the-counter medicine. · Eat more fiber to avoid constipation. Include foods such as whole-grain breads and cereals, raw vegetables, raw and dried fruits, and beans. · Drink plenty of fluids, enough so that your urine is light yellow or clear like water. If you have kidney, heart, or liver disease and have to limit fluids, talk with your doctor before you increase the amount of fluids you drink. · Do not rinse inside your vagina with fluids (douche). · If you have stitches, keep the area clean by pouring or spraying warm water over the area outside your vagina and anus after you use the toilet. · Keep a list of questions to bring to your postpartum visit. Your questions might be about:  ? Changes in your breasts, such as lumps or soreness. ? When to expect your menstrual period to start again. ? What form of birth control is best for you. ? Weight you have put on during the pregnancy. ? Exercise options. ? What foods and drinks are best for you, especially if you are breastfeeding. ? Problems you might be having with breastfeeding. ? When you can have sex. Some women may want to talk about lubricants for the vagina. ? Any feelings of sadness or restlessness that you are having. When should you call for help? Call 911 anytime you think you may need emergency care.  For example, call if:    · You have thoughts of harming yourself, your baby, or another person.     · You passed out (lost consciousness).    Call your doctor now or seek immediate medical care if:    · Your vaginal bleeding seems to be getting heavier.     · You are dizzy or lightheaded, or you feel like you may faint.     · You have a fever.    Watch closely for changes in your health, and be sure to contact your doctor if:    · You have new or worse vaginal discharge.     · You feel sad or depressed.     · You are having problems with your breasts or breastfeeding. Where can you learn more? Go to http://marlen-elly.info/. Enter L077 in the search box to learn more about \"Postpartum: Care Instructions. \"  Current as of: November 21, 2017  Content Version: 11.8  © 4468-8236 [employer]. Care instructions adapted under license by [email or web address] (which disclaims liability or warranty for this information). If you have questions about a medical condition or this instruction, always ask your healthcare professional. [place] [address] any warranty or liability for your use of this information.

## 2018-10-23 ENCOUNTER — OFFICE VISIT (OUTPATIENT)
Dept: URGENT CARE | Age: 33
End: 2018-10-23

## 2018-10-23 VITALS
HEART RATE: 97 BPM | DIASTOLIC BLOOD PRESSURE: 69 MMHG | OXYGEN SATURATION: 98 % | HEIGHT: 63 IN | BODY MASS INDEX: 18.78 KG/M2 | SYSTOLIC BLOOD PRESSURE: 105 MMHG | TEMPERATURE: 98.6 F | WEIGHT: 106 LBS | RESPIRATION RATE: 18 BRPM

## 2018-10-23 DIAGNOSIS — N61.0 MASTITIS: Primary | ICD-10-CM

## 2018-10-23 DIAGNOSIS — E86.0 DEHYDRATION: ICD-10-CM

## 2018-10-23 DIAGNOSIS — R53.83 FATIGUE, UNSPECIFIED TYPE: ICD-10-CM

## 2018-10-23 LAB
ANION GAP BLD CALC-SCNC: 17 MMOL/L
BUN BLD-MCNC: 18 MG/DL
CHLORIDE BLD-SCNC: 100 MMOL/L
CO2 BLD-SCNC: 29 MMOL/L
CREAT BLD-MCNC: 0.8 MG/DL (ref 0.6–1.3)
GLUCOSE BLD STRIP.AUTO-MCNC: 89 MG/DL
HCT VFR BLD CALC: 42 %
HGB BLD-MCNC: NORMAL G/DL
IONIZED CALCIUM ISTA,ICAI: 1.28
POTASSIUM BLD-SCNC: 4 MMOL/L
SODIUM BLD-SCNC: 140 MMOL/L

## 2018-10-23 RX ORDER — CEPHALEXIN 500 MG/1
500 CAPSULE ORAL 4 TIMES DAILY
Qty: 40 CAP | Refills: 0 | Status: SHIPPED | OUTPATIENT
Start: 2018-10-23 | End: 2018-11-02

## 2018-10-23 NOTE — PATIENT INSTRUCTIONS
Continue breastfeeding/pumping  Warm compresses  Follow up with PCP  ER if worse     Mastitis: Care Instructions  Your Care Instructions  Mastitis is an inflammation of the breast. It occurs most often in women who are breastfeeding, but it can affect any woman. Mastitis can be caused by poor milk flow from the breast. When milk builds up in a breast, it leaks into the nearby breast tissue. Infection can also develop when the nipples become cracked or irritated. The tissue can then become infected with bacteria. Antibiotics can usually cure mastitis. For women who are nursing, continued breastfeeding (or pumping) can help. If mastitis is not treated, a pocket of pus may form in the breast and need to be drained. Follow-up care is a key part of your treatment and safety. Be sure to make and go to all appointments, and call your doctor if you are having problems. It's also a good idea to know your test results and keep a list of the medicines you take. How can you care for yourself at home? · If your doctor prescribed antibiotics, take them as directed. Do not stop taking them just because you feel better. You need to take the full course of antibiotics. · If you are breastfeeding, continue breastfeeding or pumping breast milk. It is important to empty your breasts regularly, every 2 to 3 hours while you are awake. These tips may help:  ? Before breastfeeding, place a warm, wet washcloth over your breast for about 15 minutes. Try this at least 3 times a day. This increases milk flow in the breast. Massaging the affected breast may also increase milk flow. ? Breastfeed on both sides. Try to start with your healthy breast first. Then, after your milk is flowing, breastfeed from the affected breast until it feels soft. You should empty this breast completely. Then switch back to the healthy breast and breastfeed until your baby has finished.   ? Pump or hand-express a small amount of breast milk before breastfeeding if your breasts are too full with milk. This will make your breasts less full and may make it easier for your baby to latch on to your breast.  ? Pump or express milk from the affected breast if it hurts too much to breastfeed. · Take an over-the-counter pain medicine, such as acetaminophen (Tylenol) or ibuprofen (Advil, Motrin) to relieve pain and fever. Read and follow all instructions on the label. · Do not take two or more pain medicines at the same time unless the doctor told you to. Many pain medicines have acetaminophen, which is Tylenol. Too much acetaminophen (Tylenol) can be harmful. · Rest as much as possible. · Drink extra fluids. · If pus is draining from your infected breast, wash the nipple gently and let it air-dry before you put your bra back on. A disposable breast pad placed in the bra cup may absorb the pus. When should you call for help? Call your doctor now or seek immediate medical care if:    · You have worse symptoms of a breast infection, such as:  ? Increased pain, swelling, redness, or warmth around a breast.  ? Red streaks leading from a breast.  ? Pus draining from a breast.  ? A fever.    Watch closely for changes in your health, and be sure to contact your doctor if:    · You do not get better as expected. Where can you learn more? Go to http://marlen-elly.info/. Enter Y207 in the search box to learn more about \"Mastitis: Care Instructions. \"  Current as of: November 21, 2017  Content Version: 11.8  © 1905-8966 [employer]. Care instructions adapted under license by [employer] (which disclaims liability or warranty for this information). If you have questions about a medical condition or this instruction, always ask your healthcare professional. [place] [address] any warranty or liability for your use of this information.          Dehydration: Care Instructions  Your Care Instructions  Dehydration happens when your body loses too much fluid. This might happen when you do not drink enough water or you lose large amounts of fluids from your body because of diarrhea, vomiting, or sweating. Severe dehydration can be life-threatening. Water and minerals called electrolytes help put your body fluids back in balance. Learn the early signs of fluid loss, and drink more fluids to prevent dehydration. Follow-up care is a key part of your treatment and safety. Be sure to make and go to all appointments, and call your doctor if you are having problems. It's also a good idea to know your test results and keep a list of the medicines you take. How can you care for yourself at home? · To prevent dehydration, drink plenty of fluids, enough so that your urine is light yellow or clear like water. Choose water and other caffeine-free clear liquids until you feel better. If you have kidney, heart, or liver disease and have to limit fluids, talk with your doctor before you increase the amount of fluids you drink. · If you do not feel like eating or drinking, try taking small sips of water, sports drinks, or other rehydration drinks. · Get plenty of rest.  To prevent dehydration  · Add more fluids to your diet and daily routine, unless your doctor has told you not to. · During hot weather, drink more fluids. Drink even more fluids if you exercise a lot. Stay away from drinks with alcohol or caffeine. · Watch for the symptoms of dehydration. These include:  ? A dry, sticky mouth. ? Dark yellow urine, and not much of it. ? Dry and sunken eyes. ? Feeling very tired. · Learn what problems can lead to dehydration. These include:  ? Diarrhea, fever, and vomiting. ? Any illness with a fever, such as pneumonia or the flu. ? Activities that cause heavy sweating, such as endurance races and heavy outdoor work in hot or humid weather. ?  Alcohol or drug abuse or withdrawal.  ? Certain medicines, such as cold and allergy pills (antihistamines), diet pills (diuretics), and laxatives. ? Certain diseases, such as diabetes, cancer, and heart or kidney disease. When should you call for help? Call 911 anytime you think you may need emergency care. For example, call if:    · You passed out (lost consciousness).    Call your doctor now or seek immediate medical care if:    · You are confused and cannot think clearly.     · You are dizzy or lightheaded, or you feel like you may faint.     · You have signs of needing more fluids. You have sunken eyes and a dry mouth, and you pass only a little dark urine.     · You cannot keep fluids down.    Watch closely for changes in your health, and be sure to contact your doctor if:    · You are not making tears.     · Your skin is very dry and sags slowly back into place after you pinch it.     · Your mouth and eyes are very dry. Where can you learn more? Go to http://marlen-elly.info/. Enter T970 in the search box to learn more about \"Dehydration: Care Instructions. \"  Current as of: November 20, 2017  Content Version: 11.8  © 3302-9454 [employer]. Care instructions adapted under license by [employer] (which disclaims liability or warranty for this information). If you have questions about a medical condition or this instruction, always ask your healthcare professional. [address] any warranty or liability for your use of this information.

## 2018-10-23 NOTE — PROGRESS NOTES
Martin Bhatia presents with worsening left breast pain for the past 2 days, now with right breast pain today. Currently breastfeeding a 10 week old infant. Denies fevers, chills. Woke up this morning feeling \"foggy\" and decreased ability to focus. Reports has not been eating and drinking well. Denies dizziness, vision change, N/V, bloody/purulent nipple drainage. The history is provided by the patient.         Past Medical History:   Diagnosis Date    Anemia     During pregnancy, taking Iron    Kidney stone     Routine Papanicolaou smear 07/20/2016    Negative (no hpv) from [address], blistering         Past Surgical History:   Procedure Laterality Date    HX CHOLECYSTECTOMY  07/2010    HX OTHER SURGICAL  2011    cholecystectomy    HX WISDOM TEETH EXTRACTION  2009         Family History   Problem Relation Age of Onset    Thyroid Disease Mother     Cancer Father         Prostate     Thyroid Disease Maternal Grandmother     Thyroid Disease Other         6 Siblings of Mother h/o Hyperthyroidism         Social History     Socioeconomic History    Marital status:      Spouse name: Not on file    Number of children: Not on file    Years of education: Not on file    Highest education level: Not on file   Social Needs    Financial resource strain: Not on file    Food insecurity - worry: Not on file    Food insecurity - inability: Not on file   [employer] needs - medical: Not on file   [employer] needs - non-medical: Not on file   Occupational History    Not on file   Tobacco Use    Smoking status: Never Smoker    Smokeless tobacco: Never Used    Tobacco comment: Never used vapor or e-cigs    Substance and Sexual Activity    Alcohol use: No    Drug use: No    Sexual activity: Yes     Partners: Male     Birth control/protection: None   Other Topics Concern    Not on file   Social History Narrative    Not on file                ALLERGIES: Amoxicillin    Review of Systems Constitutional: Positive for activity change, appetite change and fatigue. Negative for chills and fever. Respiratory: Negative for shortness of breath and wheezing. Cardiovascular: Negative for chest pain and palpitations. Skin: Positive for color change. Neurological: Negative for dizziness and headaches. Vitals:    10/23/18 1547   BP: 105/69   Pulse: 97   Resp: 18   Temp: 98.6 °F (37 °C)   SpO2: 98%   Weight: 106 lb (48.1 kg)   Height: 5' 3\" (1.6 m)       Physical Exam   Constitutional: She appears well-developed and well-nourished. No distress. Cardiovascular: Normal rate, regular rhythm and normal heart sounds. Pulmonary/Chest: Effort normal and breath sounds normal. No respiratory distress. She has no wheezes. She has no rales. Right breast exhibits tenderness. Right breast exhibits no inverted nipple, no nipple discharge and no skin change. Left breast exhibits skin change (slight erythema noted at 3oclock) and tenderness. Left breast exhibits no inverted nipple and no nipple discharge. Neurological: She is alert. Skin: She is not diaphoretic. Psychiatric: She has a normal mood and affect. Her behavior is normal. Judgment and thought content normal.   Nursing note and vitals reviewed. MDM    ICD-10-CM ICD-9-CM    1. Mastitis N61.0 611.0    2. Fatigue, unspecified type R53.83 780.79 AMB POC ISTAT CHEM 8+   3. Dehydration E86.0 276.51      Medications Ordered Today   Medications    sodium chloride 0.9 % bolus infusion 1,000 mL    cephALEXin (KEFLEX) 500 mg capsule     Sig: Take 1 Cap by mouth four (4) times daily for 10 days. Dispense:  40 Cap     Refill:  0     Increase fluids  Warm compresses  Continue breastfeeding/pumping    The patients condition was discussed with the patient and they understand. The patient is to follow up with PCP. If signs and symptoms become worse the pt is to go to the ER. The patient is to take medications as prescribed.      Results for orders placed or performed in visit on 10/23/18   AMB POC ISTAT CHEM 8+   Result Value Ref Range    Sodium,  MMOL/L    Potassium, POC 4.0 MMOL/L    Chloride,  MMOL/L    Calcium, ionized (POC) 1.28     CO2, POC 29 MMOL/L    Glucose, POC 89 MG/DL    BUN, POC 18 MG/DL    Creatinine, POC 0.8 0.6 - 1.3 MG/DL    Hematocrit, POC 42 %    Hemoglobin, POC  G/DL    Anion gap, POC 17 MMOL/L           Procedures

## 2018-12-20 ENCOUNTER — TELEPHONE (OUTPATIENT)
Dept: OBGYN CLINIC | Age: 33
End: 2018-12-20

## 2018-12-20 NOTE — TELEPHONE ENCOUNTER
Sounds like she will ultimately need to see general surgery. She can start with her PCP - or at least see if they want to see her first.  If insurance requires referral, it will need to come from them.

## 2018-12-20 NOTE — TELEPHONE ENCOUNTER
Patient called, left  on [facility] Triage line stating that after having her last baby on 8/29/2018, she developed an umbilical hernia and now she sees that there is a dent where it was located, not gone away and now is slightly uncomfortable. Should patient see surgeon or PCP?

## 2018-12-21 ENCOUNTER — OFFICE VISIT (OUTPATIENT)
Dept: FAMILY MEDICINE CLINIC | Age: 33
End: 2018-12-21

## 2018-12-21 VITALS
WEIGHT: 104.5 LBS | BODY MASS INDEX: 18.52 KG/M2 | RESPIRATION RATE: 18 BRPM | TEMPERATURE: 97.7 F | HEIGHT: 63 IN | HEART RATE: 77 BPM | OXYGEN SATURATION: 98 % | SYSTOLIC BLOOD PRESSURE: 88 MMHG | DIASTOLIC BLOOD PRESSURE: 61 MMHG

## 2018-12-21 DIAGNOSIS — R10.33 PERIUMBILICAL ABDOMINAL PAIN: Primary | ICD-10-CM

## 2018-12-21 NOTE — PROGRESS NOTES
Ginny Medina  Identified pt with two pt identifiers(name and ). Chief Complaint   Patient presents with    Umbilical Hernia     rm [id number] hernia protruding at navel       1. Have you been to the ER, urgent care clinic since your last visit? Yes 2018 [facility], dehydration Hospitalized since your last visit? no    2. Have you seen or consulted any other health care providers outside of the [address] [place] [address] since your last visit? Include any pap smears or colon screening. no      Advance Care Planning    In the event something were to happen to you and you were unable to speak on your behalf, do you have an Advance Directive/ Living Will in place stating your wishes? NO    If yes, do we have a copy on file NO    If no, would you like information NO    Medication reconciliation up to date and corrected with patient at this time. Today's provider has been notified of reason for visit, vitals and flowsheets obtained on patients. Reviewed record in preparation for visit, huddled with provider and have obtained necessary documentation. Health Maintenance Due   Topic    DTaP/Tdap/Td series (1 - Tdap)    Influenza Age 5 to Adult        Wt Readings from Last 3 Encounters:   10/23/18 106 lb (48.1 kg)   10/19/18 107 lb (48.5 kg)   18 125 lb (56.7 kg)     Temp Readings from Last 3 Encounters:   10/23/18 98.6 °F (37 °C)   18 98.1 °F (36.7 °C)   18 97.6 °F (36.4 °C)     BP Readings from Last 3 Encounters:   10/23/18 105/69   10/19/18 93/57   18 115/71     Pulse Readings from Last 3 Encounters:   10/23/18 97   10/19/18 73   18 73     There were no vitals filed for this visit.       Learning Assessment:  :     Learning Assessment 2018   PRIMARY LEARNER Patient   HIGHEST LEVEL OF EDUCATION - PRIMARY LEARNER  > 4 YEARS OF COLLEGE   PRIMARY LANGUAGE ENGLISH   LEARNER PREFERENCE PRIMARY DEMONSTRATION   ANSWERED BY patient    RELATIONSHIP SELF Depression Screening:  :     PHQ over the last two weeks 10/19/2018   PHQ Not Done (No Data)   Little interest or pleasure in doing things Not at all   Feeling down, depressed, irritable, or hopeless Not at all   Total Score PHQ 2 0       No flowsheet data found. Fall Risk Assessment:  :     No flowsheet data found. Abuse Screening:  :     No flowsheet data found. ADL Screening:  :     No flowsheet data found. BMI:  Weight Metrics 10/23/2018 10/19/2018 8/29/2018 8/24/2018 8/15/2018 8/8/2018 8/3/2018   Weight 106 lb 107 lb 125 lb 129 lb 128 lb 126 lb 9.6 oz 125 lb   BMI 18.78 kg/m2 18.95 kg/m2 22.14 kg/m2 22.85 kg/m2 22.67 kg/m2 22.43 kg/m2 22.14 kg/m2           Medication reconciliation up to date and corrected with patient at this time.

## 2018-12-21 NOTE — PROGRESS NOTES
S: Td Majano is a 29 y.o. female who presents with     Assessment/Plan:  1. Periumbilical abdominal pain  - umbilical hernia noted postpartum  - US ABD COMP; Future  - REFERRAL TO GENERAL SURGERY    2. Low BP reading  -discussed good hydration    3.  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, sits in classroom  -supportive tx instructions given       HPI:  8/29/18 - vaginal birth of baby boy  Had an umbilical hernia and now feeling pain  BP = 88/61  Thinks pregnancy had something to do with hernia - umbilical was short - didn't realize this until birth  Could feel a bump  Yesterday it went down - this am had aching and deep pain and a bit in LRQ    No F/C/N/V  Urine:dark yellow to light yellow     BP = 88/61  usually drinking 4 hospital cups a day but last     PHQ over the last two weeks 10/19/2018   PHQ Not Done (No Data)   Little interest or pleasure in doing things Not at all   Feeling down, depressed, irritable, or hopeless Not at all   Total Score PHQ 2 0       Social History:  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[facility], has 2 1/2 years left in program  Nutrition:healthy diet     Social History     Tobacco Use   Smoking Status Never Smoker   Smokeless Tobacco Never Used   Tobacco Comment    Never used vapor or e-cigs      Social History     Substance and Sexual Activity   Alcohol Use No     Social History     Substance and Sexual Activity   Drug Use No       Review of Systems:  - Constitutional Symptoms: no fevers, chills, weight loss  - Cardiovascular: no chest pain or palpitations  - Respiratory: no cough or shortness of breath  - Neurological: no numbness, tingling, or headaches  - Psychiatric: no depression or anxiety    I reviewed the following:  Past Medical History:   Diagnosis Date    Anemia     During pregnancy, taking Iron    Kidney stone     Routine Papanicolaou smear 07/20/2016    Negative (no hpv) from [address] Current Outpatient Medications   Medication Sig Dispense Refill    ibuprofen (MOTRIN) 800 mg tablet Take 1 Tab by mouth every eight (8) hours. 90 Tab 3    terconazole (TERAZOL 3) 0.8 % vaginal cream Insert 1 Applicator into vagina nightly. 20 g 0    IRON, FERROUS SULFATE, PO Take  by mouth.  prenatal multivit-ca-min-fe-fa tab Take  by mouth. Allergies   Allergen Reactions    Amoxicillin Rash       O: VS:   Visit Vitals  BP (!) 88/61 (BP 1 Location: Right arm, BP Patient Position: Sitting)   Pulse 77   Temp 97.7 °F (36.5 °C) (Oral)   Resp 18   Ht 5' 3\" (1.6 m)   Wt 104 lb 8 oz (47.4 kg)   SpO2 98%   BMI 18.51 kg/m²         GENERAL: Barb Watkins  is  in no acute distress. Non-toxic. No signs of dehydration. EYE: PERRLA. EOMs intact. Sclera anicteric without injection. MOUTH: mucous membranes pink and moist. Posterior pharynx normal with cobblestone appearance. No erythema, white exudate or obstruction. RESP: Breath sounds are symmetrical bilaterally. Unlabored without SOB. Speaking in full sentences. Clear to auscultation bilaterally anteriorly and posteriorly. No wheezes. No rales or rhonchi. CV: normal rate. Regular rhythm. S1, S2 audible. No murmur noted. No rubs, clicks or gallops noted. ABDOMEN: Soft and nondistended. No masses or organomegaly. No rebound tenderness, rigidity or guarding. Bowel sounds normal x 4 quadrants. Midline mild tenderness on palpation. McBurney: negative     Rosving Sign: negative  Psoas Sign: negative Patiño's sign: negative  MUSC:  Straight Leg Test:  negative    Internal/External Leg Rotation - positive  HEME/LYMPH: peripheral pulses palpable 2+ x 4 extremities. No peripheral edema is noted. No cervical adenopathy noted. SKIN: Skin is warm and dry. Turgor is normal. No petechiae, no purpura, no rash. No cyanosis. No mottling, jaundice or pallor. Patient education was done.   Advised on nutrition, physical activity, tobacco, alcohol and safety. Counseling included discussion of diagnosis, differentials, treatment options, prescribed treatment, warning signs and follow up. Medication risks/benefits, interactions and alternatives discussed with patient.    ______________________________________________________________________  Patient verbalized understanding and agreed to plan of care. Patient was given an after visit summary which included current diagnoses, medications and vital signs.     Follow up if symptoms persist or worsen

## 2018-12-21 NOTE — PATIENT INSTRUCTIONS
1) Ab pain  An abdominal ultrasound is an imaging test that is used to look at the organs in the abdomen, including the liver, gallbladder, spleen, pancreas, and kidneys. The blood vessels that lead to some of these organs can also be visualized. This is a non-invasive test using sound waves to look at the abdomen. You lie down on a table and a clear conducting gel is applied to the abdomen. A technician will pass a tranducer (a handheld probe) over the abdomen which sends sound waves to a computer, creating pictures of the organs. The test usually takes 30 minutes. 2) Referral to Dr. Nupur Patricio, General Surgery. Please make an appointment with Dr. Ebonie Dang. [address] [facility] at [facility] at [address] [place] [address], [facility], [address] , [place], [facility]  Phone: [phone]    3) Blood pressure is a bit low today. Make sure you are drinking at least 80oz of water daily and getting good protein. Hernia: Care Instructions  Your Care Instructions    A hernia develops when tissue bulges through a weak spot in the wall of your belly. The groin area and the navel are common areas for a hernia. A hernia can also develop near the area of a surgery you had before. Pressure from lifting, straining, or coughing can tear the weak area, causing the hernia to bulge and be painful. If you cannot push a hernia back into place, the tissue may become trapped outside the belly wall. If the hernia gets twisted and loses its blood supply, it will swell and die. This is called a strangulated hernia. It usually causes a lot of pain. It needs treatment right away. Some hernias need to be repaired to prevent a strangulated hernia. If your hernia causes symptoms or is large, you may need surgery. Follow-up care is a key part of your treatment and safety. Be sure to make and go to all appointments, and call your doctor if you are having problems.  It's also a good idea to know your test results and keep a list of the medicines you take. How can you care for yourself at home? · Take care when lifting heavy objects. · Stay at a healthy weight. · Do not smoke. Smoking can cause coughing, which can cause your hernia to bulge. If you need help quitting, talk to your doctor about stop-smoking programs and medicines. These can increase your chances of quitting for good. · Talk with your doctor before wearing a corset or truss for a hernia. These devices are not recommended for treating hernias and sometimes can do more harm than good. There may be certain situations when your doctor thinks a truss would work, but these are rare. When should you call for help?   Call your doctor now or seek immediate medical care if:    · You have new or worse belly pain.     · You are vomiting.     · You cannot pass stools or gas.     · You cannot push the hernia back into place with gentle pressure when you are lying down.     · The area over the hernia turns red or becomes tender.

## 2018-12-28 ENCOUNTER — HOSPITAL ENCOUNTER (OUTPATIENT)
Dept: ULTRASOUND IMAGING | Age: 33
Discharge: HOME | End: 2018-12-28
Attending: NURSE PRACTITIONER
Payer: COMMERCIAL

## 2018-12-28 DIAGNOSIS — R10.33 PERIUMBILICAL ABDOMINAL PAIN: ICD-10-CM

## 2018-12-28 PROCEDURE — 76705 ECHO EXAM OF ABDOMEN: CPT

## 2018-12-31 NOTE — PROGRESS NOTES
Patient notified of results and recommendations from [facility], [address] via [employer] from [facility].

## 2019-01-03 ENCOUNTER — OFFICE VISIT (OUTPATIENT)
Dept: SURGERY | Age: 34
End: 2019-01-03

## 2019-01-03 VITALS
HEIGHT: 63 IN | HEART RATE: 70 BPM | TEMPERATURE: 97.6 F | BODY MASS INDEX: 18.43 KG/M2 | DIASTOLIC BLOOD PRESSURE: 68 MMHG | OXYGEN SATURATION: 98 % | RESPIRATION RATE: 16 BRPM | SYSTOLIC BLOOD PRESSURE: 110 MMHG | WEIGHT: 104 LBS

## 2019-01-03 DIAGNOSIS — K42.9 UMBILICAL HERNIA WITHOUT OBSTRUCTION AND WITHOUT GANGRENE: Primary | ICD-10-CM

## 2019-01-03 NOTE — PROGRESS NOTES
1. Have you been to the ER, urgent care clinic since your last visit? Hospitalized since your last visit? No 
 
2. Have you seen or consulted any other health care providers outside of the [address] [place] [address] since your last visit? Include any pap smears or colon screening.  No

## 2019-01-09 PROBLEM — K42.9 UMBILICAL HERNIA WITHOUT OBSTRUCTION AND WITHOUT GANGRENE: Status: ACTIVE | Noted: 2019-01-09

## 2019-01-09 NOTE — PROGRESS NOTES
[address] [facility] Surgery History and Physical 
 
Chief Complaint: umbilical hernia History of Present Illness:  
  
Madonna Long is a 29 y.o. female who was kindly referred by Andreas Horvath NP for evaluation of an umbilical hernia. The patient states she first noted this about 1-2 weeks ago. She has some pain when probing the area, but otherwise it is asymptomatic for her. She has not had any obstructive or incarceration symptoms. She has no history of prior hernias. She is recently post-partum. I have discussed with her and she is interested in having more children in the future. Past Medical History:  
Diagnosis Date  Anemia During pregnancy, taking Iron  Kidney stone  Routine Papanicolaou smear 07/20/2016 Negative (no hpv) from [address]  
 Sunburn, blistering Past Surgical History:  
Procedure Laterality Date  HX CHOLECYSTECTOMY  07/2010  HX OTHER SURGICAL  2011  
 cholecystectomy [facility]  2009 Social History Socioeconomic History  Marital status:  Spouse name: Not on file  Number of children: Not on file  Years of education: Not on file  Highest education level: Not on file Social Needs  Financial resource strain: Not on file  Food insecurity - worry: Not on file  Food insecurity - inability: Not on file  Transportation needs - medical: Not on file  Transportation needs - non-medical: Not on file Occupational History  Not on file Tobacco Use  Smoking status: Never Smoker  Smokeless tobacco: Never Used  Tobacco comment: Never used vapor or e-cigs Substance and Sexual Activity  Alcohol use: No  
 Drug use: No  
 Sexual activity: Yes  
  Partners: Male Birth control/protection: None Other Topics Concern  Not on file Social History Narrative  Not on file Family History Problem Relation Age of Onset  Thyroid Disease Mother  Cancer Father Prostate  Thyroid Disease Maternal Grandmother  Thyroid Disease Other 6 Siblings of Mother h/o Hyperthyroidism Current Outpatient Medications:  
  prenatal multivit-ca-min-fe-fa tab, Take  by mouth., Disp: , Rfl:  
 
Allergies Allergen Reactions  Amoxicillin Rash ROS Constitutional: negative Ears, Nose, Mouth, Throat, and Face: negative Respiratory: negative Cardiovascular: negative Gastrointestinal: negative Genitourinary:negative Integument/Breast: negative Hematologic/Lymphatic: negative Behavioral/Psychiatric: negative Allergic/Immunologic: negative Physical Exam:  
 
Visit Vitals /68 (BP 1 Location: Left arm, BP Patient Position: Sitting) Pulse 70 Temp 97.6 °F (36.4 °C) (Oral) Resp 16 Ht 5' 3\" (1.6 m) Wt 104 lb (47.2 kg) SpO2 98% BMI 18.42 kg/m² General - alert and oriented, no apparent distress HEENT - NC/AT. No scleral icterus Pulm - CTAB, normal inspiratory effort CV - RRR, no M/R/G Abd - soft, NT, ND. She has a small, < 1 cm defect at the base of the umbilicus consistent with a hernia. No bowel involvement noted. Ext - warm, well perfused, no edema Lymphatics - no cervical, supraclavicular or inguinal adenopathy noted. Skin - supple, no rashes Psychiatric - normal affect, good mood Labs None Imaging 12/28/18 Abd U/S: FINDINGS: Sonography of the umbilicus shows a subcentimeter subcutaneous soft 
tissue area with minimal if any change during stress maneuvers including 
standing and Valsalva. Appearance suggests possible hernia which does not 
reduce, through a pinhole orifice. 
  
IMPRESSION IMPRESSION: Possible umbilical region nonreducing small hernia. I have reviewed and agree with all of the pertinent images Assessment:  
 
Rey Small is a 29 y.o. female with a small umbilical hernia. This is < 1 cm and easily reduced. She is relatively asymptomatic from this lesion. Recommendations:  
 
1. I have discussed repair with the patient but given that she plans to have more children, I have recommended she hold off until after that time as it will almost certainly recur when she is pregnant again in the future. I did discuss symptoms of incarceration and strangulation which would necessitate urgent/emergent repair. She will plan to follow up for repair of her hernia in the future when she is not planning for any further pregnancies. 20 mins of time was spent with the patient of which > 50% of the time involved face-to-face counseling of the patient regarding the proposed treatment plan. Otto Wood MD 
1/3/2019 CC: Serenity Montesinos NP

## 2019-03-06 ENCOUNTER — HOSPITAL ENCOUNTER (EMERGENCY)
Age: 34
Discharge: HOME | End: 2019-03-06
Attending: EMERGENCY MEDICINE
Payer: COMMERCIAL

## 2019-03-06 VITALS
HEART RATE: 59 BPM | SYSTOLIC BLOOD PRESSURE: 103 MMHG | OXYGEN SATURATION: 99 % | DIASTOLIC BLOOD PRESSURE: 70 MMHG | TEMPERATURE: 97.7 F | RESPIRATION RATE: 16 BRPM

## 2019-03-06 DIAGNOSIS — K22.2 ESOPHAGEAL OBSTRUCTION DUE TO FOOD IMPACTION: Primary | ICD-10-CM

## 2019-03-06 DIAGNOSIS — T18.128A ESOPHAGEAL OBSTRUCTION DUE TO FOOD IMPACTION: Primary | ICD-10-CM

## 2019-03-06 PROCEDURE — 74011250637 HC RX REV CODE- 250/637: Performed by: EMERGENCY MEDICINE

## 2019-03-06 PROCEDURE — 99283 EMERGENCY DEPT VISIT LOW MDM: CPT

## 2019-03-06 PROCEDURE — 96372 THER/PROPH/DIAG INJ SC/IM: CPT

## 2019-03-06 PROCEDURE — 74011000250 HC RX REV CODE- 250: Performed by: EMERGENCY MEDICINE

## 2019-03-06 PROCEDURE — 74011250636 HC RX REV CODE- 250/636: Performed by: EMERGENCY MEDICINE

## 2019-03-06 RX ADMIN — GLUCAGON HYDROCHLORIDE 2 MG: KIT at 02:15

## 2019-03-06 RX ADMIN — LIDOCAINE HYDROCHLORIDE 40 ML: 20 SOLUTION ORAL; TOPICAL at 02:14

## 2019-03-06 NOTE — ED TRIAGE NOTES
At 10pm she ate a large piece of kehinde. She felt it got stuck and since then continues to feel the need to swallow as if something is stuck. She has been able to drink.

## 2019-03-06 NOTE — ED PROVIDER NOTES
29 y.o. female with past medical history significant for anemia, kidney stone, status post cholecystectomy, presents ambulatory to the ED with chief complaint of difficulty swallowing. Patient reports that she ate a \"big piece of kehinde\" at [address] last night (3/5/19). States that she put the pice of kehinde into her mouth and it \"slid down\" her throat before she was able to chew. Patient notes that when the kehinde piece went down her throat she immediately felt like it was stuck. Since then she states that she has been feeling intermittent sensations \"like I have to gulp\". She estimates that these sensations occur every few minutes, but she is still able to eat, drink, and tolerate PO intake. Patient denies history of similar symptoms in the past. She specifically denies abdominal pain, chest pain, nausea, and vomiting. There are no other acute medical concerns at this time. Social hx: Denies Tobacco use; Denies EtOH use; Denies Illicit Drug Abuse    PCP: Jarocho Domingo NP    Note written by Casey Bradford, as dictated by Lorrie Purvis MD 1:51 AM       The history is provided by the patient. No  was used.         Past Medical History:   Diagnosis Date    Anemia     During pregnancy, taking Iron    Kidney stone     Routine Papanicolaou smear 07/20/2016    Negative (no hpv) from [facility]        Past Surgical History:   Procedure Laterality Date    HX CHOLECYSTECTOMY  07/2010    HX OTHER SURGICAL  2011    cholecystectomy    HX WISDOM TEETH EXTRACTION  2009         Family History:   Problem Relation Age of Onset    Thyroid Disease Mother     Cancer Father         Prostate     Thyroid Disease Maternal Grandmother     Thyroid Disease Other         6 Siblings of Mother h/o Hyperthyroidism        Social History     Socioeconomic History    Marital status:      Spouse name: Not on file    Number of children: Not on file    Years of education: Not on file    Highest education level: Not on file   Social Needs    Financial resource strain: Not on file    Food insecurity - worry: Not on file    Food insecurity - inability: Not on file    Transportation needs - medical: Not on file   [employer] needs - non-medical: Not on file   Occupational History    Not on file   Tobacco Use    Smoking status: Never Smoker    Smokeless tobacco: Never Used    Tobacco comment: Never used vapor or e-cigs    Substance and Sexual Activity    Alcohol use: No    Drug use: No    Sexual activity: Yes     Partners: Male     Birth control/protection: None   Other Topics Concern    Not on file   Social History Narrative    Not on file         ALLERGIES: Amoxicillin    Review of Systems   Constitutional: Negative for chills and fever. HENT: Positive for trouble swallowing. Negative for rhinorrhea and sore throat. Respiratory: Negative for cough and shortness of breath. Cardiovascular: Negative for chest pain. Gastrointestinal: Negative for abdominal pain, diarrhea, nausea and vomiting. Genitourinary: Negative for dysuria and urgency. Musculoskeletal: Negative for arthralgias and back pain. Skin: Negative for rash. Neurological: Negative for dizziness, weakness and light-headedness. Vitals:    03/06/19 0130 03/06/19 0151   BP:  103/70   Pulse: 61 (!) 59   Resp:  16   Temp:  97.7 °F (36.5 °C)   SpO2: 99%             Physical Exam     Vital signs reviewed. Nursing notes reviewed.     Const:  No acute distress, well developed, well nourished  Head:  Atraumatic, normocephalic  Eyes:  PERRL, conjunctiva normal, no scleral icterus  Neck:  Supple, trachea midline  Cardiovascular:  RRR, no murmurs, no gallops, no rubs  Resp:  No resp distress, no increased work of breathing, no wheezes, no rhonchi, no rales,  Abd:  Soft, non-tender, non-distended, no rebound, no guarding, no CVA tenderness  :  Deferred  MSK:  No pedal edema, normal ROM  Neuro: Alert and oriented x3, no cranial nerve defect  Skin:  Warm, dry, intact  Psych: normal mood and affect, behavior is normal, judgement and thought content is normal     Note written by Leigh Ann Salazar. Major Polanco, as dictated by Jyothi Haywood MD 1:51 AM     MDM  Number of Diagnoses or Management Options  Esophageal obstruction due to food impaction:      Amount and/or Complexity of Data Reviewed  Review and summarize past medical records: yes    Patient Progress  Patient progress: stable          Pt. Presents to the ER with complaints of food stuck in her esophagus. Sometime en route or in the ED, it seems that the impaction resolved. Pt. Able to eat without difficulty  Pt. Says that she feels much better. Pt. To f/u with her PCP or return to the ER with worsening sx.       Procedures

## 2019-07-13 ENCOUNTER — OFFICE VISIT (OUTPATIENT)
Dept: PRIMARY CARE CLINIC | Age: 34
End: 2019-07-13

## 2019-07-13 VITALS
WEIGHT: 94.4 LBS | HEIGHT: 63 IN | TEMPERATURE: 97.9 F | SYSTOLIC BLOOD PRESSURE: 82 MMHG | BODY MASS INDEX: 16.73 KG/M2 | DIASTOLIC BLOOD PRESSURE: 59 MMHG | HEART RATE: 77 BPM | OXYGEN SATURATION: 98 % | RESPIRATION RATE: 16 BRPM

## 2019-07-13 DIAGNOSIS — N61.0 MASTITIS: Primary | ICD-10-CM

## 2019-07-13 RX ORDER — CEPHALEXIN 500 MG/1
500 CAPSULE ORAL 4 TIMES DAILY
Qty: 28 CAP | Refills: 0 | Status: SHIPPED | OUTPATIENT
Start: 2019-07-13 | End: 2019-07-20

## 2019-07-13 RX ORDER — ACETAMINOPHEN 325 MG/1
TABLET ORAL
COMMUNITY
End: 2020-01-13

## 2019-07-13 NOTE — PROGRESS NOTES
Chief Complaint   Patient presents with    Breast pain     ? mastitis. Pain starting yesterday afternoon. She states that she has tried hot showers, warm compress and nothing is taking one small area down. Noted lastnight had fever and nausea, which has since went away. she is a 29y.o. year old female who presents for evalution. She is breast feeding a 9 month old infant and developed nausea, fever and right breast area of tenderness over the last 24 hours. Reviewed PmHx, RxHx, FmHx, SocHx, AllgHx and updated and dated in the chart. Review of Systems - negative except as listed above in the HPI    Objective:     Vitals:    07/13/19 1227   BP: (!) 82/59   Pulse: 77   Resp: 16   Temp: 97.9 °F (36.6 °C)   TempSrc: Oral   SpO2: 98%   Weight: 94 lb 6.4 oz (42.8 kg)   Height: 5' 3\" (1.6 m)       Current Outpatient Medications   Medication Sig    acetaminophen (TYLENOL) 325 mg tablet Take  by mouth every four (4) hours as needed for Pain.  cephALEXin (KEFLEX) 500 mg capsule Take 1 Cap by mouth four (4) times daily for 7 days.  prenatal multivit-ca-min-fe-fa tab Take  by mouth. No current facility-administered medications for this visit.         Physical Examination: General appearance - alert, well appearing, and in no distress  Mental status - alert, oriented to person, place, and time  Eyes - pupils equal and reactive, extraocular eye movements intact  Ears - bilateral TM's and external ear canals normal  Nose - normal and patent, no erythema, discharge or polyps  Mouth - mucous membranes moist, pharynx normal without lesions  Neck - supple, no significant adenopathy  Lymphatics - no palpable lymphadenopathy, no hepatosplenomegaly  Chest - clear to auscultation, no wheezes, rales or rhonchi, symmetric air entry  Heart - normal rate, regular rhythm, normal S1, S2, no murmurs, rubs, clicks or gallops  Abdomen - soft, nontender, nondistended, no masses or organomegaly  Breasts -  left breast normal without mass, skin or nipple changes or axillary nodes, lactating,right breast with area of erythema and tenderness to upper outer quadrant and down to nipplel      Assessment/ Plan:   Diagnoses and all orders for this visit:    1. Mastitis  -     cephALEXin (KEFLEX) 500 mg capsule; Take 1 Cap by mouth four (4) times daily for 7 days. Follow-up and Dispositions    · Return if symptoms worsen or fail to improve. Warm compresses, showers, ibuprofen  I have discussed the diagnosis with the patient and the intended plan as seen in the above orders. The patient has received an after-visit summary and questions were answered concerning future plans. Pt conveyed understanding of plan.     Medication Side Effects and Warnings were discussed with patient      Carito Franklin NP

## 2019-07-13 NOTE — PATIENT INSTRUCTIONS
RM 4  Chief Complaint   Patient presents with    Breast pain     ? mastitis. Pain starting yesterday afternoon. She states that she has tried hot showers, warm compress and nothing is taking one small area down. Noted lastnight had fever and nausea, which has since went away. Mastitis: Care Instructions  Your Care Instructions  Mastitis is an inflammation of the breast. It occurs most often in women who are breastfeeding, but it can affect any woman. Mastitis can be caused by poor milk flow from the breast. When milk builds up in a breast, it leaks into the nearby breast tissue. Infection can also develop when the nipples become cracked or irritated. The tissue can then become infected with bacteria. Antibiotics can usually cure mastitis. For women who are nursing, continued breastfeeding (or pumping) can help. If mastitis is not treated, a pocket of pus may form in the breast and need to be drained. Follow-up care is a key part of your treatment and safety. Be sure to make and go to all appointments, and call your doctor if you are having problems. It's also a good idea to know your test results and keep a list of the medicines you take. How can you care for yourself at home? · If your doctor prescribed antibiotics, take them as directed. Do not stop taking them just because you feel better. You need to take the full course of antibiotics. · If you are breastfeeding, continue breastfeeding or pumping breast milk. It is important to empty your breasts regularly, every 2 to 3 hours while you are awake. These tips may help:  ? Before breastfeeding, place a warm, wet washcloth over your breast for about 15 minutes. Try this at least 3 times a day. This increases milk flow in the breast. Massaging the affected breast may also increase milk flow. ? Breastfeed on both sides.  Try to start with your healthy breast first. Then, after your milk is flowing, breastfeed from the affected breast until it feels soft. You should empty this breast completely. Then switch back to the healthy breast and breastfeed until your baby has finished. ? Pump or hand-express a small amount of breast milk before breastfeeding if your breasts are too full with milk. This will make your breasts less full and may make it easier for your baby to latch on to your breast.  ? Pump or express milk from the affected breast if it hurts too much to breastfeed. · Take an over-the-counter pain medicine, such as acetaminophen (Tylenol) or ibuprofen (Advil, Motrin) to relieve pain and fever. Read and follow all instructions on the label. · Do not take two or more pain medicines at the same time unless the doctor told you to. Many pain medicines have acetaminophen, which is Tylenol. Too much acetaminophen (Tylenol) can be harmful. · Rest as much as possible. · Drink extra fluids. · If pus is draining from your infected breast, wash the nipple gently and let it air-dry before you put your bra back on. A disposable breast pad placed in the bra cup may absorb the pus. When should you call for help? Call your doctor now or seek immediate medical care if:    · You have worse symptoms of a breast infection, such as:  ? Increased pain, swelling, redness, or warmth around a breast.  ? Red streaks leading from a breast.  ? Pus draining from a breast.  ? A fever.    Watch closely for changes in your health, and be sure to contact your doctor if:    · You do not get better as expected. Where can you learn more? Go to http://marlen-elyl.info/. Enter Y207 in the search box to learn more about \"Mastitis: Care Instructions. \"  Current as of: September 5, 2018  Content Version: 11.9  © 1492-0339 [employer]. Care instructions adapted under license by [employer] (which disclaims liability or warranty for this information).  If you have questions about a medical condition or this instruction, always ask your healthcare professional. [address] any warranty or liability for your use of this information.

## 2019-07-17 ENCOUNTER — OFFICE VISIT (OUTPATIENT)
Dept: FAMILY MEDICINE CLINIC | Age: 34
End: 2019-07-17

## 2019-07-17 VITALS
TEMPERATURE: 96.9 F | HEART RATE: 75 BPM | WEIGHT: 95.5 LBS | OXYGEN SATURATION: 97 % | HEIGHT: 63 IN | SYSTOLIC BLOOD PRESSURE: 87 MMHG | RESPIRATION RATE: 20 BRPM | DIASTOLIC BLOOD PRESSURE: 50 MMHG | BODY MASS INDEX: 16.92 KG/M2

## 2019-07-17 DIAGNOSIS — Z02.1 PHYSICAL EXAM, PRE-EMPLOYMENT: Primary | ICD-10-CM

## 2019-07-17 DIAGNOSIS — Z01.84 IMMUNITY STATUS TESTING: ICD-10-CM

## 2019-07-17 DIAGNOSIS — Z02.1 PHYSICAL EXAM, PRE-EMPLOYMENT: ICD-10-CM

## 2019-07-17 DIAGNOSIS — Z11.1 SCREENING FOR TUBERCULOSIS: ICD-10-CM

## 2019-07-17 DIAGNOSIS — Z23 ENCOUNTER FOR IMMUNIZATION: ICD-10-CM

## 2019-07-17 NOTE — PROGRESS NOTES
Chief Complaint   Patient presents with    Physical     Room 5    Form Completion      for physical         HPI:  The patient is a 29 y.o. female who presents today for a follow up appointment. No hospital, ER or specialist visits since last primary care visit except as noted below. 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  She has her forms with her as well as instructions for other testing that is necessary to complete. Review of Systems  A comprehensive review of systems was negative except for that written in the HPI. Patient Active Problem List   Diagnosis Code    Pregnancy Z34.90    Normal labor J84, V20.1    Umbilical hernia without obstruction and without gangrene K42.9       Past Medical History:   Diagnosis Date    Anemia     During pregnancy, taking Iron    Kidney stone     Routine Papanicolaou smear 07/20/2016    Negative (no hpv) from [facility]        Past Surgical History:   Procedure Laterality Date    HX CHOLECYSTECTOMY  07/2010    HX OTHER SURGICAL  2011    cholecystectomy    HX WISDOM TEETH EXTRACTION  2009       Social History     Tobacco Use    Smoking status: Never Smoker    Smokeless tobacco: Never Used    Tobacco comment: Never used vapor or e-cigs    Substance Use Topics    Alcohol use: No    Drug use: No       Family History   Problem Relation Age of Onset    Thyroid Disease Mother     Cancer Father         Prostate     Thyroid Disease Maternal Grandmother     Thyroid Disease Other         6 Siblings of Mother h/o Hyperthyroidism        Outpatient Medications Marked as Taking for the 7/17/19 encounter (Office Visit) with Shayy Alcala NP   Medication Sig Dispense Refill    acetaminophen (TYLENOL) 325 mg tablet Take  by mouth every four (4) hours as needed for Pain.  cephALEXin (KEFLEX) 500 mg capsule Take 1 Cap by mouth four (4) times daily for 7 days.  28 Cap 0    prenatal multivit-ca-min-fe-fa tab Take  by mouth. Current Outpatient Medications on File Prior to Visit   Medication Sig Dispense Refill    acetaminophen (TYLENOL) 325 mg tablet Take  by mouth every four (4) hours as needed for Pain.  cephALEXin (KEFLEX) 500 mg capsule Take 1 Cap by mouth four (4) times daily for 7 days. 28 Cap 0    prenatal multivit-ca-min-fe-fa tab Take  by mouth. No current facility-administered medications on file prior to visit. Allergies   Allergen Reactions    Amoxicillin Rash       PE:  Visit Vitals  BP (!) 87/50 (BP 1 Location: Right arm, BP Patient Position: Sitting)   Pulse 75   Temp 96.9 °F (36.1 °C) (Oral)   Resp 20   Ht 5' 3\" (1.6 m)   Wt 95 lb 8 oz (43.3 kg)   SpO2 97%   BMI 16.92 kg/m²       Gen: alert, oriented, no acute distress  Head: normocephalic, atraumatic  Ears: external auditory canals clear, TMs without erythema or effusion  Eyes: pupils equal round reactive to light, sclera clear, conjunctiva clear  Oral: moist mucus membranes, no oral lesions, no pharyngeal inflammation or exudate  Neck: symmetric normal sized thyroid, no carotid bruits, no jugular vein distention  Resp: no increase work of breathing, lungs clear to ausculation bilaterally, no wheezing, rales or rhonchi  CV: S1, S2 normal.  No murmurs, rubs, or gallops. Abd: soft, not tender, not distended. No hepatosplenomegaly. Normal bowel sounds. No hernias. No abdominal or renal bruits. Neuro: cranial nerves intact, normal strength and movement in all extremities, reflexes and sensation intact and symmetric. Skin: no lesion or rash  Extremities: no cyanosis or edema    No results found for this visit on 07/17/19. Assessment/Plan:    ICD-10-CM ICD-9-CM    1. Physical exam, pre-employment Z02.1 V70.5 MEASLES/MUMPS/RUBELLA IMMUNITY      VZV AB, IGG      QUANTIFERON-TB GOLD PLUS   2.  Encounter for immunization Z23 V03.89 TETANUS, DIPHTHERIA TOXOIDS AND ACELLULAR PERTUSSIS VACCINE (TDAP), IN INDIVIDS. >=7, IM      MT IMMUNIZ ADMIN,1 SINGLE/COMB VAC/TOXOID   3. Immunity status testing Z01.84 V72.61 MEASLES/MUMPS/RUBELLA IMMUNITY      VZV AB, IGG      QUANTIFERON-TB GOLD PLUS   4. Screening for tuberculosis Z11.1 V74.1 QUANTIFERON-TB GOLD PLUS     Diagnoses and all orders for this visit:    1. Physical exam, pre-employment  -     MEASLES/MUMPS/RUBELLA IMMUNITY; Future  -     VZV AB, IGG; Future  -     QUANTIFERON-TB GOLD PLUS; Future    2. Encounter for immunization  -     TETANUS, DIPHTHERIA TOXOIDS AND ACELLULAR PERTUSSIS VACCINE (TDAP), IN INDIVIDS. >=7, IM  -     MT IMMUNIZ ADMIN,1 SINGLE/COMB VAC/TOXOID    3. Immunity status testing  -     MEASLES/MUMPS/RUBELLA IMMUNITY; Future  -     VZV AB, IGG; Future  -     QUANTIFERON-TB GOLD PLUS; Future    4. Screening for tuberculosis  -     QUANTIFERON-TB GOLD PLUS; Future      Follow-up and Dispositions    · Return if symptoms worsen or fail to improve.       lab results and schedule of future lab studies reviewed with patient  reviewed diet, exercise and weight control  reviewed medications and side effects in detail  Patient's form to be completed once her lab results become available. Patient to  the form once it is ready. call if any problems    Health Maintenance reviewed - deferred to PCP. Recommended healthy diet low in carbohydrates, fats, sodium and cholesterol. Recommended regular cardiovascular exercise 3-6 times per week for 30-60 minutes daily. Chart is reviewed and updated today in the office. Records requested for other providers patient has seen and is currently seeing. Patient was offered a choice/choices in the treatment plan today. Patient expresses understanding of the plan and agrees with recommendations. Verbal and written instructions (see AVS) provided. See patient instructions for more. Patient expresses understanding of diagnosis and treatment plan.

## 2019-07-17 NOTE — PROGRESS NOTES
Willamic Luther  Identified pt with two pt identifiers(name and ). Chief Complaint   Patient presents with    Physical     Room 5       1. Have you been to the ER, urgent care clinic since your last visit?n   Hospitalized since your last visit? n     2. Have you seen or consulted any other health care providers outside of the [address] [place] [address] since your last visit? Include any pap smears or colon screening. n       Advance Care Planning    In the event something were to happen to you and you were unable to speak on your behalf, do you have an Advance Directive/ Living Will in place stating your wishes? NO    If yes, do we have a copy on file NO    If no, would you like information NO    Medication reconciliation up to date and corrected with patient at this time. Today's provider has been notified of reason for visit, vitals and flowsheets obtained on patients. Reviewed record in preparation for visit, huddled with provider and have obtained necessary documentation.       Health Maintenance Due   Topic    DTaP/Tdap/Td series (1 - Tdap)    PAP AKA CERVICAL CYTOLOGY        Wt Readings from Last 3 Encounters:   19 95 lb 8 oz (43.3 kg)   19 94 lb 6.4 oz (42.8 kg)   19 104 lb (47.2 kg)     Temp Readings from Last 3 Encounters:   19 96.9 °F (36.1 °C) (Oral)   19 97.9 °F (36.6 °C) (Oral)   19 97.7 °F (36.5 °C)     BP Readings from Last 3 Encounters:   19 (!) 87/50   19 (!) 82/59   19 103/70     Pulse Readings from Last 3 Encounters:   19 75   19 77   19 (!) 59     Vitals:    19 1116   BP: (!) 87/50   Pulse: 75   Resp: 20   Temp: 96.9 °F (36.1 °C)   TempSrc: Oral   SpO2: 97%   Weight: 95 lb 8 oz (43.3 kg)   Height: 5' 3\" (1.6 m)   PainSc:   0 - No pain         Learning Assessment:  :     Learning Assessment 2019   PRIMARY LEARNER Patient Patient   HIGHEST LEVEL OF EDUCATION - PRIMARY LEARNER  > 4 YEARS OF COLLEGE > 4 YEARS OF COLLEGE   BARRIERS PRIMARY LEARNER NONE -   CO-LEARNER CAREGIVER No -   PRIMARY LANGUAGE ENGLISH ENGLISH   LEARNER PREFERENCE PRIMARY DEMONSTRATION DEMONSTRATION   ANSWERED BY patient patient    RELATIONSHIP SELF SELF       Depression Screening:  :     3 most recent PHQ Screens 7/17/2019   PHQ Not Done -   Little interest or pleasure in doing things Not at all   Feeling down, depressed, irritable, or hopeless Not at all   Total Score PHQ 2 0       No flowsheet data found. Fall Risk Assessment:  :     No flowsheet data found. Abuse Screening:  :     Abuse Screening Questionnaire 12/21/2018   Do you ever feel afraid of your partner? N   Are you in a relationship with someone who physically or mentally threatens you? N   Is it safe for you to go home? Y       ADL Screening:  :     ADL Assessment 7/17/2019   Feeding yourself No Help Needed   Getting from bed to chair No Help Needed   Getting dressed No Help Needed   Bathing or showering No Help Needed   Walk across the room (includes cane/walker) No Help Needed   Using the telphone No Help Needed   Taking your medications No Help Needed   Preparing meals No Help Needed   Managing money (expenses/bills) No Help Needed   Moderately strenuous housework (laundry) No Help Needed   Shopping for personal items (toiletries/medicines) No Help Needed   Shopping for groceries No Help Needed   Driving No Help Needed   Climbing a flight of stairs No Help Needed   Getting to places beyond walking distances No Help Needed           BMI:  Weight Metrics 7/17/2019 7/13/2019 1/3/2019 12/21/2018 10/23/2018 10/19/2018 8/29/2018   Weight 95 lb 8 oz 94 lb 6.4 oz 104 lb 104 lb 8 oz 106 lb 107 lb 125 lb   BMI 16.92 kg/m2 16.72 kg/m2 18.42 kg/m2 18.51 kg/m2 18.78 kg/m2 18.95 kg/m2 22.14 kg/m2           Medication reconciliation up to date and corrected with patient at this time.

## 2019-07-17 NOTE — PATIENT INSTRUCTIONS
Vaccine Information Statement     Tdap (Tetanus, Diphtheria, Pertussis) Vaccine: What You Need to Know    Many Vaccine Information Statements are available in [place]ian and other languages. See www.immunize.org/vis. Hojas de Información Sobre Vacunas están disponibles en español y en muchos otros idiomas. Visite AbelardoScale.si    1. Why get vaccinated? Tetanus, diphtheria, and pertussis are very serious diseases. Tdap vaccine can protect us from these diseases. And, Tdap vaccine given to pregnant women can protect  babies against pertussis. TETANUS (Lockjaw) is rare in the [facility] today. It causes painful muscle tightening and stiffness, usually all over the body.  It can lead to tightening of muscles in the head and neck so you cant open your mouth, swallow, or sometimes even breathe. Tetanus kills about 1 out of 10 people who are infected even after receiving the best medical care. DIPHTHERIA is also rare in the [facility] today. It can cause a thick coating to form in the back of the throat.  It can lead to breathing problems, heart failure, paralysis, and death. PERTUSSIS (Whooping Cough) causes severe coughing spells, which can cause difficulty breathing, vomiting, and disturbed sleep.  It can also lead to weight loss, incontinence, and rib fractures. Up to 2 in 100 adolescents and 5 in 100 adults with pertussis are hospitalized or have complications, which could include pneumonia or death. These diseases are caused by bacteria. Diphtheria and pertussis are spread from person to person through secretions from coughing or sneezing. Tetanus enters the body through cuts, scratches, or wounds. Before vaccines, as many as 200,000 cases of diphtheria, 200,000 cases of pertussis, and hundreds of cases of tetanus, were reported in the [place] each year.  Since vaccination began, reports of cases for tetanus and diphtheria have dropped by about 99% and for pertussis by about 80%. 2. Tdap vaccine    Tdap vaccine can protect adolescents and adults from tetanus, diphtheria, and pertussis. One dose of Tdap is routinely given at age 6 or 15. People who did not get Tdap at that age should get it as soon as possible. Tdap is especially important for health care professionals and anyone having close contact with a baby younger than 12 months. Pregnant women should get a dose of Tdap during every pregnancy, to protect the  from pertussis. Infants are most at risk for severe, life-threatening complications from pertussis. Another vaccine, called Td, protects against tetanus and diphtheria, but not pertussis. A Td booster should be given every 10 years. Tdap may be given as one of these boosters if you have never gotten Tdap before. Tdap may also be given after a severe cut or burn to prevent tetanus infection. Your doctor or the person giving you the vaccine can give you more information. Tdap may safely be given at the same time as other vaccines. 3. Some people should not get this vaccine     A person who has ever had a life-threatening allergic reaction after a previous dose of any diphtheria, tetanus or pertussis containing vaccine, OR has a severe allergy to any part of this vaccine, should not get Tdap vaccine. Tell the person giving the vaccine about any severe allergies.  Anyone who had coma or long repeated seizures within 7 days after a childhood dose of DTP or DTaP, or a previous dose of Tdap, should not get Tdap, unless a cause other than the vaccine was found. They can still get Td.  Talk to your doctor if you:  - have seizures or another nervous system problem,  - had severe pain or swelling after any vaccine containing diphtheria, tetanus or pertussis,   - ever had a condition called Guillain Barré Syndrome (GBS),  - arent feeling well on the day the shot is scheduled.     4. Risks    With any medicine, including vaccines, there is a chance of side effects. These are usually mild and go away on their own. Serious reactions are also possible but are rare. Most people who get Tdap vaccine do not have any problems with it. Mild Problems following Tdap  (Did not interfere with activities)   Pain where the shot was given (about 3 in 4 adolescents or 2 in 3 adults)   Redness or swelling where the shot was given (about 1 person in 5)   Mild fever of at least 100.4°F (up to about 1 in 25 adolescents or 1 in 100 adults)   Headache (about 3 or 4 people in 10)   Tiredness (about 1 person in 3 or 4)   Nausea, vomiting, diarrhea, stomach ache (up to 1 in 4 adolescents or 1 in 10 adults)   Chills,  sore joints (about 1 person in 10)   Body aches (about 1 person in 3 or 4)    Rash, swollen glands (uncommon)    Moderate Problems following Tdap  (Interfered with activities, but did not require medical attention)   Pain where the shot was given (up to 1 in 5 or 6)    Redness or swelling where the shot was given (up to about 1 in 16 adolescents or 1 in 12 adults)   Fever over 102°F (about 1 in 100 adolescents or 1 in 250 adults)   Headache (about 1 in 7 adolescents or 1 in 10 adults)   Nausea, vomiting, diarrhea, stomach ache (up to 1 or 3 people in 100)   Swelling of the entire arm where the shot was given (up to about 1 in 500). Severe Problems following Tdap  (Unable to perform usual activities; required medical attention)   Swelling, severe pain, bleeding, and redness in the arm where the shot was given (rare). Problems that could happen after any vaccine:     People sometimes faint after a medical procedure, including vaccination. Sitting or lying down for about 15 minutes can help prevent fainting, and injuries caused by a fall. Tell your doctor if you feel dizzy, or have vision changes or ringing in the ears.      Some people get severe pain in the shoulder and have difficulty moving the arm where a shot was given. This happens very rarely.  Any medication can cause a severe allergic reaction. Such reactions from a vaccine are very rare, estimated at fewer than 1 in a million doses, and would happen within a few minutes to a few hours after the vaccination. As with any medicine, there is a very remote chance of a vaccine causing a serious injury or death. The safety of vaccines is always being monitored. For more information, visit: www.cdc.gov/vaccinesafety/    5. What if there is a serious problem? What should I look for?  Look for anything that concerns you, such as signs of a severe allergic reaction, very high fever, or unusual behavior.  Signs of a severe allergic reaction can include hives, swelling of the face and throat, difficulty breathing, a fast heartbeat, dizziness, and weakness. These would usually start a few minutes to a few hours after the vaccination. What should I do?  If you think it is a severe allergic reaction or other emergency that cant wait, call 9-1-1 or get the person to the nearest hospital. Otherwise, call your doctor.  Afterward, the reaction should be reported to the Vaccine Adverse Event Reporting System (VAERS). Your doctor might file this report, or you can do it yourself through the VAERS web site at www.vaers. [facility].gov, or by calling 5-850.565.6241. VAERS does not give medical advice. 6. The National Vaccine Injury Compensation Program    The [facility] Vaccine Injury Compensation Program (VICP) is a federal program that was created to compensate people who may have been injured by certain vaccines. Persons who believe they may have been injured by a vaccine can learn about the program and about filing a claim by calling 6-878.814.8043 or visiting the [employer] website at www.[facility].gov/vaccinecompensation. There is a time limit to file a claim for compensation. 7. How can I learn more?  Ask your doctor.  He or she can give you the vaccine package insert or suggest other sources of information.  Call your local or state health department.  Contact the Centers for Disease Control and Prevention (CDC):  - Call 8-566.945.9422 (1-800-CDC-INFO) or  - Visit CDCs website at www.cdc.gov/vaccines      Vaccine Information Statement   Tdap Vaccine  (2/24/2015)  42 AYLIN Araujo [id number]    Department of Health and Human Services  Centers for Disease Control and Prevention    Office Use Only

## 2019-07-20 LAB
MEV IGG SER IA-ACNC: 281 AU/ML
MUV IGG SER IA-ACNC: >300 AU/ML
RUBV IGG SERPL IA-ACNC: 2.31 INDEX
VZV IGG SER IA-ACNC: 3184 INDEX

## 2019-07-22 LAB
GAMMA INTERFERON BACKGROUND BLD IA-ACNC: 0.12 IU/ML
M TB IFN-G BLD-IMP: NEGATIVE
M TB IFN-G CD4+ BCKGRND COR BLD-ACNC: 0.15 IU/ML
MITOGEN IGNF BLD-ACNC: >10 IU/ML
QUANTIFERON INCUBATION, QF1T: NORMAL
QUANTIFERON TB2 AG: 0.16 IU/ML
SERVICE CMNT-IMP: NORMAL

## 2019-08-02 ENCOUNTER — TELEPHONE (OUTPATIENT)
Dept: FAMILY MEDICINE CLINIC | Age: 34
End: 2019-08-02

## 2019-08-02 NOTE — TELEPHONE ENCOUNTER
----- Message from Miranda Davenport sent at 8/2/2019  1:40 PM EDT -----  Regarding: MELISSA Guerra/Telephone  General Message/Vendor Calls    Caller's first and last name:      Reason for call:      Bita Shrestha required yes/no and why: yes      Best contact number(s):[phone]      Details to clarify the request: Patient would like you to fax over that she did a TDapp  vaccine in your office to her job at [address] [place], [place] [address]

## 2019-09-02 NOTE — PROGRESS NOTES
Had N/V, son was sick. +FM, no VB/LOF. TDAP deferred (had high fever with prev vacc). MFM 7/20, BHAVYA same day. - Unreliable LMP, US for dating  - US-  shows viable IUP 7+2 -> CHESTER=8/28/18, nl YS, nl CM.  Does show [facility]  - US (1/25/18) 9+1 -> CHESTER=8/29/18  - EOB ur cx contam -> rpt ur cx 50-100K enterococcus -> Macrobid, PANKAJ (3/19) neg  - MSAFP low risk. Declines aneuploidy.  - US (4/12/18) 20+1 @ 20+2. Fetal anatomy nl (does not want to know gender). Placenta fundal, appears thickened in some images (?artifact) -> MFM  - MFM US (4/17/18) placenta appears thickened in some views (6-7mm), but probably d/t left lat location; o/w appears nl  - MFM US (6/19/18) 30+5 @ 30+0. 1655gm (63%). HANNAH=25.4. Placenta thick in some views (11cm), prob nl variant -> rpt 4wks.   - declines TDAP (had high fever to 103 with prev vacc)
Baron José, [place]  09/02/19 [id number]

## 2020-01-13 ENCOUNTER — OFFICE VISIT (OUTPATIENT)
Dept: URGENT CARE | Age: 35
End: 2020-01-13

## 2020-01-13 VITALS
BODY MASS INDEX: 17.45 KG/M2 | SYSTOLIC BLOOD PRESSURE: 129 MMHG | TEMPERATURE: 97.7 F | WEIGHT: 98.5 LBS | RESPIRATION RATE: 18 BRPM | OXYGEN SATURATION: 97 % | HEIGHT: 63 IN | HEART RATE: 79 BPM | DIASTOLIC BLOOD PRESSURE: 55 MMHG

## 2020-01-13 DIAGNOSIS — J02.9 SORE THROAT: ICD-10-CM

## 2020-01-13 DIAGNOSIS — J06.9 VIRAL UPPER RESPIRATORY TRACT INFECTION: ICD-10-CM

## 2020-01-13 LAB
S PYO AG THROAT QL: NEGATIVE
VALID INTERNAL CONTROL?: YES

## 2020-01-14 NOTE — PROGRESS NOTES
The history is provided by the patient. Cold Symptoms   The history is provided by the patient. This is a new problem. The cough is non-productive. There has been no fever. Associated symptoms include ear pain (ear fullness) and sore throat. Pertinent negatives include no chest pain, no chills, no headaches, no rhinorrhea, no shortness of breath, no wheezing, no nausea and no vomiting. She has tried nothing for the symptoms.         Past Medical History:   Diagnosis Date    Anemia     During pregnancy, taking Iron    Kidney stone     Routine Papanicolaou smear 07/20/2016    Negative (no hpv) from [facility] [address], blistering         Past Surgical History:   Procedure Laterality Date    HX CHOLECYSTECTOMY  07/2010    HX OTHER SURGICAL  2011    cholecystectomy    HX WISDOM TEETH EXTRACTION  2009         Family History   Problem Relation Age of Onset    Thyroid Disease Mother     Cancer Father         Prostate     Thyroid Disease Maternal Grandmother     Thyroid Disease Other         6 Siblings of Mother h/o Hyperthyroidism         Social History     Socioeconomic History    Marital status:      Spouse name: Not on file    Number of children: Not on file    Years of education: Not on file    Highest education level: Not on file   Occupational History    Not on file   Social Needs    Financial resource strain: Not on file    Food insecurity:     Worry: Not on file     Inability: Not on file    Transportation needs:     Medical: Not on file     Non-medical: Not on file   Tobacco Use    Smoking status: Never Smoker    Smokeless tobacco: Never Used    Tobacco comment: Never used vapor or e-cigs    Substance and Sexual Activity    Alcohol use: No    Drug use: No    Sexual activity: Yes     Partners: Male     Birth control/protection: None   Lifestyle    Physical activity:     Days per week: Not on file     Minutes per session: Not on file    Stress: Not on file   Relationships    Social connections:     Talks on phone: Not on file     Gets together: Not on file     Attends [employer] service: Not on file     Active member of club or organization: Not on file     Attends meetings of clubs or organizations: Not on file     Relationship status: Not on file    Intimate partner violence:     Fear of current or ex partner: Not on file     Emotionally abused: Not on file     Physically abused: Not on file     Forced sexual activity: Not on file   Other Topics Concern    Not on file   Social History Narrative    Not on file                ALLERGIES: Amoxicillin    Review of Systems   Constitutional: Negative for chills, fatigue and fever. HENT: Positive for ear pain (ear fullness) and sore throat. Negative for congestion, ear discharge, hearing loss, postnasal drip, rhinorrhea, sinus pressure, sinus pain, trouble swallowing and voice change. Eyes: Negative. Respiratory: Negative for cough, chest tightness, shortness of breath and wheezing. Cardiovascular: Negative for chest pain. Gastrointestinal: Negative for nausea and vomiting. Genitourinary: Negative. Musculoskeletal: Negative. Skin: Negative. Neurological: Negative for dizziness, syncope, weakness, light-headedness and headaches. Vitals:    01/13/20 1843   BP: 129/55   Pulse: 79   Resp: 18   Temp: 97.7 °F (36.5 °C)   SpO2: 97%   Weight: 98 lb 8 oz (44.7 kg)   Height: 5' 3\" (1.6 m)       Physical Exam  Constitutional:       Appearance: Normal appearance. She is well-developed. HENT:      Right Ear: Tympanic membrane normal.      Left Ear: Tympanic membrane normal.      Nose: Congestion present. No rhinorrhea. Mouth/Throat:      Mouth: Mucous membranes are moist.      Pharynx: No oropharyngeal exudate or posterior oropharyngeal erythema. Eyes:      Conjunctiva/sclera: Conjunctivae normal.      Pupils: Pupils are equal, round, and reactive to light. Neck:      Musculoskeletal: Normal range of motion. Cardiovascular:      Rate and Rhythm: Normal rate and regular rhythm. Heart sounds: Normal heart sounds. Pulmonary:      Effort: Pulmonary effort is normal.      Breath sounds: Normal breath sounds. Musculoskeletal: Normal range of motion. Lymphadenopathy:      Cervical: No cervical adenopathy. Skin:     General: Skin is warm and dry. Neurological:      Mental Status: She is alert and oriented to person, place, and time. MDM     Differential Diagnosis; Clinical Impression; Plan:     (J06.9) Viral upper respiratory tract infection  (J02.9) Sore throat  No orders of the defined types were placed in this encounter. Symptoms are viral.  Advised to increase fluids. Drink warm tea and honey. Advised to use a antihistamine. The patients condition was discussed with the patient and they understand. The patient is to follow up with PCP. If signs and symptoms become worse the pt is to go to the ER. The patient is to take medications as prescribed. AVS given with patient instructions upon discharge.                   Procedures

## 2020-01-14 NOTE — PATIENT INSTRUCTIONS
Sore Throat: Care Instructions  Your Care Instructions    Infection by bacteria or a virus causes most sore throats. Cigarette smoke, dry air, air pollution, allergies, and yelling can also cause a sore throat. Sore throats can be painful and annoying. Fortunately, most sore throats go away on their own. If you have a bacterial infection, your doctor may prescribe antibiotics. Follow-up care is a key part of your treatment and safety. Be sure to make and go to all appointments, and call your doctor if you are having problems. It's also a good idea to know your test results and keep a list of the medicines you take. How can you care for yourself at home? · If your doctor prescribed antibiotics, take them as directed. Do not stop taking them just because you feel better. You need to take the full course of antibiotics. · Gargle with warm salt water once an hour to help reduce swelling and relieve discomfort. Use 1 teaspoon of salt mixed in 1 cup of warm water. · Take an over-the-counter pain medicine, such as acetaminophen (Tylenol), ibuprofen (Advil, Motrin), or naproxen (Aleve). Read and follow all instructions on the label. · Be careful when taking over-the-counter cold or flu medicines and Tylenol at the same time. Many of these medicines have acetaminophen, which is Tylenol. Read the labels to make sure that you are not taking more than the recommended dose. Too much acetaminophen (Tylenol) can be harmful. · Drink plenty of fluids. Fluids may help soothe an irritated throat. Hot fluids, such as tea or soup, may help decrease throat pain. · Use over-the-counter throat lozenges to soothe pain. Regular cough drops or hard candy may also help. These should not be given to young children because of the risk of choking. · Do not smoke or allow others to smoke around you. If you need help quitting, talk to your doctor about stop-smoking programs and medicines.  These can increase your chances of quitting for good. · Use a vaporizer or humidifier to add moisture to your bedroom. Follow the directions for cleaning the machine. When should you call for help? Call your doctor now or seek immediate medical care if:    · You have new or worse trouble swallowing.     · Your sore throat gets much worse on one side.    Watch closely for changes in your health, and be sure to contact your doctor if you do not get better as expected. Where can you learn more? Go to http://marlen-elly.info/. Enter [phone] in the search box to learn more about \"Sore Throat: Care Instructions. \"  Current as of: October 21, 2018  Content Version: 12.2  © 1605-2933 [employer]. Care instructions adapted under license by [employer] (which disclaims liability or warranty for this information). If you have questions about a medical condition or this instruction, always ask your healthcare professional. [address] any warranty or liability for your use of this information.

## 2020-01-17 LAB — BACTERIA SPEC RESP CULT: NORMAL

## 2020-03-18 ENCOUNTER — OFFICE VISIT (OUTPATIENT)
Dept: INTERNAL MEDICINE CLINIC | Facility: CLINIC | Age: 35
End: 2020-03-18

## 2020-03-18 VITALS
WEIGHT: 99 LBS | HEART RATE: 69 BPM | SYSTOLIC BLOOD PRESSURE: 100 MMHG | BODY MASS INDEX: 17.54 KG/M2 | TEMPERATURE: 97.7 F | OXYGEN SATURATION: 99 % | HEIGHT: 63 IN | DIASTOLIC BLOOD PRESSURE: 68 MMHG | RESPIRATION RATE: 16 BRPM

## 2020-03-18 DIAGNOSIS — Z76.89 ENCOUNTER TO ESTABLISH CARE: Primary | ICD-10-CM

## 2020-03-18 DIAGNOSIS — Z34.90 INTRAUTERINE PREGNANCY: ICD-10-CM

## 2020-03-18 DIAGNOSIS — Z86.2 HISTORY OF ANEMIA: ICD-10-CM

## 2020-03-18 DIAGNOSIS — R41.3 MEMORY CHANGES: ICD-10-CM

## 2020-03-18 DIAGNOSIS — Z00.00 ROUTINE GENERAL MEDICAL EXAMINATION AT A HEALTH CARE FACILITY: ICD-10-CM

## 2020-03-18 NOTE — PROGRESS NOTES
Jacoby James  Identified pt with two pt identifiers(name and ). Chief Complaint   Patient presents with   [facility].     Room 3B // Currently pregnant // Previous PCP - Racquel Hu     Anemia     History of it and wants to get checked     Thyroid Problem     Wants to get checked        Reviewed record In preparation for visit and have obtained necessary documentation. 1. Have you been to the ER, urgent care clinic or hospitalized since your last visit? No     2. Have you seen or consulted any other health care providers outside of the [address] [place] [address] since your last visit? Include any pap smears or colon screening. No    Patient does not have an advance directive and received information at a previous appointment. Vitals reviewed with provider.     Health Maintenance reviewed:     Health Maintenance Due   Topic    PAP AKA CERVICAL CYTOLOGY           Wt Readings from Last 3 Encounters:   20 99 lb (44.9 kg)   20 98 lb 8 oz (44.7 kg)   19 95 lb 8 oz (43.3 kg)        Temp Readings from Last 3 Encounters:   20 97.7 °F (36.5 °C) (Oral)   20 97.7 °F (36.5 °C)   19 96.9 °F (36.1 °C) (Oral)        BP Readings from Last 3 Encounters:   20 100/68   20 129/55   19 (!) 87/50        Pulse Readings from Last 3 Encounters:   20 69   20 79   19 75        Vitals:    20 0812   BP: 100/68   Pulse: 69   Resp: 16   Temp: 97.7 °F (36.5 °C)   TempSrc: Oral   SpO2: 99%   Weight: 99 lb (44.9 kg)   Height: 5' 3\" (1.6 m)   PainSc:   0 - No pain   LMP: 2020          Learning Assessment:   :       Learning Assessment 2019   PRIMARY LEARNER Patient Patient   HIGHEST LEVEL OF EDUCATION - PRIMARY LEARNER  > 4 YEARS OF COLLEGE > 4 YEARS OF COLLEGE   BARRIERS PRIMARY LEARNER NONE -   CO-LEARNER CAREGIVER No -   PRIMARY LANGUAGE ENGLISH ENGLISH   LEARNER PREFERENCE PRIMARY DEMONSTRATION DEMONSTRATION   ANSWERED BY patient patient    RELATIONSHIP SELF SELF        Depression Screening:   :       3 most recent PHQ Screens 3/18/2020   PHQ Not Done -   Little interest or pleasure in doing things Not at all   Feeling down, depressed, irritable, or hopeless Not at all   Total Score PHQ 2 0        Fall Risk Assessment:   :     No flowsheet data found. Abuse Screening:   :       Abuse Screening Questionnaire 12/21/2018   Do you ever feel afraid of your partner? N   Are you in a relationship with someone who physically or mentally threatens you? N   Is it safe for you to go home?  Y        ADL Screening:   :       ADL Assessment 7/17/2019   Feeding yourself No Help Needed   Getting from bed to chair No Help Needed   Getting dressed No Help Needed   Bathing or showering No Help Needed   Walk across the room (includes cane/walker) No Help Needed   Using the telphone No Help Needed   Taking your medications No Help Needed   Preparing meals No Help Needed   Managing money (expenses/bills) No Help Needed   Moderately strenuous housework (laundry) No Help Needed   Shopping for personal items (toiletries/medicines) No Help Needed   Shopping for groceries No Help Needed   Driving No Help Needed   Climbing a flight of stairs No Help Needed   Getting to places beyond walking distances No Help Needed

## 2020-03-18 NOTE — PROGRESS NOTES
CC:  Chief Complaint   Patient presents with   [address]     Room 3B // Currently pregnant // Previous PCP - Adam Morales     Anemia     History of it and wants to get checked     Thyroid Problem     Wants to get checked        HISTORY OF PRESENT ILLNESS  Livia Rios is a 36 y.o. female    Presents to [facility] care. Her previous PCP was Dr. Adam Morales. Has history of anemia and is currently about 5-6 weeks pregnant. Report when she made the appointment 2 months ago, she was having lightheadedness, fatigue, and memory problems. These symptoms have improved but still having mild memory problems, also mild anxiety and difficulty gaining weight. Denies heat or cold intolerance, tremors, or heart palpitations. Is concerned about her thyroid. No history of thyroid problems during pregnancy. Her maternal grandmother had hypothyroidism, her mother has hyperthyroidism, and her mother's  5 siblings have hyper or hypothyroidism. Soc Hx    . Has 2 children (ages 1.5 and 2.6 yo). 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 Takes classes in [address] [place] [address] at [employer] [facility]. Never smoker. Denies alcohol or recreational drug use. Gets regular exercise. Health Maintenance  Flu vaccine: 10/1/19   Tetanus vaccine:  Tdap 7/17/19     Pap smear: 2018, 7/20/16, negative (Luma Hammer), Dr. Elliott JEFFERS  Constitutional: negative for fevers, chills, night sweats, fatigue, weight loss  Eyes:   negative for visual disturbance and irritation  ENT:   negative for tinnitus, sore throat, nasal congestion, ear pains, hoarseness  Respiratory:  negative for cough, hemoptysis, dyspnea, wheezing  CV:   negative for chest pain, palpitations, lower extremity edema  GI:   negative for heartburn, abd pain, nausea, vomiting, diarrhea, constipation  Endo:               negative for polyuria, polydipsia, polyphagia, cold/heat intolerance  Genitourinary: negative for frequency, dysuria, hematuria  Integument:  negative for rash and pruritus  Hematologic:  negative for easy bruising and gum/nose bleeding  Musculoskel: negative for myalgias, joint pain, back pain, muscle weakness  Neurological:  negative for headaches, dizziness, gait problems  Behavl/Psych: negative for feelings of depression, mood change    Patient Active Problem List   Diagnosis Code    Pregnancy Z34.90    Normal labor D37, G81.5    Umbilical hernia without obstruction and without gangrene K42.9     Past Medical History:   Diagnosis Date    Anemia     During pregnancy, taking Iron    Kidney stone     Routine Papanicolaou smear 07/20/2016    Negative (no hpv) from [address], blistering      Past Surgical History:   Procedure Laterality Date    HX CHOLECYSTECTOMY  07/2010    HX OTHER SURGICAL  2011    cholecystectomy    HX WISDOM TEETH EXTRACTION  2009     Social History     Socioeconomic History    Marital status:      Spouse name: Not on file    Number of children: Not on file    Years of education: Not on file    Highest education level: Not on file   Tobacco Use    Smoking status: Never Smoker    Smokeless tobacco: Never Used    Tobacco comment: Never used vapor or e-cigs    Substance and Sexual Activity    Alcohol use: No    Drug use: No    Sexual activity: Yes     Partners: Male     Birth control/protection: None     Family History   Problem Relation Age of Onset    Thyroid Disease Mother     Cancer Father         Prostate     Thyroid Disease Maternal Grandmother     Stroke Maternal Grandmother     Thyroid Disease Other         6 Siblings of Mother h/o Hyperthyroidism      Allergies   Allergen Reactions    Amoxicillin Rash    Tetanus Vaccines And Toxoid Other (comments)     Fever           PHYSICAL EXAM  Visit Vitals  /68 (BP 1 Location: Left arm, BP Patient Position: Sitting)   Pulse 69   Temp 97.7 °F (36.5 °C) (Oral) Resp 16   Ht 5' 3\" (1.6 m)   Wt 99 lb (44.9 kg)   LMP 02/08/2020   SpO2 99%   BMI 17.54 kg/m²       General: Underweight. In no distress. Alert and oriented to person, place, and time. Head: Normocephalic, atraumatic. Eyes: Conjunctiva clear. Pupils equal, round, reactive to light. Extraocular movements intact. Ears/Nose: Normal nasal mucosa. Mouth/Throat: Oropharynx benign. Neck: Supple, symmetrical, trachea midline, no lymphadenopathy, no carotid bruits, no JVD, thyroid: not enlarged, symmetric, no tenderness/mass/nodules. Lungs: Clear to auscultation bilaterally. No crackles or wheezes. Chest Wall: No tenderness or deformity. Heart: RRR, normal S1 and S2, no murmur, click, rub, or gallop. Back: ROM normal. No CVA tenderness. Abdomen: Soft, non-distended, bowel sounds normal. No tenderness. No masses. No hepatosplenomegaly. Lymph nodes: Cervical and supraclavicular nodes normal.  Extremities: No clubbing, cyanosis, or edema. Skin: No rashes or lesions. Pulses: 2+ and symmetric at dorsalis pedis and posterior tibialis pulses. Neurological: CN II-XII intact. Normal strength, sensation, and reflexes throughout. Musculoskeletal: Gait normal. ROM normal at both knees. Psychiatric: Normal mood and affect. Behavior is normal.          ASSESSMENT AND PLAN    ICD-10-CM ICD-9-CM    1. Encounter to establish care Z76.89 V65.8    2. Memory changes R41.3 780.93    3. Intrauterine pregnancy Z34.90 V22.2    4. History of anemia Z86.2 V12.3    5. Routine general medical examination at a health care facility E90.23 I73.3 METABOLIC PANEL, COMPREHENSIVE      CBC WITH AUTOMATED DIFF      TSH AND FREE T4      LIPID PANEL      HEMOGLOBIN A1C WITH EAG     Diagnoses and all orders for this visit:    1. Encounter to establish care    2. Memory changes  Also has mild anxiety and difficulty gaining weight. Has strong family history of thyroid disease. Rule out hyperthyroidism.     3. Intrauterine pregnancy  Schedule with OB/GYN. 4. History of anemia    5. Routine general medical examination at a health care facility  -     METABOLIC PANEL, COMPREHENSIVE  -     CBC WITH AUTOMATED DIFF  -     TSH AND FREE T4  -     LIPID PANEL  -     HEMOGLOBIN A1C WITH EAG      Follow-up and Dispositions    · Return in about 1 year (around 3/18/2021), or if symptoms worsen or fail to improve, for Annual physical exam.         I have discussed the diagnosis with the patient and the intended plan as seen in the above orders. Patient is in agreement. The patient has received an after-visit summary and questions were answered concerning future plans. I have discussed medication side effects and warnings with the patient as well.

## 2020-03-18 NOTE — PATIENT INSTRUCTIONS

## 2020-04-23 ENCOUNTER — VIRTUAL VISIT (OUTPATIENT)
Dept: OBGYN CLINIC | Age: 35
End: 2020-04-23

## 2020-04-23 VITALS — HEIGHT: 63 IN | BODY MASS INDEX: 17.54 KG/M2

## 2020-04-23 DIAGNOSIS — N92.6 MISSED MENSES: Primary | ICD-10-CM

## 2020-04-23 NOTE — PROGRESS NOTES
[employer] Video visit    Leni Sage is a 36 y.o. female who was seen by synchronous (real-time) audio-video technology on 2020. Objective:     General: alert, cooperative, no distress   Mental  status: mental status: alert, oriented to person, place, and time, normal mood, behavior, speech, dress, motor activity, and thought processes   Resp: resp: normal effort and no respiratory distress   Neuro: neuro: no gross deficits   Skin: skin: no discoloration or lesions of concern on visible areas   Due to this being a TeleHealth evaluation, many elements of the physical examination are unable to be assessed. We discussed the expected course, resolution and complications of the diagnosis(es) in detail. Medication risks, benefits, costs, interactions, and alternatives were discussed as indicated. I advised her to contact the office if her condition worsens, changes or fails to improve as anticipated. She expressed understanding with the diagnosis(es) and plan. Pursuant to the emergency declaration under the [address] [place] [address], [facility] waiver authority and the [employer] and [employer] Act, this Virtual  Visit was conducted, with patient's consent, to reduce the patient's risk of exposure to COVID-19 and provide continuity of care for an established patient. Services were provided through a video synchronous discussion virtually to substitute for in-person clinic visit. Leni Sage is a 36 y.o. female who has is pregnant and would like to have prenatal care with Dr. Beckie Cockayne but has not received any prenatal care to this point. Based on LMP of 20, patient would be 11w and 5 d.    OB History    Para Term  AB Living   3 2 2 0 0 2   SAB TAB Ectopic Molar Multiple Live Births   0 0 0 0 0 2        Primary Provider: Beckie Cockayne; former [de-identified]; moved to [place]    Hx TSVD; 7lb; 2 kids 2.5 and 1.6yo; G2 precip birth [facility]; [employer]ren S[employer]   Jennifer Egan; [email or web address] work     Her relevant past medical history:   Past Medical History:   Diagnosis Date    Anemia     During pregnancy, taking Iron    Kidney stone     Routine Papanicolaou smear 07/20/2016    Negative (no hpv) from [address], blistering         Past Surgical History:   Procedure Laterality Date    HX CHOLECYSTECTOMY  07/2010    HX OTHER SURGICAL  2011    cholecystectomy    HX WISDOM TEETH EXTRACTION  2009     Social History     Occupational History    Not on file   Tobacco Use    Smoking status: Never Smoker    Smokeless tobacco: Never Used    Tobacco comment: Never used vapor or e-cigs    Substance and Sexual Activity    Alcohol use: No    Drug use: No    Sexual activity: Yes     Partners: Male     Birth control/protection: None     Family History   Problem Relation Age of Onset    Thyroid Disease Mother     Cancer Father         Prostate     Thyroid Disease Maternal Grandmother     Stroke Maternal Grandmother     Thyroid Disease Other         6 Siblings of Mother h/o Hyperthyroidism        Allergies   Allergen Reactions    Amoxicillin Rash    Tetanus Vaccines And Toxoid Other (comments)     Fever     Prior to Admission medications    Medication Sig Start Date End Date Taking? Authorizing Provider   PNV FA.73/[id number] fum/folic ac (PRENATAL PO) Take  by mouth.    Yes Provider, Historical        Review of Systems - History obtained from the patient  Constitutional: negative for weight loss, fever, night sweats  HEENT: negative for hearing loss, earache, congestion, snoring, sorethroat  CV: negative for chest pain, palpitations, edema  Resp: negative for cough, shortness of breath, wheezing  Breast: negative for breast lumps, nipple discharge, galactorrhea  GI: negative for change in bowel habits, abdominal pain, black or bloody stools  : negative for frequency, dysuria, hematuria  MSK: negative for back pain, joint pain, muscle pain  Skin: negative for itching, rash, hives  Neuro: negative for dizziness, headache, confusion, weakness  Psych: negative for anxiety, depression, change in mood  Heme/lymph: negative for bleeding, bruising, pallor      Objective:  Visit Vitals  Ht 5' 3\" (1.6 m)   LMP 02/01/2020   BMI 17.54 kg/m²          PHYSICAL EXAMINATION    Constitutional  · Appearance: well-nourished, well developed, alert, in no acute distress    HENT  · Head and Face: appears normal    ·    ·      ·      Skin  · General Inspection: no rash, no lesions identified    Neurologic/Psychiatric  · Mental Status:  · Orientation: grossly oriented to person, place and time  · Mood and Affect: mood normal, affect appropriate    Assessment:    amenorrhea assoc w early pregnancy    Plan:   US/ labs next Tues 3pm  Reviewed hx; welcomed to our office  Patient declines presence of chaperone during today's visit.

## 2020-04-28 ENCOUNTER — OFFICE VISIT (OUTPATIENT)
Dept: OBGYN CLINIC | Age: 35
End: 2020-04-28

## 2020-04-28 VITALS
WEIGHT: 99.5 LBS | SYSTOLIC BLOOD PRESSURE: 102 MMHG | BODY MASS INDEX: 17.63 KG/M2 | HEIGHT: 63 IN | DIASTOLIC BLOOD PRESSURE: 72 MMHG

## 2020-04-28 DIAGNOSIS — Z34.80 SUPERVISION OF OTHER NORMAL PREGNANCY: ICD-10-CM

## 2020-04-28 DIAGNOSIS — Z34.81 PRENATAL CARE, SUBSEQUENT PREGNANCY IN FIRST TRIMESTER: Primary | ICD-10-CM

## 2020-04-28 PROBLEM — Z34.90 PREGNANCY: Status: RESOLVED | Noted: 2018-01-29 | Resolved: 2020-04-28

## 2020-04-28 PROBLEM — Z37.9 NORMAL LABOR: Status: RESOLVED | Noted: 2018-08-29 | Resolved: 2020-04-28

## 2020-04-28 NOTE — PATIENT INSTRUCTIONS
Weeks 10 to 14 of Your Pregnancy: Care Instructions  Your Care Instructions    By weeks 10 to 14 of your pregnancy, the placenta has formed inside your uterus. It is possible to hear your baby's heartbeat with a special ultrasound device. Your baby's eyes can and do move. The arms and legs can bend. This is a good time to think about testing for birth defects. There are two types of tests: screening and diagnostic. Screening tests show the chance that a baby has a certain birth defect. They can't tell you for sure that your baby has a problem. Diagnostic tests show if a baby has a certain birth defect. It's your choice whether to have these tests. You and your partner can talk to your doctor or midwife about birth defects tests. Follow-up care is a key part of your treatment and safety. Be sure to make and go to all appointments, and call your doctor if you are having problems. It's also a good idea to know your test results and keep a list of the medicines you take. How can you care for yourself at home? Decide about tests  · You can have screening tests and diagnostic tests to check for birth defects. The decision to have a test for birth defects is personal. Think about your age, your chance of passing on a family disease, your need to know about any problems, and what you might do after you have the test results. ? Triple or quadruple (quad) blood tests. These screening tests can be done between 15 and 20 weeks of pregnancy. They check the amounts of three or four substances in your blood. The doctor looks at these test results, along with your age and other factors, to find out the chance that your baby may have certain problems. ? Amniocentesis. This diagnostic test is used to look for chromosomal problems in the baby's cells.  It can be done between 15 and 20 weeks of pregnancy, usually around week 16.  ? Nuchal translucency test. This test uses ultrasound to measure the thickness of the area at the back of the baby's neck. An increase in the thickness can be an early sign of Down syndrome. ? Chorionic villus sampling (CVS). This is a test that looks for certain genetic problems with your baby. The same genes that are in your baby are in the placenta. A small piece of the placenta is taken out and tested. This test is done when you are 10 to 13 weeks pregnant. Ease discomfort  · Slow down and take naps when you feel tired. · If your emotions swing, talk to someone. Crying, anxiety, and concentration problems are common. · If your gums bleed, try a softer toothbrush. If your gums are puffy and bleed a lot, see your dentist.  · If you feel dizzy:  ? Get up slowly after sitting or lying down. ? Drink plenty of fluids. ? Eat small snacks to keep your blood sugar stable. ? Put your head between your legs as though you were tying your shoelaces. ? Lie down with your legs higher than your head. Use pillows to prop up your feet. · If you have a headache:  ? Lie down. ? Ask your partner or a good friend for a neck massage. ? Try cool cloths over your forehead or across the back of your neck. ? Use acetaminophen (Tylenol) for pain relief. Do not use nonsteroidal anti-inflammatory drugs (NSAIDs), such as ibuprofen (Advil, Motrin) or naproxen (Aleve), unless your doctor says it is okay. · If you have a nosebleed, pinch your nose gently, and hold it for a short while. To prevent nosebleeds, try massaging a small dab of petroleum jelly, such as Vaseline, in your nostrils. · If your nose is stuffed up, try saline (saltwater) nose sprays. Do not use decongestant sprays. Care for your breasts  · Wear a bra that gives you good support. · Know that changes in your breasts are normal.  ? Your breasts may get larger and more tender. Tenderness usually gets better by 12 weeks. ? Your nipples may get darker and larger, and small bumps around your nipples may show more. ?  The veins in your chest and breasts may show more. · Don't worry about \"toughening'\" your nipples. Breastfeeding will naturally do this. Where can you learn more? Go to http://marlen-elly.info/  Enter R672 in the search box to learn more about \"Weeks 10 to 14 of Your Pregnancy: Care Instructions. \"  Current as of: May 29, 2019Content Version: 12.4  © 4515-7378 Healthwise, Incorporated. Care instructions adapted under license by [employer] (which disclaims liability or warranty for this information). If you have questions about a medical condition or this instruction, always ask your healthcare professional. [address] any warranty or liability for your use of this information.

## 2020-04-28 NOTE — PROGRESS NOTES
Current pregnancy history:    Eduardo Ku is a  36 y.o. female [facility] Patient's last menstrual period was 2020. [employer] She presents for the evaluation of amenorrhea and a positive pregnancy test.    LMP history:  The date of her LMP is 12 certain. Her last menstrual period was normal.  A urine pregnancy test was positive      Based on her LMP, her EDC is 20 and her EGA is 11 weeks,5 days. Her menstrual cycles are regular and occur approximately every 28 days  and range from 3 to 5 days. Ultrasound data:  She had an  ultrasound done by the ultrasound tech today which revealed a viable carrizales pregnancy with a gestational age of 5 weeks and 5 days giving an [address] of 20. Pregnancy symptoms:    Since her LMP she has experienced  urinary frequency, breast tenderness, and nausea. She has been vomiting over the last few weeks. Associated signs and symptoms which she denies: dysuria, discharge, vaginal bleeding. Relevant past pregnancy history:   She has the following pregnancy history: Her last pregnancy was  uncomplicated. She has no history of  delivery. Relevant past medical history:(relevant to this pregnancy): noncontributory. Pap/Occupational history:  Last pap smear: last year Results: Normal            Substance history: negative for alcohol, tobacco and street drugs. Positive for nothing. Exposure history: There is/are no indoor cat/s in the home. The patient was instructed to not change the cat litter. She admits close contact with children on a regular basis. She has had chicken pox or the vaccine in the past.   Patient denies issues with domestic violence. Genetic Screening/Teratology Counseling: (Includes patient, baby's father, or anyone in either family with:)  3.  Patient's age >/= 28 at EDC?--no  2.   Thalassemia ([place], [place], [address], or  background): MCV<80?--no.     3.  Neural tube defect (meningomyelocele, spina bifida, anencephaly)?--no.   4.  Congenital heart defect?--no.  5.  Down syndrome?--no.   6.  Grant-Sachs ([place], [facility])?--no.   7.  Canavan's Disease?--no.   8.  Familial Dysautonomia?--no.   9.  Sickle cell disease or trait ()? --no   The patient has not been tested for sickle trait  10. Hemophilia or other blood disorders?--no. 11.  Muscular dystrophy?--no. 12.  Cystic fibrosis?--no. 13.  Victoria's Chorea?--no. 14.  Mental retardation/autism (if yes was person tested for Fragile X)?--no. 15.  Other inherited genetic or chromosomal disorder?--no. 12.  Maternal metabolic disorder (DM, PKU, etc)?--no. 17.  Patient or FOB with a child with a birth defect not listed above?--no.  17a. Patient or FOB with a birth defect themselves?--no. 18.  Recurrent pregnancy loss, or stillbirth?--no. 19.  Any medications since LMP other than prenatal vitamins (include vitamins, supplements, OTC meds, drugs, alcohol)?--no. 20.  Any other genetic/environmental exposure to discuss?--no. Infection History:  1. Lives with someone with TB or TB exposed?--no.   2.  Patient or partner has history of genital herpes?--no.  3.  Rash or viral illness since LMP?--no.    4.  History of STD (GC, CT, HPV, syphilis, HIV)? --no   5. Other: OTHER?       Past Medical History:   Diagnosis Date    Anemia     During pregnancy, taking Iron    Kidney stone     Routine Papanicolaou smear 07/20/2016    Negative (no hpv) from elma Luke      Past Surgical History:   Procedure Laterality Date    HX CHOLECYSTECTOMY  07/2010    HX OTHER SURGICAL  2011    cholecystectomy    HX WISDOM TEETH EXTRACTION  2009     Social History     Occupational History    Not on file   Tobacco Use    Smoking status: Never Smoker    Smokeless tobacco: Never Used    Tobacco comment: Never used vapor or e-cigs    Substance and Sexual Activity    Alcohol use: No    Drug use: No    Sexual activity: Yes     Partners: Male     Birth control/protection: None     Family History   Problem Relation Age of Onset    Thyroid Disease Mother     Cancer Father         Prostate     Thyroid Disease Maternal Grandmother     Stroke Maternal Grandmother     Thyroid Disease Other         6 Siblings of Mother h/o Hyperthyroidism      OB History    Para Term  AB Living   3 2 2     2   SAB TAB Ectopic Molar Multiple Live Births           0 2      # Outcome Date GA Lbr Efraín/2nd Weight Sex Delivery Anes PTL Lv   3 Current            2 Term 18 40w1d / 00:13 7 lb 6 oz (3.345 kg) M Vag-Spont EPIDURAL AN N TODD      Birth Comments: 3hr labor   1 Term 17 39w5d  6 lb 13.5 oz (3.105 kg) M Vag-Spont EPIDURAL AN N TDOD     Allergies   Allergen Reactions    Amoxicillin Rash    Tetanus Vaccines And Toxoid Other (comments)     Fever     Prior to Admission medications    Medication Sig Start Date End Date Taking? Authorizing Provider   PNV CE.35/ASZQJQO fum/folic ac (PRENATAL PO) Take  by mouth.    Yes Provider, Historical        Review of Systems: History obtained from the patient  Constitutional: negative for weight loss, fever, night sweats  HEENT: negative for hearing loss, earache, congestion, snoring, sore throat  CV: negative for chest pain, palpitations, edema  Resp: negative for cough, shortness of breath, wheezing  Breast: negative for breast lumps, nipple discharge, galactorrhea  GI: negative for change in bowel habits, abdominal pain, black or bloody stools  : negative for frequency, dysuria, hematuria, vaginal discharge  MSK: negative for back pain, joint pain, muscle pain  Skin: negative for itching, rash, hives  Neuro: negative for dizziness, headache, confusion, weakness  Psych: negative for anxiety, depression, change in mood  Heme/lymph: negative for bleeding, bruising, pallor    Objective:  Visit Vitals  /72 (BP 1 Location: Left arm, BP Patient Position: Sitting)   Ht 5' 3\" (1.6 m)   Wt 99 lb 8 oz (45.1 kg)   LMP 02/08/2020   BMI 17.63 kg/m²       Physical Exam:     Constitutional  · Appearance: well-nourished, well developed, alert, in no acute distress    HENT  · Head  · Face: appears normal  · Eyes: appear normal  · Ears: normal  · Mouth: normal  · Lips: no lesions      Chest  · Respiratory Effort: breathing unlabored     Cardiovascular  · Heart:  · Auscultation: regular rate and rhythm without murmur      Gastrointestinal  · Abdominal Examination: abdomen non-tender to palpation, normal bowel sounds, no masses present  · Liver and spleen: no hepatomegaly present, spleen not palpable  · Hernias: no hernias identified    Genitourinary  · deferred    Skin  · General Inspection: no rash, no lesions identified    Neurologic/Psychiatric  · Mental Status:  · Orientation: grossly oriented to person, place and time  · Mood and Affect: mood normal, affect appropriate    Assessment:   Intrauterine pregnancy with issues addressed in problem list  Plan:   Patient declines presence of chaperone during today's visit. Offered CF testing, CVS, Nuchal Translucency, MSAFP, amnio, and discussed NIPT  Course of pregnancy discussed including visit schedule, routine U/S, glucola testing, etc.  Avoid alcoholic beverages and illicit/recreational drugs use  Take prenatal vitamins or folic acid daily. Hospital and practice style discussed with coverage system. Discussed nutrition, toxoplasmosis precautions, sexual activity, exercise, need for influenza vaccine, environmental and work hazards, travel advice, screen for domestic violence, need for seat belts. Discussed seafood, unpasteurized dairy products, deli meat, artificial sweeteners, and caffeine. Discussed current prescription drug use. Given medication list.  Discussed the use of over the counter medications and chemicals. Route of delivery discussed, including risks, benefits     Handouts given to pt.

## 2020-06-16 ENCOUNTER — ROUTINE PRENATAL (OUTPATIENT)
Dept: OBGYN CLINIC | Age: 35
End: 2020-06-16

## 2020-06-16 VITALS
SYSTOLIC BLOOD PRESSURE: 86 MMHG | DIASTOLIC BLOOD PRESSURE: 64 MMHG | BODY MASS INDEX: 18.78 KG/M2 | HEIGHT: 63 IN | WEIGHT: 106 LBS

## 2020-06-16 DIAGNOSIS — Z34.82 PRENATAL CARE, SUBSEQUENT PREGNANCY IN SECOND TRIMESTER: Primary | ICD-10-CM

## 2020-06-16 NOTE — PROGRESS NOTES
IOB labs today; doing great      FETAL SURVEY  A SINGLE VIABLE IUP AT 19W3D IS SEEN. FETAL CARDIAC MOTION OBSERVED. FETAL ANATOMY WAS WELL VISUALIZED AND APPEARS WNL. NO ABNORMALITIES WERE SEEN ON TODAYS EXAM.  APPROPRIATE GROWTH MEASURED. SIZE = DATES. HANNAH, PLACENTA AND CERVIX APPEAR WITHIN NORMAL LIMITS.   GENDER: WNL

## 2020-06-16 NOTE — PATIENT INSTRUCTIONS

## 2020-06-17 LAB
ANTIBODY SCREEN, EXTERNAL: NEGATIVE
CHLAMYDIA, EXTERNAL: NEGATIVE
HBSAG, EXTERNAL: NEGATIVE
HCT, EXTERNAL: 32
HGB EVAL, EXTERNAL: NORMAL
HGB, EXTERNAL: 11
HIV, EXTERNAL: NONREACTIVE
N. GONORRHEA, EXTERNAL: NEGATIVE
PLATELET CNT,   EXTERNAL: 229
RUBELLA, EXTERNAL: NORMAL
T. PALLIDUM, EXTERNAL: NEGATIVE
TYPE, ABO & RH, EXTERNAL: NORMAL
URINALYSIS, EXTERNAL: NEGATIVE
VARICELLA, EXTERNAL: NORMAL

## 2020-06-18 LAB
ABO GROUP BLD: NORMAL
BASOPHILS # BLD AUTO: 0 X10E3/UL (ref 0–0.2)
BASOPHILS NFR BLD AUTO: 0 %
BLD GP AB SCN SERPL QL: NEGATIVE
EOSINOPHIL # BLD AUTO: 0.1 X10E3/UL (ref 0–0.4)
EOSINOPHIL NFR BLD AUTO: 1 %
ERYTHROCYTE [DISTWIDTH] IN BLOOD BY AUTOMATED COUNT: 13.8 % (ref 11.7–15.4)
HBV SURFACE AG SERPL QL IA: NEGATIVE
HCT VFR BLD AUTO: 32 % (ref 34–46.6)
HGB A MFR BLD: 97.7 % (ref 96.4–98.8)
HGB A2 MFR BLD COLUMN CHROM: 2.3 % (ref 1.8–3.2)
HGB BLD-MCNC: 11 G/DL (ref 11.1–15.9)
HGB C MFR BLD: 0 %
HGB F MFR BLD: 0 % (ref 0–2)
HGB FRACT BLD-IMP: NORMAL
HGB OTHER MFR BLD HPLC: 0 %
HGB S BLD QL SOLY: NEGATIVE
HGB S MFR BLD: 0 %
HIV 1+2 AB+HIV1 P24 AG SERPL QL IA: NON REACTIVE
IMM GRANULOCYTES # BLD AUTO: 0 X10E3/UL (ref 0–0.1)
IMM GRANULOCYTES NFR BLD AUTO: 0 %
LYMPHOCYTES # BLD AUTO: 1.7 X10E3/UL (ref 0.7–3.1)
LYMPHOCYTES NFR BLD AUTO: 29 %
MCH RBC QN AUTO: 29.8 PG (ref 26.6–33)
MCHC RBC AUTO-ENTMCNC: 34.4 G/DL (ref 31.5–35.7)
MCV RBC AUTO: 87 FL (ref 79–97)
MONOCYTES # BLD AUTO: 0.4 X10E3/UL (ref 0.1–0.9)
MONOCYTES NFR BLD AUTO: 6 %
NEUTROPHILS # BLD AUTO: 3.8 X10E3/UL (ref 1.4–7)
NEUTROPHILS NFR BLD AUTO: 64 %
PLATELET # BLD AUTO: 229 X10E3/UL (ref 150–450)
RBC # BLD AUTO: 3.69 X10E6/UL (ref 3.77–5.28)
RH BLD: POSITIVE
RUBV IGG SERPL IA-ACNC: 2.79 INDEX
TREPONEMA PALLIDUM IGG+IGM AB [PRESENCE] IN SERUM OR PLASMA BY IMMUNOASSAY: NON REACTIVE
VZV IGG SER IA-ACNC: 2561 INDEX
WBC # BLD AUTO: 6 X10E3/UL (ref 3.4–10.8)

## 2020-06-19 LAB — BACTERIA UR CULT: NO GROWTH

## 2020-06-19 NOTE — PROGRESS NOTES
Overall, labs look great but you are slightly anemic so start an over the counter iron supplement such as SloFe (the generic is fine) or add iron rich foods to your diet

## 2020-06-22 LAB
C TRACH RRNA SPEC QL NAA+PROBE: NEGATIVE
N GONORRHOEA RRNA SPEC QL NAA+PROBE: NEGATIVE

## 2020-08-14 ENCOUNTER — ROUTINE PRENATAL (OUTPATIENT)
Dept: OBGYN CLINIC | Age: 35
End: 2020-08-14
Payer: COMMERCIAL

## 2020-08-14 VITALS
HEIGHT: 63 IN | WEIGHT: 112.5 LBS | SYSTOLIC BLOOD PRESSURE: 86 MMHG | DIASTOLIC BLOOD PRESSURE: 64 MMHG | BODY MASS INDEX: 19.93 KG/M2

## 2020-08-14 DIAGNOSIS — Z34.83 PRENATAL CARE, SUBSEQUENT PREGNANCY IN THIRD TRIMESTER: Primary | ICD-10-CM

## 2020-08-14 DIAGNOSIS — Z34.80 SUPERVISION OF OTHER NORMAL PREGNANCY: ICD-10-CM

## 2020-08-14 LAB
ANTIBODY SCREEN, EXTERNAL: NEGATIVE
GTT, 1 HR, GLUCOLA, EXTERNAL: 109
HCT, EXTERNAL: 30.8
HGB, EXTERNAL: 10.5
PLATELET CNT,   EXTERNAL: 237
T. PALLIDUM, EXTERNAL: NORMAL

## 2020-08-14 PROCEDURE — 0502F SUBSEQUENT PRENATAL CARE: CPT | Performed by: OBSTETRICS & GYNECOLOGY

## 2020-08-14 NOTE — PATIENT INSTRUCTIONS

## 2020-08-18 LAB
BASOPHILS # BLD AUTO: 0 X10E3/UL (ref 0–0.2)
BASOPHILS NFR BLD AUTO: 0 %
BLD GP AB SCN SERPL QL: NEGATIVE
EOSINOPHIL # BLD AUTO: 0.1 X10E3/UL (ref 0–0.4)
EOSINOPHIL NFR BLD AUTO: 1 %
ERYTHROCYTE [DISTWIDTH] IN BLOOD BY AUTOMATED COUNT: 12.5 % (ref 11.7–15.4)
GLUCOSE 1H P 50 G GLC PO SERPL-MCNC: 109 MG/DL (ref 65–139)
HCT VFR BLD AUTO: 30.8 % (ref 34–46.6)
HGB BLD-MCNC: 10.5 G/DL (ref 11.1–15.9)
IMM GRANULOCYTES # BLD AUTO: 0 X10E3/UL (ref 0–0.1)
IMM GRANULOCYTES NFR BLD AUTO: 1 %
LYMPHOCYTES # BLD AUTO: 1.7 X10E3/UL (ref 0.7–3.1)
LYMPHOCYTES NFR BLD AUTO: 28 %
MCH RBC QN AUTO: 31.2 PG (ref 26.6–33)
MCHC RBC AUTO-ENTMCNC: 34.1 G/DL (ref 31.5–35.7)
MCV RBC AUTO: 91 FL (ref 79–97)
MONOCYTES # BLD AUTO: 0.3 X10E3/UL (ref 0.1–0.9)
MONOCYTES NFR BLD AUTO: 6 %
NEUTROPHILS # BLD AUTO: 3.7 X10E3/UL (ref 1.4–7)
NEUTROPHILS NFR BLD AUTO: 64 %
PLATELET # BLD AUTO: 237 X10E3/UL (ref 150–450)
RBC # BLD AUTO: 3.37 X10E6/UL (ref 3.77–5.28)
TREPONEMA PALLIDUM IGG+IGM AB [PRESENCE] IN SERUM OR PLASMA BY IMMUNOASSAY: NON REACTIVE
WBC # BLD AUTO: 5.8 X10E3/UL (ref 3.4–10.8)

## 2020-09-09 ENCOUNTER — ROUTINE PRENATAL (OUTPATIENT)
Dept: OBGYN CLINIC | Age: 35
End: 2020-09-09
Payer: COMMERCIAL

## 2020-09-09 VITALS — BODY MASS INDEX: 21.19 KG/M2 | WEIGHT: 119.6 LBS | DIASTOLIC BLOOD PRESSURE: 70 MMHG | SYSTOLIC BLOOD PRESSURE: 110 MMHG

## 2020-09-09 DIAGNOSIS — Z34.80 SUPERVISION OF OTHER NORMAL PREGNANCY: ICD-10-CM

## 2020-09-09 DIAGNOSIS — Z23 ENCOUNTER FOR IMMUNIZATION: Primary | ICD-10-CM

## 2020-09-09 PROCEDURE — 0502F SUBSEQUENT PRENATAL CARE: CPT | Performed by: OBSTETRICS & GYNECOLOGY

## 2020-09-09 PROCEDURE — 90471 IMMUNIZATION ADMIN: CPT | Performed by: OBSTETRICS & GYNECOLOGY

## 2020-09-09 PROCEDURE — 90686 IIV4 VACC NO PRSV 0.5 ML IM: CPT

## 2020-09-09 NOTE — PROGRESS NOTES
After obtaining consent, and per orders of Dr Maggy Siddiqui, injection of Flulaval given in left deltoid by Patric Hollis LPN. Patient instructed to remain in clinic for 20 minutes afterwards, and to report any adverse reaction to me immediately. Lot: [id number] Exp: 6/30/21 [address]: 65500-539-10.

## 2020-09-09 NOTE — PATIENT INSTRUCTIONS

## 2020-09-09 NOTE — LETTER
9/9/2020 2:23 PM    Ms. [address] [facility] [address]      Ms Mercedes Salazar received her flu shot in our office today.     Flulaval Quadrivalent  [address]# [id number]  Exp: 6/30/21  [employer] Byrd-Saavedra        Sincerely,      Manas Sanchez MD

## 2020-09-22 ENCOUNTER — ROUTINE PRENATAL (OUTPATIENT)
Dept: OBGYN CLINIC | Age: 35
End: 2020-09-22
Payer: COMMERCIAL

## 2020-09-22 VITALS
WEIGHT: 120.5 LBS | HEIGHT: 63 IN | SYSTOLIC BLOOD PRESSURE: 114 MMHG | DIASTOLIC BLOOD PRESSURE: 72 MMHG | BODY MASS INDEX: 21.35 KG/M2

## 2020-09-22 DIAGNOSIS — Z34.83 PRENATAL CARE, SUBSEQUENT PREGNANCY IN THIRD TRIMESTER: Primary | ICD-10-CM

## 2020-09-22 PROCEDURE — 0502F SUBSEQUENT PRENATAL CARE: CPT | Performed by: OBSTETRICS & GYNECOLOGY

## 2020-09-22 NOTE — PATIENT INSTRUCTIONS
Weeks 32 to 34 of Your Pregnancy: Care Instructions  Your Care Instructions     During the last few weeks of your pregnancy, you may have more aches and pains. It's important to rest when you can. Your growing baby is putting more pressure on your bladder. So you may need to urinate more often. Hemorrhoids are also common. These are painful, itchy veins in the rectal area. In the 36th week, most women have a test for group B streptococcus (GBS). GBS is a common bacteria that can live in the vagina and rectum. It can make your baby sick after birth. If you test positive, you will get antibiotics during labor. These will keep your baby from getting the bacteria. You may want to talk with your doctor about banking your baby's umbilical cord blood. This is the blood left in the cord after birth. If you want to save this blood, you must arrange it ahead of time. You can't decide at the last minute. If you haven't already had the Tdap shot during this pregnancy, talk to your doctor about getting it. It will help protect your  against pertussis infection. Follow-up care is a key part of your treatment and safety. Be sure to make and go to all appointments, and call your doctor if you are having problems. It's also a good idea to know your test results and keep a list of the medicines you take. How can you care for yourself at home? Ease hemorrhoids  · Get more liquids, fruits, vegetables, and fiber in your diet. This will help keep your stools soft. · Avoid sitting for too long. Lie on your left side several times a day. · Clean yourself with soft, moist toilet paper. Or you can use witch hazel pads or personal hygiene pads. · If you are uncomfortable, try ice packs. Or you can sit in a warm sitz bath. Do these for 20 minutes at a time, as needed. · Use hydrocortisone cream for pain and itching. Two examples are Anusol and Preparation H Hydrocortisone.   · Ask your doctor about taking an over-the-counter stool softener. Consider breastfeeding  · Experts recommend that women breastfeed for 1 year or longer. · Breast milk may help protect your child from some health problems.  babies are less likely than formula-fed babies to:  ? Get ear infections, colds, diarrhea, and pneumonia. ? Be obese or get diabetes later in life. · Women who breastfeed have less bleeding after the birth. Their uteruses also shrink back faster. · Some women who breastfeed lose weight faster. Making milk burns calories. · Breastfeeding can lower your risk of breast cancer, ovarian cancer, and osteoporosis. Decide about circumcision for boys  · As you make this decision, it may help to think about your personal, Tenriism, and family traditions. You get to decide if you will keep your son's penis natural or if he will be circumcised. · If you decide that you would like to have your baby circumcised, talk with your doctor. You can share your concerns about pain. And you can discuss your preferences for anesthesia. Where can you learn more? Go to http://marlen-elly.info/  Enter X711 in the search box to learn more about \"Weeks 32 to 34 of Your Pregnancy: Care Instructions. \"  Current as of: February 11, 2020               Content Version: 12.6  © 9868-5481 [employer]. Care instructions adapted under license by [email or web address] (which disclaims liability or warranty for this information). If you have questions about a medical condition or this instruction, always ask your healthcare professional. [place] [address] any warranty or liability for your use of this information.

## 2020-10-14 ENCOUNTER — ROUTINE PRENATAL (OUTPATIENT)
Dept: OBGYN CLINIC | Age: 35
End: 2020-10-14

## 2020-10-14 ENCOUNTER — ROUTINE PRENATAL (OUTPATIENT)
Dept: OBGYN CLINIC | Age: 35
End: 2020-10-14
Payer: COMMERCIAL

## 2020-10-14 VITALS
WEIGHT: 124.25 LBS | SYSTOLIC BLOOD PRESSURE: 106 MMHG | BODY MASS INDEX: 22.02 KG/M2 | DIASTOLIC BLOOD PRESSURE: 65 MMHG | HEIGHT: 63 IN

## 2020-10-14 DIAGNOSIS — Z34.80 SUPERVISION OF OTHER NORMAL PREGNANCY: Primary | ICD-10-CM

## 2020-10-14 LAB — GRBS, EXTERNAL: POSITIVE

## 2020-10-14 PROCEDURE — 0502F SUBSEQUENT PRENATAL CARE: CPT | Performed by: OBSTETRICS & GYNECOLOGY

## 2020-10-14 NOTE — PATIENT INSTRUCTIONS

## 2020-10-21 ENCOUNTER — TELEPHONE (OUTPATIENT)
Dept: OBGYN CLINIC | Age: 35
End: 2020-10-21

## 2020-10-21 ENCOUNTER — ROUTINE PRENATAL (OUTPATIENT)
Dept: OBGYN CLINIC | Age: 35
End: 2020-10-21
Payer: COMMERCIAL

## 2020-10-21 VITALS — DIASTOLIC BLOOD PRESSURE: 62 MMHG | SYSTOLIC BLOOD PRESSURE: 104 MMHG | WEIGHT: 126 LBS | BODY MASS INDEX: 22.32 KG/M2

## 2020-10-21 DIAGNOSIS — Z34.83 PRENATAL CARE, SUBSEQUENT PREGNANCY IN THIRD TRIMESTER: Primary | ICD-10-CM

## 2020-10-21 PROCEDURE — 0502F SUBSEQUENT PRENATAL CARE: CPT | Performed by: OBSTETRICS & GYNECOLOGY

## 2020-10-21 NOTE — TELEPHONE ENCOUNTER
Call received at 11:35am      30year old patient  37w4d pregnant. Patient last seen in the office on 10/14/2020 and has appointment today at 2:30pm    Patient calling to say that her sitters child was given the measles vaccine to day and she is wondering if ok for her being pregnant to be exposed to that child    Patient states the child given the vaccine is not showing symptoms. This nurse advised patient per Dr. Zeinab Torres that the vaccine is not live and she should be ok since she ( patient ) is not getting the vaccine    Patient verbalized understanding.

## 2020-10-21 NOTE — PATIENT INSTRUCTIONS

## 2020-10-22 LAB
GP B STREP DNA SPEC QL NAA+PROBE: POSITIVE
ORGANISM IDENTIFICATION, 188761: NORMAL

## 2020-10-23 NOTE — PROGRESS NOTES
Your group B strep results returned positive which is not at all uncommon or evidence of an infection on you.   However, we'll protect the baby by giving you an antibiotic while in labor

## 2020-10-26 ENCOUNTER — TELEPHONE (OUTPATIENT)
Dept: OBGYN CLINIC | Age: 35
End: 2020-10-26

## 2020-10-26 DIAGNOSIS — Z34.80 SUPERVISION OF OTHER NORMAL PREGNANCY: ICD-10-CM

## 2020-10-26 NOTE — TELEPHONE ENCOUNTER
Please share w pt that GBS is not considered an infection to the mom; antibiotics only nec with ROM and/or labor

## 2020-10-26 NOTE — TELEPHONE ENCOUNTER
Patient advised of MD recommendations and verbalized understanding.     Patient will discuss with Md at upcoming appointment

## 2020-10-26 NOTE — TELEPHONE ENCOUNTER
This nurse attempted again to reach the patient and was not able to leave a voice mail message due to voice mail box is full

## 2020-10-26 NOTE — TELEPHONE ENCOUNTER
This nurse attempted to reach the patient and was not able to leave a message due to voice mail box is full.

## 2020-10-26 NOTE — TELEPHONE ENCOUNTER
Call received at 2:05PM      30year old patienti  28w2 d pregnant patient last seen in the office on 10/21/2020    Patient advised of Md reviewed lab results and is wondering about needing to be treated prior to labor    Please advise         Carlo Hale MD    10:40 AM       Your group B strep results returned positive which is not at all uncommon or evidence of an infection on you. Mira Roy, we'll protect the baby by giving you an antibiotic while in labor            Patient Result Comments     Viewed by Carolynn Armstrong on 10/26/2020  9:26 AM   Written by Carlo Hale MD on  10:40 AM   Your group B strep results returned positive which is not at all uncommon or evidence of an infection on you. Mira Courtneyo, we'll protect the baby by giving you an antibiotic while in labor

## 2020-10-27 NOTE — TELEPHONE ENCOUNTER
This nurse attempted to reach the patient and was not able to leave a voice mail message since mail box is full

## 2020-10-27 NOTE — TELEPHONE ENCOUNTER
Please let Erica Kessler know we'll review concerns at her next visit; one dose needs to be completed 2 hours prior to delivery for peds to consider appropriate antibiotic coverage but if labor fast, risk to  extremely minimal

## 2020-10-27 NOTE — TELEPHONE ENCOUNTER
Conversation: Test Results Question   (Oldest Message First)   Rhona Wright   to Juan Jscooby Reeves MD            10/27/20 10:59 AM   Wang Díaz,      Given the positive test results for my group B strep test and the rapid nature of my last delivery, I was wondering if it would be beneficial/possible for the baby to receive the antibiotic early? If this labor is very quick, would the antibiotic's effect miss a time-sensitive window (e.g. if the child is already in the birth canal by the time I receive the antibiotic)? Also, would the antibiotic be as effective if taken a few days or week early?  Thanks!      Frances   patient calling again at 3:34PM  Patient is concerned about a quick deliver and wanting to take a medication prior to delivery    Please advise

## 2020-10-28 ENCOUNTER — ROUTINE PRENATAL (OUTPATIENT)
Dept: OBGYN CLINIC | Age: 35
End: 2020-10-28
Payer: COMMERCIAL

## 2020-10-28 VITALS
WEIGHT: 126.13 LBS | DIASTOLIC BLOOD PRESSURE: 64 MMHG | HEIGHT: 63 IN | SYSTOLIC BLOOD PRESSURE: 111 MMHG | BODY MASS INDEX: 22.35 KG/M2

## 2020-10-28 DIAGNOSIS — Z34.83 PRENATAL CARE, SUBSEQUENT PREGNANCY IN THIRD TRIMESTER: Primary | ICD-10-CM

## 2020-10-28 PROCEDURE — 0502F SUBSEQUENT PRENATAL CARE: CPT | Performed by: OBSTETRICS & GYNECOLOGY

## 2020-10-28 NOTE — PATIENT INSTRUCTIONS
Week 39 of Your Pregnancy: Care Instructions  Your Care Instructions     During these final weeks, you may feel anxious to see your new baby. [place] babies often look different from what you see in pictures or movies. Right after birth, their heads may have a strange shape. Their eyes may be puffy. And their genitals may be swollen. They may also have very dry skin, or red marks on the eyelids, nose, or neck. Still, most parents think their babies are beautiful. Follow-up care is a key part of your treatment and safety. Be sure to make and go to all appointments, and call your doctor if you are having problems. It's also a good idea to know your test results and keep a list of the medicines you take. How can you care for yourself at home? Prepare to breastfeed  · If you are breastfeeding, continue to eat healthy foods. · If your health care provider recommends it, keep taking your prenatal vitamins. · Talk to your doctor before you take any medicine or supplement. That's because some medicines can affect your breast milk. · You can help prevent sore nipples if you feed your baby in the correct position. Nurses will help you learn to do this. Choose the right birth control after your baby is born  · Women who are breastfeeding can still get pregnant. Use birth control if you don't want to get pregnant. · Intrauterine devices (IUDs) are very effective at preventing pregnancy and can provide birth control for 3 to 10 years, depending on the type. If you talk with your doctor before having your baby, the IUD can be placed right after you give birth. If you decide you want to get pregnant later, you can have it removed. They are safe to use while you are breastfeeding. · A hormonal implant is also very effective at preventing pregnancy. It is placed under the skin of the arm. This can be done right after you give birth. It releases the hormone progestin and prevents pregnancy for about 3 years.  This can also be removed by a doctor at any time. It is safe to use while you are breastfeeding. · Depo-Provera can be used while you are breastfeeding. It is a shot you get every 3 months. · Birth control pills work well. But you need a different kind of pill for the first few weeks after giving birth. And when you start taking these pills, you need to make sure to use another type of birth control for 7 days after you start your pack. · Diaphragms, cervical caps, and condoms with spermicide work less well after birth. If you have a diaphragm or cervical cap, talk to your doctor to see if you need a different size. · Tubal ligation (tying your tubes) and vasectomy are both permanent. These are good options if you are sure you are done having children. Where can you learn more? Go to http://www.gray.com/  Enter A811 in the search box to learn more about \"Week 39 of Your Pregnancy: Care Instructions. \"  Current as of: February 11, 2020               Content Version: 12.6  © 5709-8483 [employer]. Care instructions adapted under license by [employer] (which disclaims liability or warranty for this information). If you have questions about a medical condition or this instruction, always ask your healthcare professional. [address] any warranty or liability for your use of this information.

## 2020-11-02 ENCOUNTER — HOSPITAL ENCOUNTER (OUTPATIENT)
Dept: PREADMISSION TESTING | Age: 35
Discharge: HOME | End: 2020-11-02
Payer: COMMERCIAL

## 2020-11-02 ENCOUNTER — TRANSCRIBE ORDER (OUTPATIENT)
Dept: REGISTRATION | Age: 35
End: 2020-11-02

## 2020-11-02 DIAGNOSIS — Z01.812 PRE-PROCEDURE LAB EXAM: Primary | ICD-10-CM

## 2020-11-02 DIAGNOSIS — Z01.812 PRE-PROCEDURE LAB EXAM: ICD-10-CM

## 2020-11-02 PROCEDURE — 87635 SARS-COV-2 COVID-19 AMP PRB: CPT

## 2020-11-03 LAB — SARS-COV-2, COV2NT: NOT DETECTED

## 2020-11-04 ENCOUNTER — ROUTINE PRENATAL (OUTPATIENT)
Dept: OBGYN CLINIC | Age: 35
End: 2020-11-04
Payer: COMMERCIAL

## 2020-11-04 VITALS — WEIGHT: 127 LBS | SYSTOLIC BLOOD PRESSURE: 106 MMHG | DIASTOLIC BLOOD PRESSURE: 68 MMHG | BODY MASS INDEX: 22.5 KG/M2

## 2020-11-04 DIAGNOSIS — Z34.80 SUPERVISION OF OTHER NORMAL PREGNANCY: Primary | ICD-10-CM

## 2020-11-04 PROCEDURE — 0502F SUBSEQUENT PRENATAL CARE: CPT | Performed by: OBSTETRICS & GYNECOLOGY

## 2020-11-04 NOTE — PATIENT INSTRUCTIONS
Week 39 of Your Pregnancy: Care Instructions  Your Care Instructions     During these final weeks, you may feel anxious to see your new baby. [place] babies often look different from what you see in pictures or movies. Right after birth, their heads may have a strange shape. Their eyes may be puffy. And their genitals may be swollen. They may also have very dry skin, or red marks on the eyelids, nose, or neck. Still, most parents think their babies are beautiful. Follow-up care is a key part of your treatment and safety. Be sure to make and go to all appointments, and call your doctor if you are having problems. It's also a good idea to know your test results and keep a list of the medicines you take. How can you care for yourself at home? Prepare to breastfeed  · If you are breastfeeding, continue to eat healthy foods. · If your health care provider recommends it, keep taking your prenatal vitamins. · Talk to your doctor before you take any medicine or supplement. That's because some medicines can affect your breast milk. · You can help prevent sore nipples if you feed your baby in the correct position. Nurses will help you learn to do this. Choose the right birth control after your baby is born  · Women who are breastfeeding can still get pregnant. Use birth control if you don't want to get pregnant. · Intrauterine devices (IUDs) are very effective at preventing pregnancy and can provide birth control for 3 to 10 years, depending on the type. If you talk with your doctor before having your baby, the IUD can be placed right after you give birth. If you decide you want to get pregnant later, you can have it removed. They are safe to use while you are breastfeeding. · A hormonal implant is also very effective at preventing pregnancy. It is placed under the skin of the arm. This can be done right after you give birth. It releases the hormone progestin and prevents pregnancy for about 3 years.  This can also be removed by a doctor at any time. It is safe to use while you are breastfeeding. · Depo-Provera can be used while you are breastfeeding. It is a shot you get every 3 months. · Birth control pills work well. But you need a different kind of pill for the first few weeks after giving birth. And when you start taking these pills, you need to make sure to use another type of birth control for 7 days after you start your pack. · Diaphragms, cervical caps, and condoms with spermicide work less well after birth. If you have a diaphragm or cervical cap, talk to your doctor to see if you need a different size. · Tubal ligation (tying your tubes) and vasectomy are both permanent. These are good options if you are sure you are done having children. Where can you learn more? Go to http://www.gray.com/  Enter A811 in the search box to learn more about \"Week 39 of Your Pregnancy: Care Instructions. \"  Current as of: February 11, 2020               Content Version: 12.6  © 2006-2020 [employer]. Care instructions adapted under license by [employer] (which disclaims liability or warranty for this information). If you have questions about a medical condition or this instruction, always ask your healthcare professional. [address] any warranty or liability for your use of this information.

## 2020-11-09 ENCOUNTER — ROUTINE PRENATAL (OUTPATIENT)
Dept: OBGYN CLINIC | Age: 35
End: 2020-11-09
Payer: COMMERCIAL

## 2020-11-09 ENCOUNTER — HOSPITAL ENCOUNTER (EMERGENCY)
Age: 35
Discharge: HOME | End: 2020-11-09
Admitting: OBSTETRICS & GYNECOLOGY
Payer: COMMERCIAL

## 2020-11-09 VITALS
DIASTOLIC BLOOD PRESSURE: 64 MMHG | HEIGHT: 63 IN | BODY MASS INDEX: 22.68 KG/M2 | SYSTOLIC BLOOD PRESSURE: 114 MMHG | WEIGHT: 128 LBS

## 2020-11-09 VITALS
RESPIRATION RATE: 16 BRPM | HEIGHT: 63 IN | BODY MASS INDEX: 22.32 KG/M2 | DIASTOLIC BLOOD PRESSURE: 68 MMHG | HEART RATE: 80 BPM | SYSTOLIC BLOOD PRESSURE: 120 MMHG | TEMPERATURE: 97.7 F | WEIGHT: 126 LBS

## 2020-11-09 DIAGNOSIS — Z34.83 PRENATAL CARE, SUBSEQUENT PREGNANCY IN THIRD TRIMESTER: Primary | ICD-10-CM

## 2020-11-09 DIAGNOSIS — O47.1 FALSE LABOR AFTER 37 COMPLETED WEEKS OF GESTATION: ICD-10-CM

## 2020-11-09 DIAGNOSIS — Z3A.40 40 WEEKS GESTATION OF PREGNANCY: Primary | ICD-10-CM

## 2020-11-09 PROCEDURE — 99283 EMERGENCY DEPT VISIT LOW MDM: CPT

## 2020-11-09 PROCEDURE — MISCGLOBALOB GLOBAL OB: Performed by: OBSTETRICS & GYNECOLOGY

## 2020-11-09 NOTE — DISCHARGE INSTRUCTIONS
You came to the hospital because you thought you were in labor. This information may help you decide when you need to call your provider and return to the hospital.     Am I in Labor? ?  While each woman may feel contractions differently, these facts may help you decide if you are in true labor. A contraction is a painful tightening of the uterus. It may feel like the baby is sitting up, a bad backache or severe menstrual cramps. Sometimes women have contractions that do not cause cervical change- this is often called \"false labor. \"      SIGNS AND SYMPTOMS OF LABOR   Uterine cramping   Diarrhea   Increase or change in vaginal discharge   Low abdominal pressure   Dull low backache   Feeling like your baby is pushing down    When these symptoms are experienced. .. STOP what you are doing, lie on your side, drink two or three glasses of water or juice, and wait one hour. If the symptoms worsen call your care provider. If the symptoms go away, inform your care provider at the next visit that this happened. In TRUE LABOR contractions: In FALSE LABOR contractions:   * Occur regularly every 5 min or less * Occur irregularly   * Feel stronger and hurt more * Stay the same or space out   * Become stronger with walking * Strength stays the same or they hurt less  when walking   * Pinkish mucus or spotting maybe present          Call your provider if:   Regular, painful contractions every 5 minutes or less for one hour. Time your contractions.   You have a gush of fluid from your vagina.  Vaginal bleeding that is bright red, like a period (it is normal to have spotting after a vaginal exam or intercourse).   Your baby is not moving as much as usual- at least 4 fetal movements in 1 hour after drinking and resting on your side for 1 hour.  Fever 101 or above taken orally. Follow Up Appointment:  {follow up:05465} with Dr. Papo Sidhu at the office.       Medications: Continue prenatal medications and/or any new medications  Patient Education        Week 40 of Your Pregnancy: Care Instructions  Your Care Instructions     By week 36, you have reached your due date. Your baby could be coming any day. But it's a good idea to think ahead to the next few weeks and what might happen. If this is your first time having a baby, try not to worry. If you don't start labor on your own by 41 or 42 weeks, your doctor may recommend giving you medicines to start labor. This care sheet gives you information about how labor can be started. It also gives you some ideas about breathing exercises you can do if you start to feel anxious or if you are trying to relax. Follow-up care is a key part of your treatment and safety. Be sure to make and go to all appointments, and call your doctor if you are having problems. It's also a good idea to know your test results and keep a list of the medicines you take. How can you care for yourself at home? Learn how labor can be started  · If you and your baby are both healthy and ready, and if your cervix has started to open, your doctor may \"break your water\" (rupture the amniotic sac). This often starts labor. · If your cervix is not quite ready, you may get a medicine called Pitocin through an IV to start contractions. · If your cervix is still very firm, you may have prostaglandin tablets (misoprostol) placed in your vagina to soften the cervix. Try guided imagery to help you relax  · Find a comfortable place to sit or lie down. Close your eyes. · Start by just taking a few deep breaths to help you relax. · Picture a setting that is calm and peaceful. This could be a beach, a mountain setting, a meadow, or a scene that you choose. · Imagine your scene, and try to add some detail. For example, is there a breeze? What does the alfonso look like? Is it clear, or are there clouds? · It often helps to add a path to your scene.  For example, as you enter the meadow, imagine a path leading you through the meadow to the trees on the other side. As you follow the path farther into the [facility] you feel more and more relaxed. · When you are deep into your scene and are feeling relaxed, take a few minutes to breathe slowly and feel the calm. · When you are ready, slowly take yourself out of the scene back to the present. Tell yourself that you will feel relaxed and refreshed and will bring that sense of calm with you. · Count to 3, and open your eyes. Where can you learn more? Go to http://www.gray.com/  Enter T922 in the search box to learn more about \"Week 40 of Your Pregnancy: Care Instructions. \"  Current as of: February 11, 2020               Content Version: 12.6  © 9629-1486 [employer]. Care instructions adapted under license by [employer] (which disclaims liability or warranty for this information). If you have questions about a medical condition or this instruction, always ask your healthcare professional. [address] any warranty or liability for your use of this information.

## 2020-11-09 NOTE — PROGRESS NOTES
11/9/2020 12:33 AM  Pt arrived to [facility] with c/o intermittent contractions. No c/o LOF/VB. Positive fetal movement. 7379: MALIA Villa CNM at bedside SVE 2/high    Plan to walk for an hour and reassess. 0200:  Pt back in [facility], no c/o of pain. SVE unchanged. Plan to D/C home. 3606:  Reviewed discharge information with pt and spouse. All questions answered.

## 2020-11-09 NOTE — ED PROVIDER NOTES
[facility] Provider Note    Name: Brandon Goodrich MRN: [id number]  SSN: [id number]    YOB: 1985  Age: 30 y.o. Sex: female        Subjective:     Estimated Date of Delivery: 20  OB History        3    Para   2    Term   2            AB        Living   2       SAB        TAB        Ectopic        Molar        Multiple   0    Live Births   2                Ms. Jovanny Velarde presents to [facility] with pregnancy at 40w2d for contracitons 4-5 mins apart that have slowed down since arrival . Prenatal course was normal. Please see prenatal records for details. Prenatal Labs:   Lab Results   Component Value Date/Time    Rubella, External Immune 2020    GrBStrep, External Positive 10/14/2020    HBsAg, External Negative 2020    HIV, External Nonreactive 2020    Gonorrhea, External Negative 2020    Chlamydia, External Negative 2020        Patient Active Problem List    Diagnosis    Supervision of other normal pregnancy     Primary Provider:  Haylee Negro  P2  [address] by LMP/ US   Supa Hernandez negative  Hx TSVDx2; Elva(precip at [facility] [place] with Kelechi La; Dr Maricarmen Moncada pt)7lb 3oz  IOB labs: anemia 11.0; WNL  Genetic Screening:  Declines all  Anatomy:  normal  GTT: nl  Flu: done    TDAP: declined d/t history of reaction  Rhogam:  Opositive  GBS: Positive  Circ:         Umbilical hernia without obstruction and without gangrene     No specialty comments available.   Past Medical History:   Diagnosis Date    Anemia     During pregnancy, taking Iron    Kidney stone     Routine Papanicolaou smear 2016    Negative (no hpv) from [address], blistering      Past Surgical History:   Procedure Laterality Date    HX CHOLECYSTECTOMY  2010    HX OTHER SURGICAL      cholecystectomy    HX WISDOM TEETH EXTRACTION       Social History     Occupational History    Not on file   Tobacco Use    Smoking status: Never Smoker    Smokeless tobacco: Never Used    Tobacco comment: Never used vapor or e-cigs    Substance and Sexual Activity    Alcohol use: No    Drug use: No    Sexual activity: Yes     Partners: Male     Birth control/protection: None     Family History   Problem Relation Age of Onset    Thyroid Disease Mother     Cancer Father         Prostate     Thyroid Disease Maternal Grandmother     Stroke Maternal Grandmother     Thyroid Disease Other         6 Siblings of Mother h/o Hyperthyroidism        Allergies   Allergen Reactions    Amoxicillin Rash    Tetanus Vaccines And Toxoid Other (comments)     Fever     Prior to Admission medications    Medication Sig Start Date End Date Taking? Authorizing Provider   PNSANJIV [id number] fum/folic ac (PRENATAL PO) Take  by mouth. Provider, Historical        Review of Systems   Gastrointestinal: Positive for abdominal pain. Prior to arrival - contractions   All other systems reviewed and are negative. Objective:     Vitals:  Vitals:    11/09/20 0036 11/09/20 0043   BP: 120/68    Pulse: 80    Resp: 16    Temp: 97.7 °F (36.5 °C)    Weight:  57.2 kg (126 lb)   Height:  5' 3\" (1.6 m)        Physical Exam:  Physical Exam  Vitals signs and nursing note reviewed. Exam conducted with a chaperone present. Constitutional:       Appearance: Normal appearance. HENT:      Head: Normocephalic. Nose: Nose normal.   Neck:      Musculoskeletal: Normal range of motion. Skin:     General: Skin is warm and dry. Capillary Refill: Capillary refill takes less than 2 seconds. Neurological:      General: No focal deficit present. Mental Status: She is alert and oriented to person, place, and time. Psychiatric:         Behavior: Behavior normal.       Patient without distress.   Heart: Regular rate and rhythm  Lung: clear to auscultation throughout lung fields, no wheezes, no rales, no rhonchi and normal respiratory effort  Abdomen: soft, nontender, gravid  Fundus: soft and non tender  Perineum: blood absent, amniotic fluid absent  Cervical Exam: 50/-3  Lower Extremities: neg LUIS FERNANDO  Membranes:  Intact  Fetal Heart Rate: Reactive    FHR baseline 135 bpm, variability moderate, accelerations present, decelerations Absent. Uterine contractions were absent. Fransico Vasquez was informed of the NST results and her questions were answered. MDM  Number of Diagnoses or Management Options     Amount and/or Complexity of Data Reviewed  Review and summarize past medical records: yes (PNR)  Independent visualization of images, tracings, or specimens: yes (EFM)    Risk of Complications, Morbidity, and/or Mortality  Presenting problems: moderate  Diagnostic procedures: moderate  Management options: moderate    Critical Care  Total time providing critical care: 30-74 minutes    Patient Progress  Patient progress: improved      Procedures        Assessment/Plan:   31 yo  SIUP   40 weeks 2 days  Reassuring fetal status  GBS neg  Covid Neg  Plan:   Pt not laboring upon arrival to [facility] - decided to come for eval due to precip delivery with second baby and living some distance away. Unlabored cervix fetal head high - pt offered to walk for up to one hour then have a recheck - if no change d/c home. Pt and  in agreement.      Signed By:  Nuria Swenson CNM     2020

## 2020-11-09 NOTE — PROGRESS NOTES
Pt was seen in the [facility] last night. .   No concerns mentioned.  indxn this Thursday if not delivered

## 2020-11-09 NOTE — PATIENT INSTRUCTIONS
Week 40 of Your Pregnancy: Care Instructions Your Care Instructions By week 36, you have reached your due date. Your baby could be coming any day. But it's a good idea to think ahead to the next few weeks and what might happen. If this is your first time having a baby, try not to worry. If you don't start labor on your own by 41 or 42 weeks, your doctor may recommend giving you medicines to start labor. This care sheet gives you information about how labor can be started. It also gives you some ideas about breathing exercises you can do if you start to feel anxious or if you are trying to relax. Follow-up care is a key part of your treatment and safety. Be sure to make and go to all appointments, and call your doctor if you are having problems. It's also a good idea to know your test results and keep a list of the medicines you take. How can you care for yourself at home? Learn how labor can be started · If you and your baby are both healthy and ready, and if your cervix has started to open, your doctor may \"break your water\" (rupture the amniotic sac). This often starts labor. · If your cervix is not quite ready, you may get a medicine called Pitocin through an IV to start contractions. · If your cervix is still very firm, you may have prostaglandin tablets (misoprostol) placed in your vagina to soften the cervix. Try guided imagery to help you relax · Find a comfortable place to sit or lie down. Close your eyes. · Start by just taking a few deep breaths to help you relax. · Picture a setting that is calm and peaceful. This could be a beach, a mountain setting, a meadow, or a scene that you choose. · Imagine your scene, and try to add some detail. For example, is there a breeze? What does the alfonso look like? Is it clear, or are there clouds? · It often helps to add a path to your scene.  For example, as you enter the meadow, imagine a path leading you through the meadow to the trees on the other side. As you follow the path farther into the [facility] you feel more and more relaxed. · When you are deep into your scene and are feeling relaxed, take a few minutes to breathe slowly and feel the calm. · When you are ready, slowly take yourself out of the scene back to the present. Tell yourself that you will feel relaxed and refreshed and will bring that sense of calm with you. · Count to 3, and open your eyes. Where can you learn more? Go to http://marlen-elly.info/ Enter N597 in the search box to learn more about \"Week 40 of Your Pregnancy: Care Instructions. \" Current as of: February 11, 2020               Content Version: 12.6 © 7793-2319 [employer]. Care instructions adapted under license by [employer] (which disclaims liability or warranty for this information). If you have questions about a medical condition or this instruction, always ask your healthcare professional. [address] any warranty or liability for your use of this information.

## 2020-11-10 ENCOUNTER — HOSPITAL ENCOUNTER (INPATIENT)
Age: 35
LOS: 2 days | Discharge: HOME | End: 2020-11-12
Attending: OBSTETRICS & GYNECOLOGY | Admitting: OBSTETRICS & GYNECOLOGY
Payer: COMMERCIAL

## 2020-11-10 ENCOUNTER — ROUTINE PRENATAL (OUTPATIENT)
Dept: OBGYN CLINIC | Age: 35
End: 2020-11-10
Payer: COMMERCIAL

## 2020-11-10 ENCOUNTER — PATIENT OUTREACH (OUTPATIENT)
Dept: OTHER | Age: 35
End: 2020-11-10

## 2020-11-10 VITALS — SYSTOLIC BLOOD PRESSURE: 108 MMHG | BODY MASS INDEX: 22.53 KG/M2 | DIASTOLIC BLOOD PRESSURE: 76 MMHG | WEIGHT: 127.2 LBS

## 2020-11-10 DIAGNOSIS — Z34.80 SUPERVISION OF OTHER NORMAL PREGNANCY: Primary | ICD-10-CM

## 2020-11-10 LAB
ERYTHROCYTE [DISTWIDTH] IN BLOOD BY AUTOMATED COUNT: 12.9 % (ref 11.5–14.5)
HCT VFR BLD AUTO: 33.1 % (ref 35–47)
HGB BLD-MCNC: 10.7 G/DL (ref 11.5–16)
MCH RBC QN AUTO: 26.3 PG (ref 26–34)
MCHC RBC AUTO-ENTMCNC: 32.3 G/DL (ref 30–36.5)
MCV RBC AUTO: 81.3 FL (ref 80–99)
NRBC # BLD: 0 K/UL (ref 0–0.01)
NRBC BLD-RTO: 0 PER 100 WBC
PLATELET # BLD AUTO: 244 K/UL (ref 150–400)
PMV BLD AUTO: 11.3 FL (ref 8.9–12.9)
RBC # BLD AUTO: 4.07 M/UL (ref 3.8–5.2)
WBC # BLD AUTO: 9.9 K/UL (ref 3.6–11)

## 2020-11-10 PROCEDURE — 85027 COMPLETE CBC AUTOMATED: CPT

## 2020-11-10 PROCEDURE — 74011000258 HC RX REV CODE- 258: Performed by: OBSTETRICS & GYNECOLOGY

## 2020-11-10 PROCEDURE — 74011250636 HC RX REV CODE- 250/636: Performed by: OBSTETRICS & GYNECOLOGY

## 2020-11-10 PROCEDURE — 75410000002 HC LABOR FEE PER 1 HR

## 2020-11-10 PROCEDURE — 0502F SUBSEQUENT PRENATAL CARE: CPT | Performed by: OBSTETRICS & GYNECOLOGY

## 2020-11-10 PROCEDURE — 36415 COLL VENOUS BLD VENIPUNCTURE: CPT

## 2020-11-10 PROCEDURE — 65270000029 HC RM PRIVATE

## 2020-11-10 RX ORDER — TERBUTALINE SULFATE 1 MG/ML
0.25 INJECTION SUBCUTANEOUS AS NEEDED
Status: DISCONTINUED | OUTPATIENT
Start: 2020-11-10 | End: 2020-11-11

## 2020-11-10 RX ORDER — OXYTOCIN/RINGER'S LACTATE 30/500 ML
PLASTIC BAG, INJECTION (ML) INTRAVENOUS
Status: COMPLETED
Start: 2020-11-10 | End: 2020-11-11

## 2020-11-10 RX ADMIN — CEFAZOLIN 1 G: 1 INJECTION, POWDER, FOR SOLUTION INTRAMUSCULAR; INTRAVENOUS at 18:58

## 2020-11-10 NOTE — PROGRESS NOTES
CM outreach      OB patient  - [facility] - Followed by Dr. Emily Melchor   * ED  - contractions / DC home. Patient on report as with discharge from ED visit. 11:00 am   Initial attempt to contact patient for transitions of care. Unable to leave message on voicemail \"Mailbox full. \"  Will attempt to contact again. Chart Review:  OB visits:     Working [facility]: 20  based on Alternate [facility] Entry    Based On  [facility]  GA Dif  GA  User  Date    Last Menstrual Period on 20  -1w0d   System action -        CHESTER 20 Problems (from 20 to present)     No problems associated with this episode. OB History         3     Para    2     Term    2               AB          Living    2       SAB          TAB          Ectopic          Molar          Multiple    0     Live Births    2             Assessment/Plan:   29 yo  SIUP   40 weeks 2 days  Reassuring fetal status  GBS neg  Covid Neg  Plan:   Pt not laboring upon arrival to [facility] - decided to come for eval due to precip delivery with second baby and living some distance away. Unlabored cervix fetal head high - pt offered to walk for up to one hour then have a recheck - if no change d/c home. Pt and  in agreement.

## 2020-11-10 NOTE — H&P
History & Physical    Name: Gera Schneider MRN: [id number]  SSN: [id number]    YOB: 1985  Age: 30 y.o. Sex: female        Subjective:     Estimated Date of Delivery: 20  OB History        3    Para   2    Term   2            AB        Living   2       SAB        TAB        Ectopic        Molar        Multiple   0    Live Births   2                Ms. Loulou Hinton is admitted with pregnancy at 40w3d for active labor. Prenatal course as below. Seen earlier in office with cervical exam 70/3/vtx  BOWI and now reports contractions closer and stronger; no leakage of fluid. Please see prenatal records for details. Patient Active Problem List    Diagnosis    Supervision of other normal pregnancy     Primary Provider:  Cyndi Brothers  P2  [facility] by LMP/ US   Kyler Bynum negative  Hx TSVDx2; Elva(precip at [facility] with Ova Beech; Dr Trish Can pt)7lb 3oz  IOB labs: anemia 11.0; WNL  Genetic Screening:  Declines all  Anatomy:  normal  GTT: nl  Flu: done    TDAP: declined d/t history of reaction  Rhogam:  Opositive  GBS: Positive  Circ:         Umbilical hernia without obstruction and without gangrene     - Unreliable LMP, US for dating  - US-  shows viable IUP 7+2 -> [place]=18, nl YS, nl CM. Does show [facility]  - US (18) 9+1 -> [place]=18  - EOB ur cx contam -> rpt ur cx 50-100K enterococcus -> Macrobid, PANKAJ (3/19) neg  - MSAFP low risk. Declines aneuploidy.  - US (18) 20+1 @ 20+2. Fetal anatomy nl (does not want to know gender). Placenta fundal, appears thickened in some images (?artifact) -> MFM  - MFM US (18) placenta appears thickened in some views (6-7mm), but probably d/t left lat location; o/w appears nl  - MFM US (18) 30+5 @ 30+0. 1655gm (63%). HANNAH=25.4. Placenta thick in some views (11cm), prob nl variant  - MFM US (18) 34+2 @ 34+3. 2746 (67%). HANNAH=16.  Placenta left lateral, some views appear thickened, prob nl variant/artifact  - declines TDAP (had high fever to 103 with prev vacc)  Past Medical History:   Diagnosis Date    Anemia     During pregnancy, taking Iron    Kidney stone     2015    Routine Papanicolaou smear 07/20/2016    Negative (no hpv) from [facility] [place], elma      Past Surgical History:   Procedure Laterality Date    HX CHOLECYSTECTOMY  07/2010    HX OTHER SURGICAL  2011    cholecystectomy    HX WISDOM TEETH EXTRACTION  2009     Social History     Occupational History    Not on file   Tobacco Use    Smoking status: Never Smoker    Smokeless tobacco: Never Used    Tobacco comment: Never used vapor or e-cigs    Substance and Sexual Activity    Alcohol use: No    Drug use: No    Sexual activity: Yes     Partners: Male     Birth control/protection: None     Family History   Problem Relation Age of Onset    Thyroid Disease Mother     Cancer Father         Prostate     Thyroid Disease Maternal Grandmother     Stroke Maternal Grandmother     Thyroid Disease Other         6 Siblings of Mother h/o Hyperthyroidism        Allergies   Allergen Reactions    Amoxicillin Rash    Tetanus Vaccines And Toxoid Other (comments)     Fever     Prior to Admission medications    Medication Sig Start Date End Date Taking? Authorizing Provider   BRENDAN [id number] fum/folic ac (PRENATAL PO) Take  by mouth. Provider, Historical        Review of Systems: A comprehensive review of systems was negative except for that written in the HPI. Objective:     Vitals: There were no vitals filed for this visit. Physical Exam:  Patient without distress.   Heart: Regular rate and rhythm  Lung: clear to auscultation throughout lung fields, no wheezes, no rales, no rhonchi and normal respiratory effort  Cervical Exam: 3 cm dilated    70% effaced    -2 station    Presenting Part: cephalic in office approx 3pm today  Membranes:  Intact  Fetal Heart Rate: Reactive    Prenatal Labs:   Lab Results   Component Value Date/Time    Rubella, External Immune 06/17/2020    GrBStrep, External Positive 10/14/2020    HBsAg, External Negative 06/17/2020    HIV, External Nonreactive 06/17/2020    Gonorrhea, External Negative 06/17/2020    Chlamydia, External Negative 06/17/2020        Assessment/Plan:     Plan: Admit for Reassuring fetal status, Labor  Progressing normally  Continue expectant management, Continue plan for vaginal delivery. Group B Strep was positive, will treat prophylactically with ancef.     Signed By:  Lisy Valentine MD     November 10, 2020

## 2020-11-10 NOTE — PATIENT INSTRUCTIONS
Week 40 of Your Pregnancy: Care Instructions  Your Care Instructions     By week 40, you have reached your due date. Your baby could be coming any day. But it's a good idea to think ahead to the next few weeks and what might happen. If this is your first time having a baby, try not to worry. If you don't start labor on your own by 41 or 42 weeks, your doctor may recommend giving you medicines to start labor. This care sheet gives you information about how labor can be started. It also gives you some ideas about breathing exercises you can do if you start to feel anxious or if you are trying to relax. Follow-up care is a key part of your treatment and safety. Be sure to make and go to all appointments, and call your doctor if you are having problems. It's also a good idea to know your test results and keep a list of the medicines you take. How can you care for yourself at home? Learn how labor can be started  · If you and your baby are both healthy and ready, and if your cervix has started to open, your doctor may \"break your water\" (rupture the amniotic sac). This often starts labor. · If your cervix is not quite ready, you may get a medicine called Pitocin through an IV to start contractions. · If your cervix is still very firm, you may have prostaglandin tablets (misoprostol) placed in your vagina to soften the cervix. Try guided imagery to help you relax  · Find a comfortable place to sit or lie down. Close your eyes. · Start by just taking a few deep breaths to help you relax. · Picture a setting that is calm and peaceful. This could be a beach, a mountain setting, a meadow, or a scene that you choose. · Imagine your scene, and try to add some detail. For example, is there a breeze? What does the alfonso look like? Is it clear, or are there clouds? · It often helps to add a path to your scene.  For example, as you enter the meadow, imagine a path leading you through the meadow to the trees on the other side. As you follow the path farther into the [facility] you feel more and more relaxed. · When you are deep into your scene and are feeling relaxed, take a few minutes to breathe slowly and feel the calm. · When you are ready, slowly take yourself out of the scene back to the present. Tell yourself that you will feel relaxed and refreshed and will bring that sense of calm with you. · Count to 3, and open your eyes. Where can you learn more? Go to http://marlen-elly.info/  Enter T922 in the search box to learn more about \"Week 40 of Your Pregnancy: Care Instructions. \"  Current as of: February 11, 2020               Content Version: 12.6  © 6132-2755 [employer]. Care instructions adapted under license by [employer] (which disclaims liability or warranty for this information). If you have questions about a medical condition or this instruction, always ask your healthcare professional. [address] any warranty or liability for your use of this information.

## 2020-11-11 ENCOUNTER — ANESTHESIA (OUTPATIENT)
Dept: LABOR AND DELIVERY | Age: 35
End: 2020-11-11
Payer: COMMERCIAL

## 2020-11-11 ENCOUNTER — ANESTHESIA EVENT (OUTPATIENT)
Dept: LABOR AND DELIVERY | Age: 35
End: 2020-11-11
Payer: COMMERCIAL

## 2020-11-11 PROCEDURE — 74011250636 HC RX REV CODE- 250/636: Performed by: ANESTHESIOLOGY

## 2020-11-11 PROCEDURE — 74011250637 HC RX REV CODE- 250/637: Performed by: OBSTETRICS & GYNECOLOGY

## 2020-11-11 PROCEDURE — 74011250636 HC RX REV CODE- 250/636: Performed by: OBSTETRICS & GYNECOLOGY

## 2020-11-11 PROCEDURE — 77030014125 HC TY EPDRL BBMI -B: Performed by: ANESTHESIOLOGY

## 2020-11-11 PROCEDURE — 74011000258 HC RX REV CODE- 258: Performed by: OBSTETRICS & GYNECOLOGY

## 2020-11-11 PROCEDURE — 74011000250 HC RX REV CODE- 250: Performed by: ANESTHESIOLOGY

## 2020-11-11 PROCEDURE — 74011000250 HC RX REV CODE- 250

## 2020-11-11 PROCEDURE — 75410000003 HC RECOV DEL/VAG/CSECN EA 0.5 HR

## 2020-11-11 PROCEDURE — 10907ZC DRAINAGE OF AMNIOTIC FLUID, THERAPEUTIC FROM PRODUCTS OF CONCEPTION, VIA NATURAL OR ARTIFICIAL OPENING: ICD-10-PCS | Performed by: OBSTETRICS & GYNECOLOGY

## 2020-11-11 PROCEDURE — 75410000002 HC LABOR FEE PER 1 HR

## 2020-11-11 PROCEDURE — 59400 OBSTETRICAL CARE: CPT | Performed by: OBSTETRICS & GYNECOLOGY

## 2020-11-11 PROCEDURE — 74011250637 HC RX REV CODE- 250/637: Performed by: ADVANCED PRACTICE MIDWIFE

## 2020-11-11 PROCEDURE — 74011250636 HC RX REV CODE- 250/636

## 2020-11-11 PROCEDURE — 76060000078 HC EPIDURAL ANESTHESIA

## 2020-11-11 PROCEDURE — 65410000002 HC RM PRIVATE OB

## 2020-11-11 PROCEDURE — 75410000000 HC DELIVERY VAGINAL/SINGLE

## 2020-11-11 RX ORDER — OXYTOCIN/RINGER'S LACTATE 30/500 ML
95 PLASTIC BAG, INJECTION (ML) INTRAVENOUS AS NEEDED
Status: DISCONTINUED | OUTPATIENT
Start: 2020-11-11 | End: 2020-11-13 | Stop reason: HOSPADM

## 2020-11-11 RX ORDER — OXYTOCIN/RINGER'S LACTATE 30/500 ML
10 PLASTIC BAG, INJECTION (ML) INTRAVENOUS AS NEEDED
Status: DISCONTINUED | OUTPATIENT
Start: 2020-11-11 | End: 2020-11-11

## 2020-11-11 RX ORDER — OXYTOCIN/RINGER'S LACTATE 30/500 ML
95 PLASTIC BAG, INJECTION (ML) INTRAVENOUS AS NEEDED
Status: DISCONTINUED | OUTPATIENT
Start: 2020-11-11 | End: 2020-11-11

## 2020-11-11 RX ORDER — ACETAMINOPHEN 325 MG/1
650 TABLET ORAL
Status: DISCONTINUED | OUTPATIENT
Start: 2020-11-11 | End: 2020-11-13 | Stop reason: HOSPADM

## 2020-11-11 RX ORDER — IBUPROFEN 400 MG/1
800 TABLET ORAL EVERY 8 HOURS
Status: DISCONTINUED | OUTPATIENT
Start: 2020-11-11 | End: 2020-11-13 | Stop reason: HOSPADM

## 2020-11-11 RX ORDER — BUPIVACAINE HYDROCHLORIDE 5 MG/ML
30 INJECTION, SOLUTION EPIDURAL; INTRACAUDAL AS NEEDED
Status: DISCONTINUED | OUTPATIENT
Start: 2020-11-11 | End: 2020-11-11

## 2020-11-11 RX ORDER — EPHEDRINE SULFATE/0.9% NACL/PF 50 MG/5 ML
10 SYRINGE (ML) INTRAVENOUS
Status: COMPLETED | OUTPATIENT
Start: 2020-11-11 | End: 2020-11-11

## 2020-11-11 RX ORDER — FENTANYL/BUPIVACAINE/NS/PF 2-1250MCG
1-16 PREFILLED PUMP RESERVOIR EPIDURAL CONTINUOUS
Status: DISCONTINUED | OUTPATIENT
Start: 2020-11-11 | End: 2020-11-11

## 2020-11-11 RX ORDER — FENTANYL/BUPIVACAINE/NS/PF 2-1250MCG
PREFILLED PUMP RESERVOIR EPIDURAL
Status: DISCONTINUED
Start: 2020-11-11 | End: 2020-11-11

## 2020-11-11 RX ORDER — ZOLPIDEM TARTRATE 5 MG/1
5 TABLET ORAL
Status: DISCONTINUED | OUTPATIENT
Start: 2020-11-11 | End: 2020-11-13 | Stop reason: HOSPADM

## 2020-11-11 RX ORDER — FENTANYL CITRATE 50 UG/ML
INJECTION, SOLUTION INTRAMUSCULAR; INTRAVENOUS AS NEEDED
Status: DISCONTINUED | OUTPATIENT
Start: 2020-11-11 | End: 2020-11-11 | Stop reason: HOSPADM

## 2020-11-11 RX ORDER — SODIUM CHLORIDE 0.9 % (FLUSH) 0.9 %
5-40 SYRINGE (ML) INJECTION EVERY 8 HOURS
Status: DISCONTINUED | OUTPATIENT
Start: 2020-11-11 | End: 2020-11-13 | Stop reason: HOSPADM

## 2020-11-11 RX ORDER — FENTANYL CITRATE 50 UG/ML
100 INJECTION, SOLUTION INTRAMUSCULAR; INTRAVENOUS ONCE
Status: DISCONTINUED | OUTPATIENT
Start: 2020-11-11 | End: 2020-11-11

## 2020-11-11 RX ORDER — SIMETHICONE 80 MG
80 TABLET,CHEWABLE ORAL
Status: DISCONTINUED | OUTPATIENT
Start: 2020-11-11 | End: 2020-11-13 | Stop reason: HOSPADM

## 2020-11-11 RX ORDER — DOCUSATE SODIUM 100 MG/1
100 CAPSULE, LIQUID FILLED ORAL
Status: DISCONTINUED | OUTPATIENT
Start: 2020-11-11 | End: 2020-11-13 | Stop reason: HOSPADM

## 2020-11-11 RX ORDER — EPHEDRINE SULFATE/0.9% NACL/PF 50 MG/5 ML
SYRINGE (ML) INTRAVENOUS
Status: COMPLETED
Start: 2020-11-11 | End: 2020-11-11

## 2020-11-11 RX ORDER — BUPIVACAINE HYDROCHLORIDE 5 MG/ML
INJECTION, SOLUTION EPIDURAL; INTRACAUDAL
Status: DISCONTINUED
Start: 2020-11-11 | End: 2020-11-11

## 2020-11-11 RX ORDER — OXYTOCIN/RINGER'S LACTATE 30/500 ML
10 PLASTIC BAG, INJECTION (ML) INTRAVENOUS AS NEEDED
Status: DISCONTINUED | OUTPATIENT
Start: 2020-11-11 | End: 2020-11-13 | Stop reason: HOSPADM

## 2020-11-11 RX ORDER — OXYTOCIN/RINGER'S LACTATE 30/500 ML
0-20 PLASTIC BAG, INJECTION (ML) INTRAVENOUS
Status: DISCONTINUED | OUTPATIENT
Start: 2020-11-11 | End: 2020-11-11

## 2020-11-11 RX ORDER — POLYETHYLENE GLYCOL 3350 17 G/17G
17 POWDER, FOR SOLUTION ORAL DAILY PRN
Status: DISCONTINUED | OUTPATIENT
Start: 2020-11-11 | End: 2020-11-13 | Stop reason: HOSPADM

## 2020-11-11 RX ORDER — HYDROCORTISONE 1 %
CREAM (GRAM) TOPICAL AS NEEDED
Status: DISCONTINUED | OUTPATIENT
Start: 2020-11-11 | End: 2020-11-13 | Stop reason: HOSPADM

## 2020-11-11 RX ORDER — LIDOCAINE HYDROCHLORIDE AND EPINEPHRINE 15; 5 MG/ML; UG/ML
INJECTION, SOLUTION EPIDURAL
Status: COMPLETED | OUTPATIENT
Start: 2020-11-11 | End: 2020-11-11

## 2020-11-11 RX ORDER — NALOXONE HYDROCHLORIDE 0.4 MG/ML
0.4 INJECTION, SOLUTION INTRAMUSCULAR; INTRAVENOUS; SUBCUTANEOUS AS NEEDED
Status: DISCONTINUED | OUTPATIENT
Start: 2020-11-11 | End: 2020-11-11

## 2020-11-11 RX ORDER — SODIUM CHLORIDE 0.9 % (FLUSH) 0.9 %
5-40 SYRINGE (ML) INJECTION AS NEEDED
Status: DISCONTINUED | OUTPATIENT
Start: 2020-11-11 | End: 2020-11-13 | Stop reason: HOSPADM

## 2020-11-11 RX ORDER — BUPIVACAINE HYDROCHLORIDE 5 MG/ML
INJECTION, SOLUTION EPIDURAL; INTRACAUDAL AS NEEDED
Status: DISCONTINUED | OUTPATIENT
Start: 2020-11-11 | End: 2020-11-11 | Stop reason: HOSPADM

## 2020-11-11 RX ORDER — HYDROCODONE BITARTRATE AND ACETAMINOPHEN 5; 325 MG/1; MG/1
1 TABLET ORAL
Status: DISCONTINUED | OUTPATIENT
Start: 2020-11-11 | End: 2020-11-13 | Stop reason: HOSPADM

## 2020-11-11 RX ORDER — HYDROCORTISONE ACETATE PRAMOXINE HCL 2.5; 1 G/100G; G/100G
CREAM TOPICAL AS NEEDED
Status: DISCONTINUED | OUTPATIENT
Start: 2020-11-11 | End: 2020-11-13 | Stop reason: HOSPADM

## 2020-11-11 RX ORDER — FENTANYL CITRATE 50 UG/ML
INJECTION, SOLUTION INTRAMUSCULAR; INTRAVENOUS
Status: DISCONTINUED
Start: 2020-11-11 | End: 2020-11-11

## 2020-11-11 RX ORDER — AMMONIA 15 % (W/V)
1 AMPUL (EA) INHALATION AS NEEDED
Status: DISCONTINUED | OUTPATIENT
Start: 2020-11-11 | End: 2020-11-13 | Stop reason: HOSPADM

## 2020-11-11 RX ADMIN — Medication 2 MILLI-UNITS/MIN: at 08:44

## 2020-11-11 RX ADMIN — Medication 10 MG: at 08:54

## 2020-11-11 RX ADMIN — BUPIVACAINE HYDROCHLORIDE 5 ML: 5 INJECTION, SOLUTION EPIDURAL; INTRACAUDAL; PERINEURAL at 06:45

## 2020-11-11 RX ADMIN — FENTANYL CITRATE 100 MCG: 50 INJECTION, SOLUTION INTRAMUSCULAR; INTRAVENOUS at 06:45

## 2020-11-11 RX ADMIN — LIDOCAINE HYDROCHLORIDE,EPINEPHRINE BITARTRATE 4.5 ML: 15; .005 INJECTION, SOLUTION EPIDURAL; INFILTRATION; INTRACAUDAL; PERINEURAL at 06:42

## 2020-11-11 RX ADMIN — POLYETHYLENE GLYCOL 3350 17 G: 17 POWDER, FOR SOLUTION ORAL at 22:41

## 2020-11-11 RX ADMIN — ACETAMINOPHEN 650 MG: 325 TABLET ORAL at 22:40

## 2020-11-11 RX ADMIN — Medication 10 ML/HR: at 06:58

## 2020-11-11 RX ADMIN — IBUPROFEN 800 MG: 400 TABLET, FILM COATED ORAL at 12:05

## 2020-11-11 RX ADMIN — Medication 10 MG: at 06:56

## 2020-11-11 RX ADMIN — IBUPROFEN 800 MG: 400 TABLET, FILM COATED ORAL at 19:40

## 2020-11-11 RX ADMIN — OXYTOCIN 2 MILLI-UNITS/MIN: 10 INJECTION INTRAVENOUS at 08:44

## 2020-11-11 RX ADMIN — CEFAZOLIN 1 G: 1 INJECTION, POWDER, FOR SOLUTION INTRAMUSCULAR; INTRAVENOUS at 03:06

## 2020-11-11 NOTE — PROGRESS NOTES
1815 Pt arrived on labor and delivery in labor. 1902 Ancef started. 1935 Report given to MIGUEL Thomas

## 2020-11-11 NOTE — L&D DELIVERY NOTE
Delivery Summary    Patient: Jennifer Sun MRN: [id number]  SSN: [id number]    YOB: 1985  Age: 30 y.o. Sex: female       Information for the patient's :  Pavithra Zepeda [[id number]]       Labor Events:    Labor: No    Steroids: None   Cervical Ripening Date/Time:       Cervical Ripening Type: None   Antibiotics During Labor: Yes   Rupture Identifier: Sac 1    Rupture Date/Time: 2020 7:05 AM   Rupture Type: AROM   Amniotic Fluid Volume: Moderate    Amniotic Fluid Description: Clear    Amniotic Fluid Odor: None    Induction:         Induction Date/Time:        Indications for Induction:      Augmentation:     Augmentation Date/Time:      Indications for Augmentation:     Labor complications: Additional complications:        Delivery Events:  Indications For Episiotomy:     Episiotomy: None   Perineal Laceration(s): 1st   Repaired:     Periurethral Laceration Location:      Repaired:     Labial Laceration Location:     Repaired:     Sulcal Laceration Location:     Repaired:     Vaginal Laceration Location:     Repaired:     Cervical Laceration Location:     Repaired:     Repair Suture: None   Number of Repair Packets:     Estimated Blood Loss (ml):  ml   Quantitative Blood Loss (ml)                Delivery Date: 2020    Delivery Time: 9:45 AM  Delivery Type: Vaginal, Spontaneous  Sex:  Male    Gestational Age: 36w2d   Delivery Clinician:  Anshul Sheth  Living Status: Living   Delivery Location: L&D            APGARS  One minute Five minutes Ten minutes   Skin color: 0   1        Heart rate: 2   2        Grimace: 2   2        Muscle tone: 2   2        Breathin   2        Totals: 8   9            Presentation: Vertex    Position:        Resuscitation Method:  Suctioning-bulb; Tactile Stimulation     Meconium Stained: None      Cord Information: 3 Vessels  Complications:    Cord around: trunk  Delayed cord clamping?  Yes  Cord clamped date/time: Disposition of Cord Blood: Lab    Blood Gases Sent?:      Placenta:  Date/Time: 2020  9:50 AM  Removal: Spontaneous      Appearance: Normal      Measurements:  Birth Weight:        Birth Length:        Head Circumference:        Chest Circumference:       Abdominal Girth: Other Providers:   Rose Uriarte, Corina Collins, Obstetrician;Primary Nurse;Primary  Nurse           Group B Strep:   Lab Results   Component Value Date/Time    GrBStrep, External Positive 10/14/2020     Information for the patient's :  [facility] Yamile [[id number]]     Lab Results   Component Value Date/Time    ABO/Rh(D) O POSITIVE 2020 10:06 AM    ELIO IgG NEG 2020 10:06 AM    Bilirubin if ELIO pos: IF DIRECT MAR POSITIVE, BILIRUBIN TO FOLLOW 2020 10:06 AM      No results for input(s): PCO2CB, PO2CB, HCO3I, SO2I, IBD, PTEMPI, SPECTI, PHICB, ISITE, IDEV, IALLEN in the last 72 hours.

## 2020-11-11 NOTE — ANESTHESIA POSTPROCEDURE EVALUATION
Post-Anesthesia Evaluation and Assessment    Patient: Felicia Smalls MRN: [id number]  SSN: [id number]    YOB: 1985  Age: 30 y.o. Sex: female      I have evaluated the patient and they are stable and ready for discharge from the PACU. Cardiovascular Function/Vital Signs  Visit Vitals  /63 (BP 1 Location: Right arm, BP Patient Position: At rest;Sitting)   Pulse 82   Temp 36.6 °C (97.8 °F)   Resp 16   SpO2 100%   Breastfeeding Unknown       Patient is status post * No anesthesia type entered * anesthesia for * No procedures listed *. Nausea/Vomiting: None    Postoperative hydration reviewed and adequate. Pain:  Pain Scale 1: Numeric (0 - 10) (11/11/20 1248)  Pain Intensity 1: 0 (11/11/20 1248)   Managed    Neurological Status: At baseline    Mental Status, Level of Consciousness: Alert and  oriented to person, place, and time    Pulmonary Status:   O2 Device: Room air (11/11/20 1248)   Adequate oxygenation and airway patent    Complications related to anesthesia: None    Post-anesthesia assessment completed. No concerns    Signed By: Alex Toledo MD     November 11, 2020              * No procedures listed *.    epidural    <BSHSIANPOST>    INITIAL Post-op Vital signs: No vitals data found for the desired time range.

## 2020-11-11 NOTE — PROGRESS NOTES
0745 report received from MIGUEL Wilks RN    0800 Plan is to start pitocin around 0815.    0850 Pt straight cathd for 50ml of urine    0910 Pt on left side with peanut ball in place. 0916 Pt in [address] with this RN    0930 Pt feeling increased pressure. 9987 SVE is c/c/+2, Cristy Carrillo called at this time to start pushing. Dedrick Lehman at bedside for delivery. 46 Healthy baby boy delivered by Cristy Carrillo    1240 TRANSFER - OUT REPORT:    Verbal report given to RAINA Henriquez on [place] [id number]  being transferred to MIU for routine progression of care       Report consisted of patients Situation, Background, Assessment and   Recommendations(SBAR). Information from the following report(s) SBAR, Intake/Output and MAR was reviewed with the receiving nurse. Lines:   Peripheral IV 11/10/20 Forearm (Active)   Site Assessment Clean, dry, & intact 11/11/20 0900   Phlebitis Assessment 0 11/11/20 0900   Infiltration Assessment 0 11/10/20 2000   Dressing Status Clean, dry, & intact 11/11/20 0900   Dressing Type Tape;Transparent 11/10/20 2000   Hub Color/Line Status Capped 11/10/20 2000        Opportunity for questions and clarification was provided.       Patient transported with:   Registered Nurse

## 2020-11-11 NOTE — ROUTINE PROCESS
1247:TRANSFER - IN REPORT:    Verbal report received from MIGUEL Wynne(name) on [facility] 9893  being received from L+D(unit) for routine progression of care      Report consisted of patients Situation, Background, Assessment and   Recommendations(SBAR). Information from the following report(s) SBAR was reviewed with the receiving nurse. Opportunity for questions and clarification was provided. Assessment completed upon patients arrival to unit and care assumed.

## 2020-11-11 NOTE — PROGRESS NOTES
1945 Bedside and Verbal shift change report given to MIGUEL Cornejo RN by Ajhijudd . Flo Lubin RN. Report included the following information SBAR, Intake/Output and MAR.     1955 Dr. Awilda Garnett at the bedside. Reviewing FHT and discussing plan of care. Ok to take patient off monitor at this time. 2115 Patient requesting SVE. SVE 4-5/70/-2. Patient would like to walk at this time and continue with plan of care. 2130 Patient stated that she felt \"some leaking of fluid\". RN unable to see any fluid on pad. Will continue to monitor. [address] with Dr. Awilda Garnett and updated on patient current status. Continue with patient current plan of care. 0000 Patient states that her contractions \"have lessened in frequency and intensity. \" Patient would like to try and rest for now. 0510 Patient able to rest over last 5 hours. Patient requesting SVE at this time. SVE 6/80/-2. Discussed pain options with patient. Patient would like to hold off on epidural at this time. 0600 SVE complete due to FHR on strip. SVE 8/100/-2. Patient requesting epidural.    [id number] Dr. Awilda Garnett at the bedside. Reviewing FHT and discussing plan of care. 0051 Dr. Zulma Kwon at the bedside for epidural placement. [id number] Dr. Awilda Garnett at the bedside. Reviewing FHT and discussing plan of care. AROM for moderate amount of clear fluid. 0740 Bedside and Verbal shift change report given to MIGUEL Lubin RN [facility] [place] Cornejo RN. Report included the following information SBAR, Intake/Output and MAR.

## 2020-11-12 ENCOUNTER — PATIENT OUTREACH (OUTPATIENT)
Dept: OTHER | Age: 35
End: 2020-11-12

## 2020-11-12 VITALS
SYSTOLIC BLOOD PRESSURE: 96 MMHG | HEART RATE: 83 BPM | OXYGEN SATURATION: 100 % | RESPIRATION RATE: 14 BRPM | TEMPERATURE: 98.2 F | DIASTOLIC BLOOD PRESSURE: 61 MMHG

## 2020-11-12 PROCEDURE — 74011250637 HC RX REV CODE- 250/637: Performed by: OBSTETRICS & GYNECOLOGY

## 2020-11-12 RX ADMIN — IBUPROFEN 800 MG: 400 TABLET, FILM COATED ORAL at 13:47

## 2020-11-12 RX ADMIN — DOCUSATE SODIUM 100 MG: 100 CAPSULE, LIQUID FILLED ORAL at 10:03

## 2020-11-12 RX ADMIN — IBUPROFEN 800 MG: 400 TABLET, FILM COATED ORAL at 06:04

## 2020-11-12 NOTE — PROGRESS NOTES
Post-Partum Day Number 1 Progress Note    Frances Javier     Assessment: Doing well, post partum day 1    Plan:  1. Continue routine postpartum and perineal care as well as maternal education. 2. No circumcision desired for infant  3. Desires early discharge    Information for the patient's :  Nuria Ramirez [[id number]]   Vaginal, Spontaneous    Patient doing well without significant complaint. Voiding without difficulty, normal lochia. Vitals:  Visit Vitals  /62 (BP 1 Location: Right arm, BP Patient Position: At rest)   Pulse 66   Temp 97.8 °F (36.6 °C)   Resp 16   LMP 2020   SpO2 100%   Breastfeeding Unknown     Temp (24hrs), Av.8 °F (36.6 °C), Min:97.6 °F (36.4 °C), Max:98.1 °F (36.7 °C)        Exam:   Patient without distress. Abdomen soft, fundus firm, nontender                Perineum with normal lochia noted. Lower extremities are negative for swelling, cords or tenderness.     Labs:     Lab Results   Component Value Date/Time    WBC 9.9 11/10/2020 06:40 PM    WBC 5.8 2020 10:39 AM    WBC 6.0 2020 04:00 PM    WBC 7.2 2018 01:15 AM    WBC 6.4 05/10/2018 01:57 PM    WBC 5.8 2018 04:15 PM    WBC 10.7 2017 04:19 PM    WBC 4.5 2017 10:41 AM    WBC 4.3 2016 11:03 AM    WBC 5.1 2014 10:01 AM    HGB 10.7 (L) 11/10/2020 06:40 PM    HGB 10.5 (L) 2020 10:39 AM    HGB 11.0 (L) 2020 04:00 PM    HGB 11.7 2018 01:15 AM    HGB 11.2 05/10/2018 01:57 PM    HGB 12.3 2018 04:15 PM    HGB 12.3 2017 04:19 PM    HGB 10.4 (L) 2017 10:41 AM    HGB 11.7 2016 11:03 AM    HGB 13.3 2014 10:01 AM    HCT 33.1 (L) 11/10/2020 06:40 PM    HCT 30.8 (L) 2020 10:39 AM    HCT 32.0 (L) 2020 04:00 PM    HCT 34.3 (L) 2018 01:15 AM    HCT 33.1 (L) 05/10/2018 01:57 PM    HCT 36.6 2018 04:15 PM    HCT 36.5 2017 04:19 PM    HCT 30.2 (L) 2017 10:41 AM    HCT 34.7 12/22/2016 11:03 AM    HCT 40.8 03/24/2014 10:01 AM    PLATELET 364 79/05/7928 06:40 PM    PLATELET 490 26/50/9750 10:39 AM    PLATELET 272 31/45/0352 04:00 PM    PLATELET 761 53/52/1010 01:15 AM    PLATELET 910 43/34/6255 01:57 PM    PLATELET 262 85/85/3746 04:15 PM    PLATELET 478 65/04/1473 04:19 PM    PLATELET 132 02/25/3475 10:41 AM    PLATELET 052 68/23/7443 11:03 AM    PLATELET 778 05/34/2713 10:01 AM    Hgb, External 10.5 08/14/2020    Hgb, External 11.0 06/17/2020    Hgb, External 11.2 05/10/2018    Hgb, External 12.3 01/25/2018    Hgb, External 10.4 03/23/2017    Hgb, External 11.7 12/22/2016    Hct, External 30.8 08/14/2020    Hct, External 32.0 06/17/2020    Hct, External 33.1 05/10/2018    Hct, External 36.6 01/25/2018    Hct, External 30.2 03/23/2017    Hct, External 34.7 12/22/2016    Platelet cnt., External 237 08/14/2020    Platelet cnt., External 229 06/17/2020    Platelet cnt., External 236 05/10/2018    Platelet cnt., External 275 01/25/2018    Platelet cnt., External 229 03/23/2017    Platelet cnt., External 251 12/22/2016       No results found for this or any previous visit (from the past 24 hour(s)).

## 2020-11-12 NOTE — PROGRESS NOTES
[facility] outreach     OB patient  - [place] - Followed by Dr. Saul Marino   * ED  - contractions  * IP NVD stay 11/10-. * Baby's name Ovidio. Care Transitions Initial Follow Up Call    Call within 2 business days of discharge: Yes - plans to be discharged tonight by 8:00pm.     Patient: Felicia Smalls Patient : 1985 MRN: [id number]    Last Discharge [address]       Complaint Diagnosis Description Type Department Provider    11/10/20   Admission (Current) [id number] Jerson Zaragoza MD            Challenges to be reviewed by the provider   Additional needs identified to be addressed with provider no  none    Discussed COVID-19 related testing which was available at this time. Test results were negative. Patient informed of results, if available? yes        Method of communication with provider : none    Advance Care Planning:   Does patient have an Advance Directive: patient declined education     Inpatient Readmission Risk score: Unplanned Readmit Risk Score: 6    Was this a readmission? no   Patient stated reason for the admission:  L&D third baby. Patients top risk factors for readmission: Risk of infection no known risk factors  Interventions to address risk factors: N/A    Care Transition Nurse (CTN) contacted the family by telephone to perform post hospital discharge assessment. Verified name and  with Mr. Malia Mera- baby's father as identifiers. Provided introduction to self, and explanation of the CTN role. CTN reviewed discharge instructions, medical action plan and red flags with family who verbalized understanding. Were discharge instructions available to patient? Not generated yet. Reviewed appropriate site of care based on symptoms and resources available to patient including: Benefits related nurse triage line, When to call 911 and OB on call line. Family given an opportunity to ask questions and does not have any further questions or concerns at this time.  The family agrees to contact the PCP office for questions related to their healthcare. Medication reconciliation was performed with family, who verbalizes understanding of administration of home medications. Advised obtaining a 90-day supply of all daily and as-needed medications. Referral to Pharm D needed: no     Home Health/Outpatient orders at discharge: none    Durable Medical Equipment ordered at discharge: None      Covid Risk Education    Patient has following risk factors of: no known risk factors. Education provided regarding infection prevention, and signs and symptoms of COVID-19 and when to seek medical attention with family who verbalized understanding. Discussed exposure protocols and quarantine From CDC: Are you at higher risk for severe illness?  and given an opportunity for questions and concerns. The family agrees to contact the COVID-19 hotline 867-743-1998 or PCP office for questions related to COVID-19. For more information on steps you can take to protect yourself, see CDC's How to Protect Yourself     Patient/family/caregiver given information for GetWell Loop and agrees to enroll no  Patient's preferred e-mail: declines  Patient's preferred phone number: declines    Discussed follow-up appointments. If no appointment was previously scheduled, appointment scheduling offered: Patient will make when home Is follow up appointment scheduled within 7 days of discharge? OB standard 6 week FU expected for [facility] follow up appointment(s): No future appointments. Plan for follow-up call in 1-2 days based on severity of symptoms and risk factors. Plan for next call: self management-baby / mom assessment- breast feeding update  CTN provided contact information for future needs. Goals Addressed                 This Visit's Progress     Prevent complications post hospitalization.         OB patient  - [place] - Followed by Dr. Nico Schaffer   * ED  - contractions  * IP NVD stay 11/10-.     - Discharged planned for 8:00pm tonight. NVD- breast feeding. Chart Review:   L&D NVD     Admit Date: 11/10/2020     Discharge Date: 2020      Admitting Physician: Hinda Leyden, MD      Attending Physician:  Herb Hernandez MD      Admission Diagnoses: Labor without complication [V05]     Discharge Diagnoses:   Information for the patient's :  Ann Grant [[id number]]   Delivery of a 7 lb 6.5 oz (3.36 kg) male infant via Vaginal, Spontaneous on 2020 at 7:27 AM  by Hinda Leyden. Apgars were 8  and 9 . This note was copied from a baby's chart. Initial Lactation Consultation: Infant born yesterday morning to a  mom at 40 4/7 weeks gestation. Mom nursed her other 2 children. Per mom, this infant has been latching well and cluster feeding. (Infant not seen at breast at this visit) I have suggested that she listen for swallows while infant nursing. Discussed positioning of infant to ensure a deep latch. Mom expresses confidence in nursing and will call for assistance as needed. Infant voiding and stooling adequately and weight loss at 24 hours was 5.2%.

## 2020-11-12 NOTE — LACTATION NOTE
This note was copied from a baby's chart. Initial Lactation Consultation: Infant born yesterday morning to a  mom at 40 4/7 weeks gestation. Mom nursed her other 2 children. Per mom, this infant has been latching well and cluster feeding. (Infant not seen at breast at this visit) I have suggested that she listen for swallows while infant nursing. Discussed positioning of infant to ensure a deep latch. Mom expresses confidence in nursing and will call for assistance as needed. Infant voiding and stooling adequately and weight loss at 24 hours was 5.2%.

## 2020-11-12 NOTE — ROUTINE PROCESS
Bedside shift change report given to JAHAIRA Guevara (oncoming nurse) by Nettie Multani RN (offgoing nurse). Report included the following information SBAR.     1910-Pt discharged home with family. Discharge instructions and medication times reviewed. Obtained signature on paper due to signature pad not working. Family verbalized understanding.

## 2020-11-12 NOTE — PROGRESS NOTES
Bedside shift change report given to MIGUEL Duncan RN (oncoming nurse) by SERA Celaya RN (offgoing nurse). Report included the following information SBAR.

## 2020-11-12 NOTE — DISCHARGE INSTRUCTIONS

## 2020-11-12 NOTE — DISCHARGE SUMMARY
Obstetrical Discharge Summary     Name: Aidan Ly MRN: [id number]  SSN: [id number]    YOB: 1985  Age: 30 y.o. Sex: female      Admit Date: 11/10/2020    Discharge Date: 2020     Admitting Physician: Jose Lopez MD     Attending Physician:  Snehal Ha MD     Admission Diagnoses: Labor without complication [W62]    Discharge Diagnoses:   Information for the patient's :  Maynor Art [[id number]]   Delivery of a 7 lb 6.5 oz (3.36 kg) male infant via Vaginal, Spontaneous on 2020 at 0:19 AM  by Jose Lopez. Apgars were 8  and 9 . Additional Diagnoses:   Hospital Problems  Date Reviewed: 2020          Codes Class Noted POA    Labor without complication [id number]-WW: A47  ICD-9-CM: 326  2020 Unknown             Lab Results   Component Value Date/Time    Rubella, External Immune 2020    GrBStrep, External Positive 10/14/2020       Hospital Course: Normal hospital course following the delivery. Disposition at Discharge: Home or self care    Discharged Condition: Stable    Patient Instructions:   Current Discharge Medication List      CONTINUE these medications which have NOT CHANGED    Details   PNV GT.88/VZTIXCH fum/folic ac (PRENATAL PO) Take  by mouth. Reference my discharge instructions.     Follow-up Appointments   Procedures    FOLLOW UP VISIT Appointment in: 6 Weeks     Standing Status:   Standing     Number of Occurrences:   1     Order Specific Question:   Appointment in     Answer:   6 Weeks        Signed By:  Jose Lopez MD     2020

## 2020-11-13 ENCOUNTER — PATIENT OUTREACH (OUTPATIENT)
Dept: OTHER | Age: 35
End: 2020-11-13

## 2020-11-13 NOTE — PROGRESS NOTES
PANKAJ FU call    OB patient  - [place] - Followed by Dr. Dianne Noyola   * ED  - contractions  * IP NVD stay 11/10-. * [de-identified] name Ovidio.     1:30pm  Attempted to contact patient for transitions of care- Maternity Mom/Baby. Unable to leave message on voicemail \"Mailbox full. \"  Will attempt to contact again. 1:50pm  Incoming call from Mr. CONTRERAS- father of baby. Verified  and address for HIPAA security. * He reports that everything is going well at home. - two toddlers are excited. - Sleep little as expected, but no concerns. * Baby's bilirubin check will be tomorrow at pediatric practice   - Travel time 20 minutes. - New infant intake by pediatrician is Monday. - CM reviews that elevated bili causes poor eating/ \"lazy eater\" and reviews Red Flags for weight loss/ dehydration (note wet diapers/ more yellow skin)   * Mom is doing well. - CM offers to speak to her directly. Father reports that she is in the bathroom. - CM shares that if there are any problems (heavy discharge/bleeding, fever, constipation, pelvic floor weakness or pain- ect) to call back. Additional needs identified to be addressed with provider no  none  Discussed COVID-19 related testing which was available at this time. Test results were negative. Patient informed of results, if available? Already aware. Method of communication with provider : none    Care Transition Nurse (CTN) contacted the family by telephone to follow up after admission -  NVD stay 11/10-. . Verified name and  with family as identifiers. Addressed changes since last contact:  Home safe and sound. Discharge needs reviewed: none None  Follow up appointment completed? For tomorrow/ bili check for baby 6 wk OB apt not made yet Was follow up appointment scheduled within 7 days of discharge? Baby Yes/   Mom no- not standard of care for NVD.      Advance Care Planning:   Does patient have an Advance Directive:  not on file     CTN reviewed discharge instructions, medical action plan and red flags with family and discussed any barriers to care and/or understanding of plan of care after discharge. Discussed appropriate site of care based on symptoms and resources available to patient including: Specialist, After hours contact number-They have the pediatrician call line. and Benefits related nurse triage line. The family agrees to contact the PCP/ OB/ Peds office for questions related to their healthcare. Patients top risk factors for readmission: Third pregnancy;  baby with elevated Bilirubin no known risk factors  Interventions to address risk factors: Reviewed Red Flags/ offered CM if needed       Plan for follow-up call in 10-14 days based on severity of symptoms and risk factors. Plan for next call: Update on baby's bilirubin;  Mom recovering well? CTN provided contact information for future needs. Goals Addressed                 This Visit's Progress     Prevent complications post hospitalization. OB patient  - [place] - Followed by Dr. Hernesto Rooney   * ED  - contractions  * IP NVD stay 11/10-.     - Discharged planned for 8:00pm tonight. NVD- breast feeding. -  Home doing well. [de-identified] bili is up/  for recheck tomorrow and new baby check Monday.    CM reviews that elevated bili causes poor eating/ \"lazy eater\" and reviews Red Flags for weight loss/ dehydration (note wet diapers/ more yellow skin)

## 2020-11-13 NOTE — PROGRESS NOTES
Bedside shift change report given to Abisai Winkler RN (oncoming nurse) by Ralph Wheeler RN (offgoing nurse). Report included the following information SBAR, Kardex, Intake/Output and MAR.

## 2020-11-17 RX ORDER — FLUCONAZOLE 150 MG/1
150 TABLET ORAL
Qty: 2 TAB | Refills: 0 | Status: SHIPPED | OUTPATIENT
Start: 2020-11-17 | End: 2020-11-21

## 2020-11-20 ENCOUNTER — PATIENT OUTREACH (OUTPATIENT)
Dept: OTHER | Age: 35
End: 2020-11-20

## 2020-11-20 NOTE — PROGRESS NOTES
CM- PANKAJ  follow up call. OB patient  - [place] - Followed by Dr. Chaya Diop   * ED  - contractions  * IP NVD stay 11/10-. * Baby's name Ovidio.     Contacted  patient for CM follow up services. Verified  and address for HIPAA security. * Bili has dropped - monitored by pediatrician. - Cord has dried, but not dropped. - No s/sx of infection. * Toddlers are doing well - busy and inquizative about the baby. * Sees a clinical psychologists - having virtual visits. * No medications for the baby?   - feeding - breast feeding/ milk has come in.      - sleep pattern-  2-3 hours/  Stays awake through feedings. '      MOM    * Concerns during post partum period  episiotmy - no   - doing well - appreciates the call  * Family support at home? Dad taking FMLA with her. * Still bleeding/  -  Flow/color of discharge has started to show improvement.   - No abd tenderness/  No dysuria. - Reviewed no intercourse or tampons until after FU with OB/GYN. Explained placenta healing and risk for infection- endometrial longterm problems. * Breast feeding/ Not breastfeeding:    Breasts are not hard or tender.    - Third baby- comfortable with breast care. - need resources for breast feeding? No  * Sleeping?        - Try to get at least 4 hours sleep at a time. - Mostly 2-3 hours at this point. Resting as much as she can. * Reminded her that her health is very connected to baby's health. - Diet/ outside walks/ deep breathing/  Shower/ clean clothes. - As much as she cares for baby/ care for herself. - While dad is home, take a walk by herself    - She is aware that she does better with outside activity. * Discussed Post partum depression. Normalized as very common/ dangerous   - Offered my support and report sadness/ depression to her mother and doctor     Any needed resources? None needed. * COVID precautions - staying safe.   No outings unless going to doctor's office. Will follow for CM services. Next planned outreach: Mon 12/14 - close PANKAJ if no new concerns.

## 2020-12-14 ENCOUNTER — PATIENT OUTREACH (OUTPATIENT)
Dept: OTHER | Age: 35
End: 2020-12-14

## 2020-12-14 NOTE — PROGRESS NOTES
PANKAJ  Wrap Up    OB patient  - [place] - Followed by Dr. Katheran Lesches   * ED  - contractions  * IP NVD stay 11/10-. * Baby's name Ovidio    Resolving current episode (Transitions of care complete). No further ED/UC or hospital admissions within 30 days post discharge. Patient attended follow-up appointments as directed. Final contact patient for transitions of care. Verified  and address for HIPAA security. * Ms. Erik Landau reports that she and the baby are doing well. * PP apt for next week. * Baby is growing and feeding well. * Finding a new normal with family. Going well. * Discussed further CM/  The patient identifies no further need for CM, but will keep my number and is open to [facility] RODRIGUEZ in the future if ED and inpt stays occur.

## 2020-12-24 ENCOUNTER — OFFICE VISIT (OUTPATIENT)
Dept: OBGYN CLINIC | Age: 35
End: 2020-12-24
Payer: COMMERCIAL

## 2020-12-24 VITALS — WEIGHT: 111 LBS | BODY MASS INDEX: 19.66 KG/M2 | DIASTOLIC BLOOD PRESSURE: 64 MMHG | SYSTOLIC BLOOD PRESSURE: 108 MMHG

## 2020-12-24 DIAGNOSIS — N61.0 MASTITIS: Primary | ICD-10-CM

## 2020-12-24 PROCEDURE — 99213 OFFICE O/P EST LOW 20 MIN: CPT | Performed by: OBSTETRICS & GYNECOLOGY

## 2020-12-24 RX ORDER — CLINDAMYCIN HYDROCHLORIDE 300 MG/1
CAPSULE ORAL
COMMUNITY
Start: 2020-12-19 | End: 2021-08-02

## 2020-12-24 NOTE — PATIENT INSTRUCTIONS

## 2020-12-24 NOTE — PROGRESS NOTES
Problem Visit    Gera Schneider is a 30 y.o.  presenting for problem visit. Her main concern today is a left breast lump. Patient states she is on her second round of antibiotics. Her last son delivered on .  each baby. She did a 10 day course of antibiotics for mastitis about 3 weeks ago. Prescribed by [employer]  doctor. Then, her infection returned 5 days ago. She was prescribed antibiotics again (a different one) by the [facility] provider. Since last night, she reports a left breast lump and a worsening of her pain. She feels like she was able to partially able to work out the lump. Denies a fever currently. Ob/Gyn Hx:    -  LMP- breastfeeding.   Menses-   Contraception-  SA-      Past Medical History:   Diagnosis Date    Anemia     During pregnancy, taking Iron    Kidney stone         Routine Papanicolaou smear 2016    Negative (no hpv) from [address], blistering        Past Surgical History:   Procedure Laterality Date    HX CHOLECYSTECTOMY  2010    HX OTHER SURGICAL      cholecystectomy    HX WISDOM TEETH EXTRACTION         Family History   Problem Relation Age of Onset    Thyroid Disease Mother     Cancer Father         Prostate     Thyroid Disease Maternal Grandmother     Stroke Maternal Grandmother     Thyroid Disease Other         6 Siblings of Mother h/o Hyperthyroidism        Social History     Socioeconomic History    Marital status:      Spouse name: Not on file    Number of children: Not on file    Years of education: Not on file    Highest education level: Not on file   Occupational History    Not on file   Social Needs    Financial resource strain: Not on file    Food insecurity     Worry: Not on file     Inability: Not on file    Transportation needs     Medical: Not on file     Non-medical: Not on file   Tobacco Use    Smoking status: Never Smoker    Smokeless tobacco: Never Used   Zadie Bud Tobacco comment: Never used vapor or e-cigs    Substance and Sexual Activity    Alcohol use: No    Drug use: No    Sexual activity: Yes     Partners: Male     Birth control/protection: None   Lifestyle    Physical activity     Days per week: Not on file     Minutes per session: Not on file    Stress: Not on file   Relationships    Social connections     Talks on phone: Not on file     Gets together: Not on file     Attends Mosque service: Not on file     Active member of club or organization: Not on file     Attends meetings of clubs or organizations: Not on file     Relationship status: Not on file    Intimate partner violence     Fear of current or ex partner: Not on file     Emotionally abused: Not on file     Physically abused: Not on file     Forced sexual activity: Not on file   Other Topics Concern     Service Not Asked    Blood Transfusions Not Asked    Caffeine Concern Not Asked    Occupational Exposure Not Asked   [place] Hazards Not Asked    Sleep Concern Not Asked    Stress Concern Not Asked    Weight Concern Not Asked    Special Diet Not Asked    Back Care Not Asked    Exercise Not Asked    Bike Helmet Not Asked   [address] Not Asked    Self-Exams Not Asked   Social History Narrative    Not on file       Current Outpatient Medications   Medication Sig Dispense Refill    clindamycin (CLEOCIN) 300 mg capsule       PNV ZO.67/UAPHTWY fum/folic ac (PRENATAL PO) Take  by mouth.          Allergies   Allergen Reactions    Amoxicillin Rash    Tetanus Vaccines And Toxoid Other (comments)     Fever       Review of Systems - History obtained from the patient  Constitutional: negative for weight loss, fever, night sweats  HEENT: negative for hearing loss, earache, congestion, snoring, sorethroat  CV: negative for chest pain, palpitations, edema  Resp: negative for cough, shortness of breath, wheezing  GI: negative for change in bowel habits, abdominal pain, black or bloody stools  : negative for frequency, dysuria, hematuria, vaginal discharge  MSK: negative for back pain, joint pain, muscle pain  Breast: negative for breast lumps, nipple discharge, galactorrhea  Skin :negative for itching, rash, hives  Neuro: negative for dizziness, headache, confusion, weakness  Psych: negative for anxiety, depression, change in mood  Heme/lymph: negative for bleeding, bruising, pallor    Physical Exam    Visit Vitals  /64 (BP 1 Location: Left arm, BP Patient Position: Sitting)   Wt 111 lb (50.3 kg)   Breastfeeding Yes   BMI 19.66 kg/m²         OBGyn Exam      Constitutional  · Appearance: well-nourished, well developed, alert, in no acute distress    HENT  · Head and Face: appears normal    Neck  · Inspection/Palpation: normal appearance, no masses or tenderness  · Thyroid: gland size normal, nontender    Chest  · Respiratory Effort: non-labored breathing    Breasts: b/l breast c/w lactation changes. Left breast with no palpable lump or mass. Dense segment of breast tissue palpable around 2 o'clock. Minimally tender. No overlying erythema or warmth. Right breast without erythema or tenderness. No axillary or supraclavicular lymphadenopathy. Cardiovascular  · Extremities: no peripheral edema    Gastrointestinal  · Abdominal Examination: abdomen non-distended, non-tender to palpation, no masses present  · Liver and spleen: no hepatomegaly present, spleen not palpable  · Hernias: no hernias identified    Skin  · General Inspection: no rash, no lesions identified    Neurologic/Psychiatric  · Mental Status:  · Orientation: grossly oriented to person, place and time  · Mood and Affect: mood normal, affect appropriate      Assessment/Plan:    1. Mastitis  Discussed her clinical course and her diagnosis of mastitis twice already since delivery. Reassured that on exam, she does not appear to have a clogged duct or breast abscess, or obvious sign of residual infection.   Discussed meeting with lactation specialist to help manage her supply. Right now she is overproducing and pumping after every feed. Discussed hydration, pineapple juice, and lecithin.        Divina Velasco MD

## 2021-02-05 ENCOUNTER — TELEPHONE (OUTPATIENT)
Dept: OBGYN CLINIC | Age: 36
End: 2021-02-05

## 2021-02-05 NOTE — TELEPHONE ENCOUNTER
Call received at 3:45PM      30year old patient delivered on 2020 by bethany      Patient calling about scheduling a late PP visit. Patient placed on the schedule to be seen on 2/15/2021 at 3:10PM      Patient verbalized understanding.

## 2021-02-15 ENCOUNTER — OFFICE VISIT (OUTPATIENT)
Dept: OBGYN CLINIC | Age: 36
End: 2021-02-15
Payer: COMMERCIAL

## 2021-02-15 VITALS
BODY MASS INDEX: 19.84 KG/M2 | SYSTOLIC BLOOD PRESSURE: 115 MMHG | WEIGHT: 112 LBS | DIASTOLIC BLOOD PRESSURE: 65 MMHG | HEIGHT: 63 IN

## 2021-02-15 DIAGNOSIS — Z11.51 SPECIAL SCREENING EXAMINATION FOR HUMAN PAPILLOMAVIRUS (HPV): ICD-10-CM

## 2021-02-15 DIAGNOSIS — Z01.419 WELL WOMAN EXAM: Primary | ICD-10-CM

## 2021-02-15 PROBLEM — Z34.80 SUPERVISION OF OTHER NORMAL PREGNANCY: Status: RESOLVED | Noted: 2020-04-28 | Resolved: 2021-02-15

## 2021-02-15 PROCEDURE — 0502F SUBSEQUENT PRENATAL CARE: CPT | Performed by: OBSTETRICS & GYNECOLOGY

## 2021-02-15 NOTE — PROGRESS NOTES
Postpartum evaluation    Lisseth Centeno is a 30 y.o. female who presents for a postpartum exam.       She is now 13.5 weeks post normal spontaneous vaginal delivery. Her baby is doing well. She has had no menses since delivery. She has had the following significant problems since her delivery: none    The patient is breast feeding without difficulty. The patient would like to use nothing for birth control. She is currently taking: no medications. She is due for her next AE in today months.      Visit Vitals  Wt 112 lb (50.8 kg)   Breastfeeding Yes   BMI 19.84 kg/m²       PHYSICAL EXAMINATION    Constitutional  · Appearance: well-nourished, well developed, alert, in no acute distress    HENT  · Head and Face: appears normal    Gastrointestinal  · Abdominal Examination: abdomen non-tender to palpation, normal bowel sounds, no masses present  · Liver and spleen: no hepatomegaly present, spleen not palpable  · Hernias: no hernias identified    Genitourinary  · External Genitalia: normal appearance for age, no discharge present, no tenderness present, no inflammatory lesions present, no masses present, no atrophy present  · Vagina: normal vaginal vault; 2cm left posterior wall vaginal cyst; nontender , no discharge present, no inflammatory lesions present, no masses present  · Bladder: non-tender to palpation  · Urethra: appears normal  · Cervix: normal   · Uterus: normal size, shape and consistency  · Adnexa: no adnexal tenderness present, no adnexal masses present  · Perineum: perineum within normal limits, no evidence of trauma, no rashes or skin lesions present  · Anus: anus within normal limits, no hemorrhoids present  · Inguinal Lymph Nodes: no lymphadenopathy present    Skin  · General Inspection: no rash, no lesions  Neurologic/Psychiatric  · Mental Status:  · Orientation: grossly oriented to person, place and time  · Mood and Affect: mood normal, affect appropriate    Assessment:  Normal postpartum check    Plan:  Patient declines presence of chaperone during today's visit.    RTO for AE

## 2021-02-15 NOTE — PATIENT INSTRUCTIONS
Nutrition for Breastfeeding Mothers: Care Instructions  Your Care Instructions     If you are breastfeeding, your doctor may suggest that you eat more calories each day than otherwise recommended for a person of your height and weight. Breastfeeding helps build the bond between you and your baby. It gives your baby excellent health benefits. A healthy diet includes eating a variety of foods from the basic food groups: grains, vegetables, fruits, milk and milk products (such as cheese and yogurt), and meat and dried beans. Eating well during breastfeeding will ensure that you stay healthy. Follow-up care is a key part of your treatment and safety. Be sure to make and go to all appointments, and call your doctor if you are having problems. It's also a good idea to know your test results and keep a list of the medicines you take. How can you care for yourself at home? Making good choices about what you eat and drink when you are breastfeeding can help you stay healthy. It can also help your baby stay healthy. Here are some things you can do. Eat a variety of healthy foods. This includes vegetables, fruits, milk products, whole grains, and protein. Drink plenty of fluids. Make water your first choice. People who drink enough fluids usually have urine that is light yellow to clear. If you have kidney, heart, or liver disease and have to limit fluids, talk with your doctor before you increase your fluids. Avoid fish with high mercury. This includes shark, swordfish, milana mackerel, and marlin. It also includes orange roughy, bigeye tuna, and tilefish from the [facility]. Instead, eat fish that is low in mercury. Choose canned light tuna, salmon, pollock, catfish, or shrimp. Limit caffeine. Things like coffee, tea, chocolate, and some sodas can contain caffeine. Caffeine can pass through breast milk to your baby. It may cause fussiness and sleep problems.  Talk to your doctor about how much caffeine is safe for you. Limit alcohol. Alcohol can pass through breast milk to your baby. Talk to your doctor if you have questions about drinking alcohol while breastfeeding. Be safe with supplements. Talk with your doctor before taking any vitamins, minerals, and herbal or other dietary supplements. When should you call for help? Watch closely for changes in your health, and be sure to contact your doctor if you have any problems. Where can you learn more? Go to http://www.gray.com/  Enter P234 in the search box to learn more about \"Nutrition for Breastfeeding Mothers: Care Instructions. \"  Current as of: February 11, 2020               Content Version: 12.6  © 6502-6840 [employer]. Care instructions adapted under license by [employer] (which disclaims liability or warranty for this information). If you have questions about a medical condition or this instruction, always ask your healthcare professional. [address] any warranty or liability for your use of this information.

## 2021-02-19 LAB
CYTOLOGIST CVX/VAG CYTO: NORMAL
CYTOLOGY CVX/VAG DOC CYTO: NORMAL
CYTOLOGY CVX/VAG DOC THIN PREP: NORMAL
DX ICD CODE: NORMAL
LABCORP, 190119: NORMAL
Lab: NORMAL
OTHER STN SPEC: NORMAL
STAT OF ADQ CVX/VAG CYTO-IMP: NORMAL

## 2021-03-01 ENCOUNTER — PATIENT MESSAGE (OUTPATIENT)
Dept: INTERNAL MEDICINE CLINIC | Age: 36
End: 2021-03-01

## 2021-03-02 ENCOUNTER — HOSPITAL ENCOUNTER (OUTPATIENT)
Dept: GENERAL RADIOLOGY | Age: 36
Discharge: HOME | End: 2021-03-02
Payer: COMMERCIAL

## 2021-03-02 DIAGNOSIS — J34.89 NASAL PAIN: ICD-10-CM

## 2021-03-02 DIAGNOSIS — J34.89 NASAL PAIN: Primary | ICD-10-CM

## 2021-03-02 PROCEDURE — 70160 X-RAY EXAM OF NASAL BONES: CPT

## 2021-03-03 NOTE — PROGRESS NOTES
Message sent to patient by My Chart:  The X-ray of your nose showed no fracture. I recommend you take ibuprofen or Tylenol as needed for pain and monitor the pain for another week. If you are still having pain, then the next step would be see an ENT doctor.     Dr. Bhavik Boss

## 2021-03-11 DIAGNOSIS — J34.89 NASAL PAIN: Primary | ICD-10-CM

## 2021-03-11 NOTE — TELEPHONE ENCOUNTER
I called the patient and verified them by name and date of birth per Dr. Dieter Moralez. I informed them on the referral information. They stated understanding and had no further questions.

## 2021-03-11 NOTE — TELEPHONE ENCOUNTER
----- Message from All Velasquez LPN sent at 4/3/6746  2:37 PM EST -----  Regarding: FW: Non-Urgent Medical Question  Contact: [phone]    ----- Message -----  From: [place] Ann  Sent: 3/9/2021   2:30 PM EST  To: [facility] Nurses  Subject: RE: Non-Urgent Zerita Sale Dr. Kristyn Muhammad,   Thank you again for your help regarding my nose issues. At this point, I am still experiencing pain whenever I touch/rub/bump my nose even with pain medication. The pain is lower in my nose. It seems to be where the septum meets with the cartilage. Would it be possible to schedule an appointment with an ENT to determine if there is a way to address the pain? Thanks very much.

## 2021-03-11 NOTE — TELEPHONE ENCOUNTER
Please call patient to give her information on ENT Referral.  You can also mail referral form (printed at back printer) to her.

## 2021-08-02 ENCOUNTER — OFFICE VISIT (OUTPATIENT)
Dept: URGENT CARE | Age: 36
End: 2021-08-02
Payer: COMMERCIAL

## 2021-08-02 VITALS — RESPIRATION RATE: 16 BRPM | OXYGEN SATURATION: 97 % | TEMPERATURE: 97.7 F | HEART RATE: 77 BPM

## 2021-08-02 DIAGNOSIS — Z20.822 ENCOUNTER FOR LABORATORY TESTING FOR COVID-19 VIRUS: Primary | ICD-10-CM

## 2021-08-02 PROCEDURE — S9083 URGENT CARE CENTER GLOBAL: HCPCS | Performed by: EMERGENCY MEDICINE

## 2021-08-02 NOTE — PROGRESS NOTES
Pt here with possible secondary COVID exposures, son was exposed at  but no Sx. They are here for screening test. This patient was seen in [facility] Clinic at [address] [place] [address] Urgent Care while outdoors at their vehicle due to COVID-19 pandemic with PPE and focused examination in order to decrease community viral transmission      The history is provided by the patient. Past Medical History:   Diagnosis Date    Anemia     During pregnancy, taking Iron    Kidney stone     2015    Routine Papanicolaou smear 07/20/2016    Negative (no hpv) from [address], blistering         Past Surgical History:   Procedure Laterality Date    HX CHOLECYSTECTOMY  07/2010    HX OTHER SURGICAL  2011    cholecystectomy    HX WISDOM TEETH EXTRACTION  2009         Family History   Problem Relation Age of Onset    Thyroid Disease Mother     Cancer Father         Prostate     Thyroid Disease Maternal Grandmother     Stroke Maternal Grandmother     Thyroid Disease Other         6 Siblings of Mother h/o Hyperthyroidism         Social History     Socioeconomic History    Marital status:      Spouse name: Not on file    Number of children: Not on file    Years of education: Not on file    Highest education level: Not on file   Occupational History    Not on file   Tobacco Use    Smoking status: Never Smoker    Smokeless tobacco: Never Used    Tobacco comment: Never used vapor or e-cigs    Substance and Sexual Activity    Alcohol use:  Yes     Alcohol/week: 4.0 standard drinks     Types: 4 Glasses of wine per week    Drug use: No    Sexual activity: Not Currently     Partners: Male     Birth control/protection: None   Other Topics Concern     Service Not Asked    Blood Transfusions Not Asked    Caffeine Concern Not Asked    Occupational Exposure Not Asked    Hobby Hazards Not Asked    Sleep Concern Not Asked    Stress Concern Not Asked    Weight Concern Not Asked    Special Diet Not Asked    Back Care Not Asked    Exercise Not Asked    Bike Helmet Not Asked   [address] Not Asked    Self-Exams Not Asked   Social History Narrative    Not on file     Social Determinants of Health     Financial Resource Strain:     Difficulty of Paying Living Expenses:    Food Insecurity:     Worried About Running Out of Food in the Last Year:     Ran Out of Food in the Last Year:    Transportation Needs:     Lack of Transportation (Medical):  Lack of Transportation (Non-Medical):    Physical Activity:     Days of Exercise per Week:     Minutes of Exercise per Session:    Stress:     Feeling of Stress :    Social Connections:     Frequency of Communication with Friends and Family:     Frequency of Social Gatherings with Friends and Family:     Attends [employer] Services:     Active Member of Clubs or Organizations:     Attends Club or Organization Meetings:     Marital Status:    Intimate Partner Violence:     Fear of Current or Ex-Partner:     Emotionally Abused:     Physically Abused:     Sexually Abused: ALLERGIES: Amoxicillin and Tetanus vaccines and toxoid    Review of Systems   Constitutional: Negative for chills, diaphoresis, fatigue and fever. HENT: Negative for congestion, ear pain, sinus pressure, sneezing, sore throat and trouble swallowing. Eyes: Negative for redness. Respiratory: Negative for cough and shortness of breath. Cardiovascular: Negative for chest pain. Gastrointestinal: Negative for abdominal pain, diarrhea, nausea and vomiting. Musculoskeletal: Negative for arthralgias and myalgias. Skin: Negative for rash. Neurological: Negative for headaches. Vitals:    08/02/21 1201   Pulse: 77   Resp: 16   Temp: 97.7 °F (36.5 °C)   SpO2: 97%       Physical Exam  Vitals and nursing note reviewed. Constitutional:       General: She is not in acute distress. Appearance: Normal appearance. She is normal weight.  She is not ill-appearing, toxic-appearing or diaphoretic. HENT:      Nose: No rhinorrhea. Mouth/Throat:      Mouth: Mucous membranes are moist.      Pharynx: Oropharynx is clear. No oropharyngeal exudate or posterior oropharyngeal erythema. Cardiovascular:      Rate and Rhythm: Normal rate and regular rhythm. Pulses: Normal pulses. Heart sounds: Normal heart sounds. Pulmonary:      Effort: Pulmonary effort is normal. No respiratory distress. Breath sounds: Normal breath sounds. No stridor. No wheezing, rhonchi or rales. Musculoskeletal:      Cervical back: Normal range of motion and neck supple. No rigidity. No muscular tenderness. Lymphadenopathy:      Cervical: No cervical adenopathy. Neurological:      Mental Status: She is alert and oriented to person, place, and time. Psychiatric:         Mood and Affect: Mood normal.         Behavior: Behavior normal.         [facility]    ICD-10-CM ICD-9-CM    1. Encounter for laboratory testing for COVID-19 virus  Z20.822 V01.79 NOVEL CORONAVIRUS (COVID-19)     No orders of the defined types were placed in this encounter. The patient's condition and possible alternative diagnoses were discussed with the patient and they verbalized understanding. The patient is to follow up with their primary care doctor for continued care. If signs and symptoms persist or become worse or new symptoms develop, the pt is to go immediately to the emergency department. Any new medications that may have been written for should be taken as directed but should always be discussed with the primary care physician and pharmacist. This was communicated to the patient. Pt instructed to quarantine until COVID testing results are back and then duration of quarantine will depend on result, current recommendations and symptoms. The patient is to get immediate re-evaluation for any new or worsening symptoms. They are to quarantine from other household members.  It was recommended they stay hydrated and practice deep breathing exercises.          Procedures

## 2021-08-04 LAB
SARS-COV-2, NAA 2 DAY TAT: NORMAL
SARS-COV-2, NAA: NOT DETECTED

## 2021-08-04 NOTE — PROGRESS NOTES
Spoke with pt. On 8/4/2021 2506 [facility] notified of negative covid-19 result.  No concerns expressed at that time

## 2022-01-05 NOTE — PROGRESS NOTES
Attempted to reach patient to discuss lab results and complete a hospital follow up call.  No answer.   Chief Complaint   Patient presents with    Skin Exam     1. Have you been to the ER, urgent care clinic since your last visit? Hospitalized since your last visit? Was seen at [facility] for labor and delivery 3 months ago. 2. Have you seen or consulted any other health care providers outside of the [address] [place] [address] since your last visit? Include any pap smears or colon screening.  No

## 2022-03-19 PROBLEM — K42.9 UMBILICAL HERNIA WITHOUT OBSTRUCTION AND WITHOUT GANGRENE: Status: ACTIVE | Noted: 2019-01-09

## 2022-05-11 NOTE — PROGRESS NOTES
Emmanuel Mcconnell is a 29y.o. year old female seen in clinic today for   Chief Complaint   Patient presents with    Form Completion    Rash       she is here today to get TB testing for work and evaluate new rash to back. HAD TDAP in 2019. Requesting proof for employer. TB screen: No cough, no hemoptysis, no night sweats. Rash to back x a few days. Current Outpatient Medications on File Prior to Visit   Medication Sig Dispense Refill    omega 3-dha-epa-fish oil (Fish OiL) 100-160-1,000 mg cap Take  by mouth daily.  PNV EX.65/WXQVFAG fum/folic ac (PRENATAL PO) Take  by mouth. No current facility-administered medications on file prior to visit. Allergies   Allergen Reactions    Amoxicillin Rash    Tetanus Vaccines And Toxoid Other (comments)     Fever     Past Medical History:   Diagnosis Date    Anemia     During pregnancy, taking Iron    Kidney stone     2015    Routine Papanicolaou smear 07/20/2016    Negative (no hpv) from [address], blistering       Past Surgical History:   Procedure Laterality Date    HX CHOLECYSTECTOMY  07/2010    HX OTHER SURGICAL  2011    cholecystectomy    HX WISDOM TEETH EXTRACTION  2009        Family History   Problem Relation Age of Onset    Thyroid Disease Mother     Cancer Father         Prostate     Thyroid Disease Maternal Grandmother     Stroke Maternal Grandmother     Thyroid Disease Other         6 Siblings of Mother h/o Hyperthyroidism         Social History     Socioeconomic History    Marital status:      Spouse name: Not on file    Number of children: Not on file    Years of education: Not on file    Highest education level: Not on file   Occupational History    Not on file   Tobacco Use    Smoking status: Never Smoker    Smokeless tobacco: Never Used    Tobacco comment: Never used vapor or e-cigs    Vaping Use    Vaping Use: Never used   Substance and Sexual Activity    Alcohol use:  Yes     Alcohol/week: 4.0 standard drinks     Types: 4 Glasses of wine per week    Drug use: No    Sexual activity: Not Currently     Partners: Male     Birth control/protection: None   Other Topics Concern     Service Not Asked    Blood Transfusions Not Asked    Caffeine Concern Not Asked    Occupational Exposure Not Asked    Hobby Hazards Not Asked    Sleep Concern Not Asked    Stress Concern Not Asked    Weight Concern Not Asked    Special Diet Not Asked    Back Care Not Asked    Exercise Not Asked    Bike Helmet Not Asked   [address] Not Asked    Self-Exams Not Asked   Social History Narrative    Not on file     Social Determinants of Health     Financial Resource Strain:     Difficulty of Paying Living Expenses: Not on file   Food Insecurity:     Worried About Running Out of Food in the Last Year: Not on file    [place] of Food in the Last Year: Not on file   Transportation Needs:     Lack of Transportation (Medical): Not on file    Lack of Transportation (Non-Medical):  Not on file   Physical Activity:     Days of Exercise per Week: Not on file    Minutes of Exercise per Session: Not on file   Stress:     Feeling of Stress : Not on file   Social Connections:     Frequency of Communication with Friends and Family: Not on file    Frequency of Social Gatherings with Friends and Family: Not on file    Attends Adventism Services: Not on file    Active Member of [address] [place] [address] [employer] or Organizations: Not on file    Attends Club or Organization Meetings: Not on file    Marital Status: Not on file   Intimate Partner Violence:     Fear of Current or Ex-Partner: Not on file    Emotionally Abused: Not on file    Physically Abused: Not on file    Sexually Abused: Not on file   Housing Stability:     Unable to Pay for Housing in the Last Year: Not on file    Number of [place] in the Last Year: Not on file    Unstable Housing in the Last Year: Not on file           Visit Vitals  BP (!) 93/57 (BP 1 Location: Left upper arm, BP Patient Position: Sitting)   Pulse 68   Temp 97.6 °F (36.4 °C) (Oral)   Resp 12   Ht 5' 3\" (1.6 m)   Wt 104 lb (47.2 kg)   LMP 04/27/2022 (Approximate)   SpO2 100%   BMI 18.42 kg/m²       Review of Systems   Constitutional: Negative for diaphoresis. Respiratory: Negative for cough, hemoptysis and shortness of breath. Skin: Positive for rash. Physical Exam  Vitals and nursing note reviewed. Constitutional:       Appearance: Normal appearance. HENT:      Head: Normocephalic and atraumatic. Nose: Nose normal.      Mouth/Throat:      Mouth: Mucous membranes are moist.   Eyes:      Pupils: Pupils are equal, round, and reactive to light. Pulmonary:      Effort: Pulmonary effort is normal.   Musculoskeletal:      Cervical back: Normal range of motion and neck supple. Right lower leg: No edema. Left lower leg: No edema. Skin:     General: Skin is warm. Capillary Refill: Capillary refill takes 2 to 3 seconds. Findings: Rash (upper back with scattered macular papular hyperpigmentation regions with well circumscribed borders) present. Neurological:      General: No focal deficit present. Mental Status: She is alert and oriented to person, place, and time. Psychiatric:         Mood and Affect: Mood normal.         Behavior: Behavior normal.          No results found for this or any previous visit (from the past 24 hour(s)). ASSESSMENT AND PLAN  Diagnoses and all orders for this visit:    1. Tinea versicolor  -     clotrimazole-betamethasone (LOTRISONE) topical cream; Apply  to affected area two (2) times a day. 2. Screening-pulmonary TB  -     QUANTIFERON-TB GOLD PLUS    Vaccine report printed. TB test lab ordered. Rash appears to be tinea versicolor. Will treat with lotrisone topical cream BID. Return if not improved        I have discussed the diagnosis with the patient and the intended plan as seen in the above orders. Patient is in agreement. The patient has received an after-visit summary and questions were answered concerning future plans. I have discussed medication side effects and warnings with the patient as well.     Farnaz Persaud PA-C

## 2022-05-12 ENCOUNTER — OFFICE VISIT (OUTPATIENT)
Dept: INTERNAL MEDICINE CLINIC | Age: 37
End: 2022-05-12
Payer: COMMERCIAL

## 2022-05-12 VITALS
OXYGEN SATURATION: 100 % | SYSTOLIC BLOOD PRESSURE: 93 MMHG | BODY MASS INDEX: 18.43 KG/M2 | TEMPERATURE: 97.6 F | RESPIRATION RATE: 12 BRPM | HEIGHT: 63 IN | WEIGHT: 104 LBS | HEART RATE: 68 BPM | DIASTOLIC BLOOD PRESSURE: 57 MMHG

## 2022-05-12 DIAGNOSIS — B36.0 TINEA VERSICOLOR: Primary | ICD-10-CM

## 2022-05-12 DIAGNOSIS — Z11.1 SCREENING-PULMONARY TB: ICD-10-CM

## 2022-05-12 PROCEDURE — 99213 OFFICE O/P EST LOW 20 MIN: CPT | Performed by: PHYSICIAN ASSISTANT

## 2022-05-12 RX ORDER — CHOLECALCIFEROL (VITAMIN D3) 50 MCG
CAPSULE ORAL DAILY
COMMUNITY

## 2022-05-12 RX ORDER — CLOTRIMAZOLE AND BETAMETHASONE DIPROPIONATE 10; .64 MG/G; MG/G
CREAM TOPICAL 2 TIMES DAILY
Qty: 15 G | Refills: 0 | Status: SHIPPED | OUTPATIENT
Start: 2022-05-12

## 2022-05-12 NOTE — PATIENT INSTRUCTIONS
Betamethasone/Clotrimazole (On the skin)   Betamethasone Dipropionate (bay-ta-METH-a-sone dye-PROE-pee-oh-danielle), Clotrimazole (zdwc-UYVH-h-zole)  Treats fungus infections. This is a combination of a steroid and an antifungal medicine. Brand Name(s): DermacinRx Therazole Brien, Lotrisone   There may be other brand names for this medicine. When This Medicine Should Not Be Used: This medicine is not right for everyone. Do not use it if you had an allergic reaction to clotrimazole or betamethasone. How to Use This Medicine:   Cream, Lotion  · Your doctor will tell you how much medicine to use. Do not use more than directed. · Use this medicine only on your skin. Rinse it off right away if it gets on a cut or scrape. Do not get the medicine in your eyes, nose, or mouth. · Wash your hands with soap and water before and after you use this medicine. · Apply a thin layer of the medicine to the affected area. Rub it in gently. · Shake the bottle of lotion well just before use. · Do not cover, wrap, or wear tight fitting clothes over your treated skin areas unless directed by your doctor. Allow the medicine to dry before you put on your clothes. · It is very important that you keep using this medicine for the full time of treatment to clear up your skin problem completely. Do not miss any doses. · This medicine is not for long-term use. Do not use the cream to treat jock itch and ringworm for more than 2 weeks, and for athlete's foot for more than 4 weeks, unless your doctor has told you to. · Read and follow the patient instructions that come with this medicine. Talk to your doctor or pharmacist if you have any questions. · Missed dose: Apply a dose as soon as you can. If it is almost time for your next dose, wait until then and apply a regular dose. Do not apply extra medicine to make up for a missed dose.   · Store the medicine in a closed container at room temperature, away from heat, moisture, and direct light. Store the lotion bottle upright. Drugs and Foods to Avoid:   Ask your doctor or pharmacist before using any other medicine, including over-the-counter medicines, vitamins, and herbal products. · Some medicines can affect how betamethasone and clotrimazole combination works. Tell your doctor if you are using other steroids. Warnings While Using This Medicine:   · Tell your doctor if you are pregnant or breastfeeding, or if you have liver problems, diabetes, or a skin infection. · Do not use this medicine on anyone younger than 16years of age. · This medicine may cause adrenal gland problems, such as Cushing syndrome or high blood sugar. · Do not use this medicine to treat a skin problem your doctor has not examined. · If your symptoms do not improve after 1 or 2 weeks of treatment, or if they get worse, check with your doctor. · Keep all medicine out of the reach of children. Never share your medicine with anyone. Possible Side Effects While Using This Medicine:   Call your doctor right away if you notice any of these side effects:  · Allergic reaction: Itching or hives, swelling in your face or hands, swelling or tingling in your mouth or throat, chest tightness, trouble breathing  · Color changes on the skin, dark freckles, easy bruising, muscle weakness  · Round, puffy face  · Severe itching, burning, or skin irritation  · Signs of infection such as redness, swelling, drainage, or pus  · Weight gain around your neck, upper back, breast, face, or waist  If you notice these less serious side effects, talk with your doctor:   · Numbness, tingling, or stinging at the application site  If you notice other side effects that you think are caused by this medicine, tell your doctor. Call your doctor for medical advice about side effects.  You may report side effects to FDA at 7-632-FDA-6657  © 2017 [facility] Information is for End User's use only and may not be sold, redistributed or otherwise used for commercial purposes. The above information is an  only. It is not intended as medical advice for individual conditions or treatments. Talk to your doctor, nurse or pharmacist before following any medical regimen to see if it is safe and effective for you.

## 2022-05-12 NOTE — PROGRESS NOTES
Jose Talamantes  Identified pt with two pt identifiers(name and ). No chief complaint on file. Reviewed record In preparation for visit and have obtained necessary documentation. 1. Have you been to the ER, urgent care clinic or hospitalized since your last visit? No     2. Have you seen or consulted any other health care providers outside of the [address] [place] [address] since your last visit? Include any pap smears or colon screening. No    Vitals reviewed with provider. Health Maintenance reviewed:     Health Maintenance Due   Topic    Hepatitis C Screening     Depression Screen         Wt Readings from Last 3 Encounters:   02/15/21 112 lb (50.8 kg)   20 111 lb (50.3 kg)   11/10/20 127 lb 3.2 oz (57.7 kg)      Temp Readings from Last 3 Encounters:   21 97.7 °F (36.5 °C)   20 (P) 97.8 °F (36.6 °C)   20 97.7 °F (36.5 °C)      BP Readings from Last 3 Encounters:   02/15/21 115/65   20 108/64   20 (P) 104/70      Pulse Readings from Last 3 Encounters:   21 77   20 (P) 80   20 80      There were no vitals filed for this visit. Learning Assessment:   :     Learning Assessment 2019   PRIMARY LEARNER Patient Patient   HIGHEST LEVEL OF EDUCATION - PRIMARY LEARNER  > 4 YEARS OF COLLEGE > 4 YEARS OF COLLEGE   BARRIERS PRIMARY LEARNER NONE -   CO-LEARNER CAREGIVER No -   PRIMARY LANGUAGE ENGLISH ENGLISH   LEARNER PREFERENCE PRIMARY DEMONSTRATION DEMONSTRATION   ANSWERED BY patient patient    RELATIONSHIP SELF SELF        Depression Screening:   :     3 most recent PHQ Screens 3/18/2020   PHQ Not Done -   Little interest or pleasure in doing things Not at all   Feeling down, depressed, irritable, or hopeless Not at all   Total Score PHQ 2 0        Fall Risk Assessment:   :     No flowsheet data found. Abuse Screening:   :     Abuse Screening Questionnaire 2018   Do you ever feel afraid of your partner?  N   Are you in a relationship with someone who physically or mentally threatens you? N   Is it safe for you to go home?  Y        ADL Screening:   :     ADL Assessment 7/17/2019   Feeding yourself No Help Needed   Getting from bed to chair No Help Needed   Getting dressed No Help Needed   Bathing or showering No Help Needed   Walk across the room (includes cane/walker) No Help Needed   Using the telphone No Help Needed   Taking your medications No Help Needed   Preparing meals No Help Needed   Managing money (expenses/bills) No Help Needed   Moderately strenuous housework (laundry) No Help Needed   Shopping for personal items (toiletries/medicines) No Help Needed   Shopping for groceries No Help Needed   Driving No Help Needed   Climbing a flight of stairs No Help Needed   Getting to places beyond walking distances No Help Needed

## 2022-06-04 ENCOUNTER — OFFICE VISIT (OUTPATIENT)
Dept: URGENT CARE | Age: 37
End: 2022-06-04
Payer: COMMERCIAL

## 2022-06-04 VITALS
OXYGEN SATURATION: 97 % | HEIGHT: 63 IN | BODY MASS INDEX: 18.25 KG/M2 | WEIGHT: 103 LBS | DIASTOLIC BLOOD PRESSURE: 72 MMHG | RESPIRATION RATE: 18 BRPM | SYSTOLIC BLOOD PRESSURE: 106 MMHG | TEMPERATURE: 97.8 F | HEART RATE: 84 BPM

## 2022-06-04 DIAGNOSIS — J02.9 PHARYNGITIS, UNSPECIFIED ETIOLOGY: Primary | ICD-10-CM

## 2022-06-04 LAB
S PYO AG THROAT QL: NEGATIVE
VALID INTERNAL CONTROL?: YES

## 2022-06-04 PROCEDURE — 99213 OFFICE O/P EST LOW 20 MIN: CPT | Performed by: NURSE PRACTITIONER

## 2022-06-04 PROCEDURE — 87880 STREP A ASSAY W/OPTIC: CPT | Performed by: NURSE PRACTITIONER

## 2022-06-04 NOTE — PROGRESS NOTES
HISTORY OF PRESENT ILLNESS  Riri Dejesus is a 29 y.o. female. The patient presents with sore throat for 5 days. Her children have had various viral symptoms and have all tested negative for COVID although one is being treated for ear infection. She has not taken anything for her symptoms. She has no other symptoms. Review of Systems   Constitutional: Negative for chills, fever and malaise/fatigue. HENT: Positive for sore throat. Negative for congestion, ear pain and sinus pain. Respiratory: Negative for cough and shortness of breath. Neurological: Negative for headaches. Physical Exam  Constitutional:       General: She is not in acute distress. Appearance: She is well-developed. She is ill-appearing. HENT:      Head: Normocephalic. Right Ear: No drainage. Tympanic membrane is not erythematous or bulging. Left Ear: No drainage. Tympanic membrane is not erythematous or bulging. Nose: Nose normal. No rhinorrhea. Mouth/Throat:      Pharynx: Posterior oropharyngeal erythema present. No oropharyngeal exudate. Cardiovascular:      Rate and Rhythm: Normal rate and regular rhythm. Heart sounds: Normal heart sounds. Pulmonary:      Effort: Pulmonary effort is normal. No respiratory distress. Breath sounds: Normal breath sounds. No decreased breath sounds or rhonchi. Lymphadenopathy:      Cervical: No cervical adenopathy. ASSESSMENT and PLAN    ICD-10-CM ICD-9-CM    1. Pharyngitis, unspecified etiology  J02.9 462 AMB POC RAPID STREP A      CULTURE, THROAT     No orders of the defined types were placed in this encounter. Results for orders placed or performed in visit on 06/04/22   AMB POC RAPID STREP A   Result Value Ref Range    VALID INTERNAL CONTROL POC Yes     Group A Strep Ag Negative Negative     The patients condition was discussed with the patient and they understand. The patient is to follow up with primary care doctor.   If signs and symptoms become worse the pt is to go to the ER. The patient is to take medications as prescribed.

## 2022-06-05 LAB
GAMMA INTERFERON BACKGROUND BLD IA-ACNC: 0.03 IU/ML
M TB IFN-G BLD-IMP: NEGATIVE
M TB IFN-G CD4+ BCKGRND COR BLD-ACNC: 0.06 IU/ML
MITOGEN IGNF BLD-ACNC: >10 IU/ML
QUANTIFERON INCUBATION, QF1T: NORMAL
QUANTIFERON TB2 AG: 0.06 IU/ML
SERVICE CMNT-IMP: NORMAL

## 2022-06-07 LAB
BACTERIA SPEC CULT: ABNORMAL
SERVICE CMNT-IMP: ABNORMAL

## 2022-06-07 RX ORDER — AZITHROMYCIN 250 MG/1
TABLET, FILM COATED ORAL
Qty: 6 TABLET | Refills: 0 | Status: SHIPPED | OUTPATIENT
Start: 2022-06-07

## 2022-06-10 ENCOUNTER — TELEPHONE (OUTPATIENT)
Dept: URGENT CARE | Age: 37
End: 2022-06-10

## 2022-06-10 RX ORDER — CEPHALEXIN 500 MG/1
500 CAPSULE ORAL 2 TIMES DAILY
Qty: 20 CAPSULE | Refills: 0 | Status: SHIPPED | OUTPATIENT
Start: 2022-06-10 | End: 2022-06-20

## 2022-06-10 NOTE — PROGRESS NOTES
Pt. Called with some concerns in reference to medication prescribed for pt. For + strep A. Provider notified, Pt. Advised to not take z-pack (possible pregnancy) pt. escribed keflex 500mg PO. Pt. Stated she has taking keflex in the pass and no adverse reactions noted.

## 2022-06-10 NOTE — TELEPHONE ENCOUNTER
Patient calls clinic requesting alternative antibiotic to z pack as she may be pregnant. Had positive throat culture for strep  Advised to not take z pack  Will call in cephalexin; states she did well on this medication in the past per nurse and is found as a prior med in chart review.

## 2025-06-30 NOTE — ANESTHESIA PROCEDURE NOTES
Allergies reviewed.  H&P note has been confirmed for the patient. Procedural consent has been signed.  Staff has reviewed the patient's labs. Epidural Block    Start time: 11/11/2020 6:35 AM  End time: 11/11/2020 6:47 AM  Performed by: Chel Maya MD  Authorized by: Chel Maya MD     Pre-Procedure  Indication: labor epidural    Preanesthetic Checklist: patient identified, risks and benefits discussed, anesthesia consent, site marked, patient being monitored, timeout performed and anesthesia consent    Timeout Time: 06:35        Epidural:   Patient position:  Left lateral decubitus  Prep region:  Lumbar  Prep: DuraPrep    Location:  L3-4    Needle and Epidural Catheter:   Needle Type:  Tuohy  Needle Gauge:  17 G  Injection Technique:  Loss of resistance using air  Attempts:  1  Catheter Size:  19 G  Catheter at Skin Depth (cm):  8  Depth in Epidural Space (cm):  4  Events: no blood with aspiration, no cerebrospinal fluid with aspiration, no paresthesia and negative aspiration test    Test Dose:  Negative    Assessment:   Catheter Secured:  Tegaderm and tape  Insertion:  Uncomplicated  Patient tolerance:  Patient tolerated the procedure well with no immediate complications